# Patient Record
Sex: MALE | Race: WHITE | NOT HISPANIC OR LATINO | Employment: UNEMPLOYED | URBAN - METROPOLITAN AREA
[De-identification: names, ages, dates, MRNs, and addresses within clinical notes are randomized per-mention and may not be internally consistent; named-entity substitution may affect disease eponyms.]

---

## 2017-09-06 ENCOUNTER — NURSE TRIAGE (OUTPATIENT)
Dept: NURSING | Facility: CLINIC | Age: 32
End: 2017-09-06

## 2017-09-06 ENCOUNTER — HOSPITAL ENCOUNTER (EMERGENCY)
Facility: CLINIC | Age: 32
Discharge: HOME | End: 2017-09-06
Attending: EMERGENCY MEDICINE | Admitting: EMERGENCY MEDICINE
Payer: MEDICAID

## 2017-09-06 VITALS
WEIGHT: 174 LBS | SYSTOLIC BLOOD PRESSURE: 128 MMHG | HEIGHT: 68 IN | RESPIRATION RATE: 16 BRPM | DIASTOLIC BLOOD PRESSURE: 92 MMHG | TEMPERATURE: 98.7 F | OXYGEN SATURATION: 100 % | HEART RATE: 93 BPM | BODY MASS INDEX: 26.37 KG/M2
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
## 2017-09-06 DIAGNOSIS — S03.2XXA: ICD-10-CM

## 2017-09-06 DIAGNOSIS — K08.89 SUBLUXATION OF TOOTH: ICD-10-CM

## 2017-09-06 DIAGNOSIS — S02.5XXA CLOSED FRACTURE OF TOOTH, INITIAL ENCOUNTER: ICD-10-CM

## 2017-09-06 PROCEDURE — 64400 NJX AA&/STRD TRIGEMINAL NRV: CPT | Performed by: EMERGENCY MEDICINE

## 2017-09-06 PROCEDURE — 64400 NJX AA&/STRD TRIGEMINAL NRV: CPT | Mod: Z6 | Performed by: EMERGENCY MEDICINE

## 2017-09-06 PROCEDURE — 99283 EMERGENCY DEPT VISIT LOW MDM: CPT | Mod: 25 | Performed by: EMERGENCY MEDICINE

## 2017-09-06 PROCEDURE — 99284 EMERGENCY DEPT VISIT MOD MDM: CPT | Mod: 25 | Performed by: EMERGENCY MEDICINE

## 2017-09-06 RX ORDER — NAPROXEN 500 MG/1
500 TABLET ORAL 2 TIMES DAILY WITH MEALS
Qty: 16 TABLET | Refills: 0 | Status: SHIPPED | OUTPATIENT
Start: 2017-09-06 | End: 2017-09-14

## 2017-09-06 RX ORDER — BUPIVACAINE HYDROCHLORIDE 5 MG/ML
INJECTION, SOLUTION EPIDURAL; INTRACAUDAL
Status: DISCONTINUED
Start: 2017-09-06 | End: 2017-09-07 | Stop reason: HOSPADM

## 2017-09-06 RX ORDER — OXYCODONE HYDROCHLORIDE 5 MG/1
5 TABLET ORAL EVERY 6 HOURS PRN
Qty: 8 TABLET | Refills: 0 | Status: SHIPPED | OUTPATIENT
Start: 2017-09-06 | End: 2021-02-16

## 2017-09-06 ASSESSMENT — ENCOUNTER SYMPTOMS
BRUISES/BLEEDS EASILY: 0
SHORTNESS OF BREATH: 0
COLOR CHANGE: 0
NECK PAIN: 0
VOMITING: 0
FACIAL SWELLING: 0
FEVER: 0
HEADACHES: 0
NAUSEA: 0
TROUBLE SWALLOWING: 0
NECK STIFFNESS: 0

## 2017-09-06 NOTE — ED AVS SNAPSHOT
" [facility], Emergency Department    [address] [facility] [address] [address]    Phone:  [phone]                                       Markus Mosley   MRN: [id number]    Department:  [facility], Emergency Department   Date of Visit:  9/6/2017           Patient Information     Date Of Birth          1985        Your diagnoses for this visit were:     Closed fracture of tooth, initial encounter     Subluxation of tooth        You were seen by Derick Moses MD.        Discharge Instructions       Please make an appointment to follow up with [facility] (phone: [phone]) as soon as possible for further evaluation and recommendations.          *DENTAL PAIN    A crack or cavity in the tooth, which exposes the sensitive inner area of the tooth can cause tooth pain. An infection in the gum or the root of the tooth can cause pain and swelling. The pain is often made worse by drinking hot or cold fluids, or biting on hard foods. Pain may spread from the tooth to the ear or jaw on the same side.  HOME CARE:  1. Avoid hot and cold foods and liquids since your tooth may be sensitive to temperature changes.  2. If your tooth is chipped or cracked, or if there is a large open cavity, apply OIL OF CLOVES (available over-the-counter in drug stores) directly to the tooth to reduce pain. Some pharmacies carry an over-the-counter \"toothache kit.\" This contains a paste, which can be applied over the exposed tooth to decrease sensitivity. This is only a temporary solution. See a dentist as soon as possible to fix the tooth.  3. A cold pack on your jaw over the sore area may help reduce pain.  4. You may use acetaminophen (Tylenol) 650-1000 mg every 6 hours or ibuprofen (Motrin, Advil) 600 mg every 6-8 hours with food to control pain, if you are able to take these medicines. [ NOTE: If you have chronic liver or kidney disease or ever had a stomach ulcer or GI bleeding, talk with your doctor before " using these medicines.]  5. If you have signs of an infection, an antibiotic will be given. Take it as directed.  FOLLOW-UP as directed with a dentist. Your pain may go away with the treatment given. However, only a dentist can fully evaluate and treat the cause and prevent the pain from coming back again.  TOOTHACHE IS A SIGN OF DISEASE IN YOUR TOOTH AND SHOULD BE EXAMINED AND TREATED BY A DENTIST.  GET PROMPT MEDICAL ATTENTION if any of the following occur:    Your face becomes swollen or red    Pain worsens or spreads to the neck    Fever over 101  F (38.3  C)    Unusual drowsiness; headache or stiff neck; weakness or fainting    Pus drains from the tooth    Difficulty swallowing or breathing    [phone] [employer], [address] [place] [address], [place] [id number]. All rights reserved. This information is not intended as a substitute for professional medical care. Always follow your healthcare professional's instructions.        Many of these clinics offer a sliding fee option for patients that qualify, and see patients on a walk-in or same day basis. Please call each clinic directly. As services, hours, fees and policies vary greatly.    [place]:  [facility]     [phone]  [facility] ([facility]) [phone]  [facility]  [phone]  [facility]      [phone]   Community Clinic    [phone]  [facility]  [phone]  [facility] [phone]  [facility]     [phone]  [facility]   [phone]  [facility] (cash only)   [phone]  [facility]    [phone] (adults)          [phone] (children)    [place]:  [facility]     [phone]; [phone]  [facility]     [phone]  [facility]     [phone]  [facility] (free, limited)    [phone]    Multiple Locations:  [facility]       [phone]                    24 Hour Appointment Hotline       To make an appointment at any [facility], call [phone] ([phone]). If you don't have a family doctor or clinic, we will help you find one. [facility] are conveniently located to serve the needs of you and your family.             Review of your medicines      START taking        Dose / Directions Last dose taken    naproxen 500 MG tablet   Commonly known as:  NAPROSYN   Dose:  500 mg   Quantity:  16 tablet        Take 1 tablet (500 mg) by mouth 2 times daily (with meals) for 8 days   Refills:  0        oxyCODONE 5 MG IR tablet   Commonly known as:  ROXICODONE   Dose:  5 mg   Quantity:  8 tablet        Take 1 tablet (5 mg) by mouth every 6 hours as needed for pain   Refills:  0          Our records show that you are taking the medicines listed below. If these are incorrect, please call your family doctor or clinic.        Dose / Directions Last dose taken    multivitamin, therapeutic with minerals Tabs tablet   Dose:  1 tablet        Take 1 tablet by mouth daily   Refills:  0                Prescriptions were sent or printed at these locations (2 Prescriptions)                   Other Prescriptions                Printed at Department/Unit printer (2 of 2)         naproxen (NAPROSYN) 500 MG tablet               oxyCODONE (ROXICODONE) 5 MG IR tablet                Orders Needing Specimen Collection     None      Pending Results     No orders found from 9/4/2017 to 9/7/2017.            Pending Culture Results     No orders found from 9/4/2017 to 9/7/2017.            Pending Results Instructions     If you had any lab results that were not finalized at the time of your Discharge, you can call the ED Lab Result RN at [phone]. You will be contacted by this team for any positive Lab results or changes in  "treatment. The nurses are available 7 days a week from 10A to 6:30P.  You can leave a message 24 hours per day and they will return your call.        Thank you for choosing [facility]       Thank you for choosing [facility] for your care. Our goal is always to provide you with excellent care. Hearing back from our patients is one way we can continue to improve our services. Please take a few minutes to complete the written survey that you may receive in the mail after you visit with us. Thank you!        [employer] Information     [employer] lets you send messages to your doctor, view your test results, renew your prescriptions, schedule appointments and more. To sign up, go to [email or web address] . Click on \"Log in\" on the left side of the screen, which will take you to the Welcome page. Then click on \"Sign up Now\" on the right side of the page.     You will be asked to enter the access code listed below, as well as some personal information. Please follow the directions to create your username and password.     Your access code is: [email or web address]  Expires: 2017 11:15 PM     Your access code will  in 90 days. If you need help or a new code, please call your [facility] clinic or [phone].        Care EveryWhere ID     This is your Care EveryWhere ID. This could be used by other organizations to access your [facility] medical records  [id number]        Equal Access to Services     ABI CRUZ : Hadii dav Siddiqi, waaxda genevieve, qaybta kaalmemo alcantar, destiney muniz . So [facility] [phone].    ATENCIÓN: Si habla español, tiene a dodd disposición servicios gratuitos de asistencia lingüística. Michael al [phone].    We comply with applicable federal civil rights laws and [place] laws. We do not discriminate on the basis of race, color, national origin, age, disability sex, sexual orientation or gender identity.            After Visit Summary       This is your record. " Keep this with you and show to your community pharmacist(s) and doctor(s) at your next visit.

## 2017-09-06 NOTE — ED AVS SNAPSHOT
[facility] [place], Emergency Department    [address] [place] [address]    Phone:  [phone]                                       Markus Mosley   MRN: [id number]    Department:  [facility], Emergency Department   Date of Visit:  9/6/2017           After Visit Summary Signature Page     I have received my discharge instructions, and my questions have been answered. I have discussed any challenges I see with this plan with the nurse or doctor.    ..........................................................................................................................................  Patient/Patient Representative Signature      ..........................................................................................................................................  Patient Representative Print Name and Relationship to Patient    ..................................................               ................................................  Date                                            Time    ..........................................................................................................................................  Reviewed by Signature/Title    ...................................................              ..............................................  Date                                                            Time

## 2017-09-07 NOTE — DISCHARGE INSTRUCTIONS
"Please make an appointment to follow up with [facility] (phone: [phone]) as soon as possible for further evaluation and recommendations.          *DENTAL PAIN    A crack or cavity in the tooth, which exposes the sensitive inner area of the tooth can cause tooth pain. An infection in the gum or the root of the tooth can cause pain and swelling. The pain is often made worse by drinking hot or cold fluids, or biting on hard foods. Pain may spread from the tooth to the ear or jaw on the same side.  HOME CARE:  1. Avoid hot and cold foods and liquids since your tooth may be sensitive to temperature changes.  2. If your tooth is chipped or cracked, or if there is a large open cavity, apply OIL OF CLOVES (available over-the-counter in drug stores) directly to the tooth to reduce pain. Some pharmacies carry an over-the-counter \"toothache kit.\" This contains a paste, which can be applied over the exposed tooth to decrease sensitivity. This is only a temporary solution. See a dentist as soon as possible to fix the tooth.  3. A cold pack on your jaw over the sore area may help reduce pain.  4. You may use acetaminophen (Tylenol) 650-1000 mg every 6 hours or ibuprofen (Motrin, Advil) 600 mg every 6-8 hours with food to control pain, if you are able to take these medicines. [ NOTE: If you have chronic liver or kidney disease or ever had a stomach ulcer or GI bleeding, talk with your doctor before using these medicines.]  5. If you have signs of an infection, an antibiotic will be given. Take it as directed.  FOLLOW-UP as directed with a dentist. Your pain may go away with the treatment given. However, only a dentist can fully evaluate and treat the cause and prevent the pain from coming back again.  TOOTHACHE IS A SIGN OF DISEASE IN YOUR TOOTH AND SHOULD BE EXAMINED AND TREATED BY A DENTIST.  GET PROMPT MEDICAL ATTENTION if any of the following occur:    Your face becomes swollen or red    Pain worsens or " spreads to the neck    Fever over 101  F (38.3  C)    Unusual drowsiness; headache or stiff neck; weakness or fainting    Pus drains from the tooth    Difficulty swallowing or breathing    [phone] [employer], [address] [place] [address], [place] [address]. All rights reserved. This information is not intended as a substitute for professional medical care. Always follow your healthcare professional's instructions.        Many of these clinics offer a sliding fee option for patients that qualify, and see patients on a walk-in or same day basis. Please call each clinic directly. As services, hours, fees and policies vary greatly.    [place]:  [facility]     [phone]  [facility] [phone]  [facility]  [phone]  [facility]      [phone]   Community Clinic    [phone]  [facility]  [phone]  [facility] [phone]  [facility]     [phone]  [facility]   [phone]  [facility] (cash only)   [phone]  [facility]    [phone] (adults)          [phone] (children)    [place]:  [facility]     [phone]; [phone]  [facility]     [phone]  [facility]     [phone]  [facility] (free, limited)    [phone]    Multiple Locations:  [facility]       [phone]

## 2017-09-07 NOTE — TELEPHONE ENCOUNTER
Reason for Disposition    [1] Tooth pushed out of its normal position AND [2] interferes with normal bite or chewing     Markus reports cracking tooth about 5 days ago and he is having uncontrolled pain.  Partner reports that when he looks at it, it looks infected or that there may be pus.    Additional Information    Negative: Knocked out (unconscious) > 1 minute    Negative: Difficult to awaken or acting confused  (e.g., disoriented, slurred speech)    Negative: Severe neck pain    Negative: Sounds like a life-threatening emergency to the triager    Negative: Wound looks infected    Negative: Tooth knocked out    Negative: Tooth is almost falling out    Negative: [1] Bleeding AND [2] won't stop after 10 minutes of direct pressure (using correct technique)    Negative: [1] Tooth pushed out if its normal position AND [2] looks severe    Protocols used: TOOTH INJURY-ADULT-      Octavia Gong RN  [facility]

## 2017-09-07 NOTE — ED PROVIDER NOTES
[facility] EMERGENCY DEPARTMENT ([facility])  9/06/17   History     Chief Complaint   Patient presents with     Dental Problem     HPI  Markus Mosley is a 31 year old male who presents to the Emergency Department for evaluation of a chipped tooth on the upper left side. Patient reports he was eating at a restaurant on 9/1/2017 when this occurred. He has not seen a dentist about the tooth. Patient has been experiencing pain in this tooth since. Pain is constant, throbbing, moderate, non-radiating. Patient has been taking ibuprofen, Tylenol and Orajel with no improvement of the pain. No fevers.    I have reviewed the Medications, Allergies, Past Medical and Surgical History, and Social History in the [employer] system.    Past Medical History:   Diagnosis Date     History of chickenpox        History reviewed. No pertinent surgical history.    Family History   Problem Relation Age of Onset     DIABETES No family hx of      Coronary Artery Disease Early Onset Father 60     CANCER No family hx of        Social History   Substance Use Topics     Smoking status: Former Smoker     Quit date: 9/8/2014     Smokeless tobacco: Current User      Comment: uses E-Cig     Alcohol use No      Comment: currently in treatment        No current facility-administered medications for this encounter.      Current Outpatient Prescriptions   Medication     naproxen (NAPROSYN) 500 MG tablet     oxyCODONE (ROXICODONE) 5 MG IR tablet     multivitamin, therapeutic with minerals (THERA-VIT-M) TABS        Allergies   Allergen Reactions     Penicillins Swelling         Review of Systems   Constitutional: Negative for fever.   HENT: Positive for dental problem. Negative for facial swelling and trouble swallowing.    Respiratory: Negative for shortness of breath.    Cardiovascular: Negative for chest pain.   Gastrointestinal: Negative for nausea and vomiting.   Musculoskeletal: Negative for neck pain and neck stiffness.   Skin: Negative for  "color change.   Allergic/Immunologic: Negative for immunocompromised state.   Neurological: Negative for headaches.   Hematological: Does not bruise/bleed easily.   Psychiatric/Behavioral: Negative for behavioral problems.   All other systems reviewed and are negative.      Physical Exam   BP: 137/87  Pulse: 110  Temp: 98.7  F (37.1  C)  Resp: 16  Height: 172.7 cm (5' 8\")  Weight: 78.9 kg (174 lb)  SpO2: 99 %  Physical Exam   Constitutional: He is oriented to person, place, and time. He appears well-developed and well-nourished. He appears distressed ( mild distress from pain).   HENT:   Head: Normocephalic.   Right Ear: External ear normal.   Left Ear: External ear normal.   Nose: Nose normal.   Mouth/Throat: No oropharyngeal exudate.   Tooth #12 is fractured and loose. No pulp exposure. No bleeding. No dental abscess. No airway obstruction.   Eyes: Conjunctivae and EOM are normal. Pupils are equal, round, and reactive to light.   Neck: Normal range of motion. Neck supple.   Cardiovascular:   tachycardia   Pulmonary/Chest: Effort normal. No respiratory distress.   Musculoskeletal: Normal range of motion.   Lymphadenopathy:     He has no cervical adenopathy.   Neurological: He is alert and oriented to person, place, and time. No cranial nerve deficit. He exhibits normal muscle tone. Coordination normal.   Skin: Skin is warm. He is not diaphoretic. No erythema.   Psychiatric: He has a normal mood and affect. His behavior is normal. Judgment and thought content normal.   Nursing note and vitals reviewed.      ED Course   10:27 PM  The patient was seen and examined by Mika Moses MD in Room ED04.     ED Course     Procedures     DENTAL BLOCK: 0.5% bupivacaine injection performed intraorally, total of 8cc's. Patient achieved full anesthesia subsequently.              Critical Care time:  none             Labs Ordered and Resulted from Time of ED Arrival Up to the Time of Departure from the ED - No data to display     "     Assessments & Plan (with Medical Decision Making)   31-year-old man presenting with toothache. Differential diagnosis: tooth fracture, avulsion, unlikely dental abscess.    After thorough history and physical exam, patient appears to be in no acute distress. We ll perform a dental block for the patient and have him referred to the dental clinic for follow-up tomorrow morning. He and his partner agree with the plan. He will return if his symptoms worsen.     This part of the medical record was transcribed by Armand Byers, Medical Scribe, from a dictation done by Mika Moses MD.       I have reviewed the nursing notes.    I have reviewed the findings, diagnosis, plan and need for follow up with the patient.    Discharge Medication List as of 9/6/2017 11:15 PM      START taking these medications    Details   naproxen (NAPROSYN) 500 MG tablet Take 1 tablet (500 mg) by mouth 2 times daily (with meals) for 8 days, Disp-16 tablet, R-0, Local Print      oxyCODONE (ROXICODONE) 5 MG IR tablet Take 1 tablet (5 mg) by mouth every 6 hours as needed for pain, Disp-8 tablet, R-0, Local Print             Final diagnoses:   Closed fracture of tooth, initial encounter   Subluxation of tooth     I, rAmand Byers, am serving as a trained medical scribe to document services personally performed by Mika Moses MD, based on the provider's statements to me.   IMika MD, was physically present and have reviewed and verified the accuracy of this note documented by Armand Byers.    9/6/2017   [facility] [place], EMERGENCY DEPARTMENT     Derick Moses MD  09/07/17 0126

## 2018-09-26 ENCOUNTER — OFFICE VISIT (OUTPATIENT)
Dept: PSYCHIATRY | Facility: CLINIC | Age: 33
End: 2018-09-26
Payer: COMMERCIAL

## 2018-09-26 DIAGNOSIS — F29 PSYCHOSIS (H): Primary | ICD-10-CM

## 2018-09-26 NOTE — MR AVS SNAPSHOT
After Visit Summary   9/26/2018    Markus Mosley    MRN: [id number]           Patient Information     Date Of Birth          1985        Visit Information        Provider Department      9/26/2018 9:00 AM Debbie Wellington LGSW [facility]        Today's Diagnoses     Psychosis (H)    -  1       Follow-ups after your visit        Who to contact     Please call your clinic at [phone] to:    Ask questions about your health    Make or cancel appointments    Discuss your medicines    Learn about your test results    Speak to your doctor            Additional Information About Your Visit        Care EveryWhere ID     This is your Care EveryWhere ID. This could be used by other organizations to access your [facility] medical records  [id number]         Blood Pressure from Last 3 Encounters:   09/06/17 (!) 128/92   02/12/16 119/81   01/08/16 135/87    Weight from Last 3 Encounters:   09/06/17 78.9 kg (174 lb)   02/12/16 83.9 kg (185 lb)   01/08/16 85.2 kg (187 lb 12.8 oz)              Today, you had the following     No orders found for display       Primary Care Provider Fax #    Physician No Ref-Primary [phone]       No address on file        Equal Access to Services     [facility] : Hadii dav Siddiqi, wadejahda genevieve, qaybta calalmada katharine, destiney muniz . So [facility] [phone].    ATENCIÓN: Si habla español, tiene a dodd disposición servicios gratuitos de asistencia lingüística. Llame al [phone].    We comply with applicable federal civil rights laws and [place] laws. We do not discriminate on the basis of race, color, national origin, age, disability, sex, sexual orientation, or gender identity.            Thank you!     Thank you for choosing [facility]  for your care. Our goal is always to provide you with excellent care. Hearing back from our patients is one way we can continue to improve our services. Please take a few minutes to  complete the written survey that you may receive in the mail after your visit with us. Thank you!             Your Updated Medication List - Protect others around you: Learn how to safely use, store and throw away your medicines at www.disposemymeds.org.          This list is accurate as of 9/26/18 11:59 PM.  Always use your most recent med list.                   Brand Name Dispense Instructions for use Diagnosis    multivitamin, therapeutic with minerals Tabs tablet      Take 1 tablet by mouth daily        oxyCODONE IR 5 MG tablet    ROXICODONE    8 tablet    Take 1 tablet (5 mg) by mouth every 6 hours as needed for pain

## 2018-10-16 NOTE — PROGRESS NOTES
"[facility] NAVIGATE Clinical Assessment of Need  A Part of the [facility] First Episode of Psychosis Program    Patient: Markus Mosley (1985, 32 year old)     MRN: [id number]  Date:  9/26/18  Clinician: JOAQUIN Caballero     Length of Actual Contact: Start Time: 9:00; End Time: 10:05  Location of Contact:  [facility], [facility]  People present:  Writer, Client, Others: Client's mother    Reviewed limits to confidentiality, Yes     Chief Complaint    \"I have a [facility]  and was referred for psychosis intervention\"    History of Presenting Illness    Markus stated he hears demonic voices and feels he needs to protect others. He experiences paranoia that electronics are/will hurt him and has broken various electronics in the past. Markus noted feeling as though there's a burglar in the house and has established various safeguards to protect him and his mother. Markus's mother said around 5 years ago, Markus was living in the woods alone because he felt he was being followed. Markus stated he has had ongoing symptoms while being sober.     Hoped for outcomes  Markus would like to have a \"stable lifestyle,\" be able to obtain a job, and diminish psychosis symptoms.     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 No  -History of a diagnosis of borderline personality disorder? No    Psychiatric hospitalization(s), location, admit date, and length of " stay  Markus reported an extensive history of psychiatric hospitalizations, about 12 in total  [facility] was the most recent, in 2018. He was hospitalized for 3 days.  He noted he was in [facility] 7-8 years ago and was on commitment following an inpatient stay of 2 weeks.     Medications, length of use, benefits, and unpleasant effects  Markus mentioned a history of the following medications, with an unspecific timeframe: Zoloft, Citalopram, Seroquel, Rexulti, Haldol, and Abilify. He estimated taking Seroquel for at least 6 months cumulatively. He took Rexulti for 1.5 months after hospitalization. He reported taking Haldol and Abilify for about one week. He has noticed an increased appetite, dry mouth, and fatigue when taking these.     -History of antipsychotic use (cumulative months)? Yes     Outpatient Programs & Services  Markus reports having a mental health  and [employer] worker from [facility].     Developmental & Medical History (Brief)    Past/Present developmental and/or medical issues, date diagnosed, and impact  He reported none.     -History of significant head injury or loss of consciousness? If yes, history of brain imaging? No  -History of a developmental delay or use of an IEP or 504? No    Psychosocial Supports:    [place] Assessment of Need Short Appraisal Schedule was completed (see below)    Primary supports  Parents, sisters, Christopher (significant other)    Strengths and coping strategies   Markus jong with the hallucinations by ignoring them, listening to music, or reminding himself they are not real.     Screening/Assessment Measures:    Modified [place] Symptom Index will be faxed to the clinic following the appointment.     [place] Assessment of Need Short Appraisal Schedule was completed today, 18, with Markus.    Scorin-No problem    1-Met need    2-Unmet need    9-Not known    1. Accommodation (what kind of place do you live in?) 0  2. Food (do  you get enough to eat?) 0  3. Looking after the home (are you able to look after your home?) 0  4. Self-care (do you have problems keeping clean and tidy?) 2  5. Daytime activities (how do you spend your day?) 2  6. Physical health (how well do you feel physically?) Rosa Aparicio feels he needs to improve nutrition and lose weight  7. Psychotic symptoms (do you ever hear voices or have problems with your thoughts?) Rosa Aparicio rated his current distress level at a 8/10  8. Information on condition and treatment (have you been given clear information about your medication?) 0  9. Psychological distress (have you recently felt sad or low?) 2  10. Safety to self (do you ever have thoughts of harming yourself?) Sandy Aparicio noted he has had suicidal thoughts in the past, but denies currently  11. Safety to others (do you think you could be a danger to other people's safety?) Sandy Aparicio noted he has had homicidal thoughts in the past, but denies currently  12. Alcohol (does drinking cause you problems?) 0  13. Drugs (do you take drugs that aren't prescribed?) Rosa Aparicio is currently using a vaporizer and finds THC to be helpful for anxiety  14. Company (are you happy with your social life?) 1  15. Intimate relationships (do you have a partner?) Rosa Aparicio feels their relationship is codependent.   16. Sexual expression (how is your sex life?) Rosa Aparicio is concerned with medication side effects in this regard  17.  (do you have any children under 18?) 0  18. Basic education (any difficulty in reading, writing, or understanding English?) 2   19. Telephone (do you know how to use a telephone?) Rosa Aparicio experiences confusion when using his cellphone  20. Transport (how do you find using the bus, tube, or train?) 0  21. Money (how do you find budgeting your money?) 0  22. Benefits (are you getting all the money you are entitled to?) 0    Mental Status Exams:  Alertness: alert  and oriented  Appearance: casually  groomed  Behavior/Demeanor: cooperative and pleasant, with good  eye contact   Speech: normal and regular rate and rhythm  Language: intact. Preferred language identified as English.  Psychomotor: normal or unremarkable  Mood: depressed  Affect: indifferent; was congruent to mood; was congruent to content  Thought Process/Associations: perseverative  Thought Content:  Reports delusions and paranoid ideation;  Denies suicidal and violent ideation  Perception:  Reports auditory hallucinations;  Denies visual hallucinations  Insight: adequate  Judgment: adequate for safety  Cognition: does  appear grossly intact; formal cognitive testing was not done    Techniques Utilized:   CBT Teaching Strategies:  Reinforcement and shaping (positive feedback for steps towards goals and gains in knowledge & skills)    Motivational Teaching Strategies:  Connect info and skills with personal goals  Promote hope and positive expectations    Educational Teaching Strategies:  Relate information to client's experience  Ask questions to check comprehension  Break down information into small chunks  Adopt client's language     Psychiatric Diagnosis(es):    According to Markus, he was diagnosed with Schizoaffective Disorder during hospitalization in July 2018.     Assessment/Progress Note:     Markus Mosley is a 32 year old single (never )  male who presented for psychosis assessment of need visit to determine potential eligibility for participation in [facility] First Episode of Psychosis services. Markus was referred by his . Markus presented today as a Fair historian with [employer] insight. He has a lifetime history of 12 hospitalizations and carries psychiatric diagnoses of Schizoaffective Disorder and Unspecified Anxiety Disorder. Further diagnostic clarification is needed. A psychiatric diagnostic assessment will be scheduled with RENAE Martin.      Markus identified present symptoms to include  "auditory hallucinations, delusional thoughts, disorganized behavior, low motivation/energy. Psychosocial stressors were identified as mental health symptoms, occupational / vocational stress and relationship stress. Explored Markus's goal(s) to include reducing symptoms, obtaining a job.     Markus is currently participating in case management and [facility] services. He may benefit from services such as therapy, medication management, family therapy, and supported employment. Markus's willingness to pursue said services seems moderate.     Identified risk factors and/or vulnerabilities include male and hopelessness, worthlessness. Protective factors and/or strengths identified as goal-focused, open to suggestions / feedback, responsible parent, support of family, friends and providers and wants to learn. Suicidal ideation was not present at the time of today's visit.    Markus agrees to treatment with the capacity to do so. Agrees to call clinic for any problems. The patient understands to call 911 or come to the nearest ED if life threatening or urgent symptoms present.    Billing for \"Interactive Complexity\"?    No    Plan/Referrals:     This writer will have scheduling call Markus to secure a Diagnostic Assessment.     Debbie Wellington CHRISTI     Attestation:    I did not see this patient directly. This patient is discussed with me in individual clinical social work supervision, and I agree with the plan as documented.     RENAE Goldberg, [facility], October 16, 2018      "

## 2018-10-25 ENCOUNTER — TELEPHONE (OUTPATIENT)
Dept: PSYCHIATRY | Facility: CLINIC | Age: 33
End: 2018-10-25

## 2018-10-25 NOTE — TELEPHONE ENCOUNTER
NAVIGATE Outreach  A Part of the [facility] First Episode of Psychosis Program     Patient Name: Markus Mosley  /Age:  1985 (32 year old)  Call duration: 5 minutes    This writer received a message from Markus's mother stating he would likely respond better with direct phone calls from our team. This writer then called Markus to set up a Diagnostic Assessment appointment. He noted he would be interested, but has limited transportation options on Wednesday afternoons. He then said he will likely be going to the hospital shortly (taken by his mother) and will need to reschedule after that. This writer provided the [facility] Scheduling phone number and asked him to reach out for for further appointments; he agreed.      RENAE Caballero, LGSW  NAVIGATE Individual Resiliency Trainer

## 2020-04-25 ENCOUNTER — NURSE TRIAGE (OUTPATIENT)
Dept: NURSING | Facility: CLINIC | Age: 35
End: 2020-04-25

## 2020-04-25 ENCOUNTER — HOSPITAL ENCOUNTER (EMERGENCY)
Facility: CLINIC | Age: 35
Discharge: HOME | End: 2020-04-26

## 2020-04-26 NOTE — TELEPHONE ENCOUNTER
Markus calls and says that he has schizoaffective disorder and has been having intrusive thoughts and hearing voices. Pt. Says that the voices tell him to harm himself. Pt. Says that his fiance is currently with him. Pt. Refuses to call 911 but will have his fiance take him to the [facility] ER now. RN then called that ER and spoke to a charge nurse. RN told that nurse about pt's symptoms and about his impending arrival. Charge nurse voice understanding. COVID 19 Nurse Triage Plan/Patient Instructions    Please be aware that novel coronavirus (COVID-19) may be circulating in the community. If you develop symptoms such as fever, cough, or SOB or if you have concerns about the presence of another infection including coronavirus (COVID-19), please contact your health care provider or visit [email or web address].     Disposition/Instructions    Patient to go to ED and follow protocol based instructions. Follow System Ambulatory Workflow for COVID 19.     Bring Your Own Device:  Please also bring your smart device(s) (smart phones, tablets, laptops) and their charging cables for your personal use and to communicate with your care team during your visit.    Thank you for limiting contact with others, wearing a simple mask to cover your cough, practice good hand hygiene habits and accessing our virtual services where possible to limit the spread of this virus.    For more information about COVID19 and options for caring for yourself at home, please visit the CDC website at https://www.cdc.gov/coronavirus/2019-ncov/about/steps-when-sick.html  For more options for care at [facility], please visit our website at [email or web address]    For more information, please use the [place] Department of Health COVID-19 Website: https://www.health.state.[place]/diseases/coronavirus/index.html  [facility] ([facility]) COVID-19 Hotlines (Interpreters available):      Health questions:  Phone Number: [phone] or 1-881.913.4658 and Hours: 7 a.m. to 7 p.m.    Schools and  questions: Phone Number: [phone] or [phone] and Hours 7 a.m. to 7 p.m.                  Reason for Disposition    Patient attempted suicide    Protocols used: SUICIDE [id number]

## 2021-02-16 ENCOUNTER — TELEPHONE (OUTPATIENT)
Dept: BEHAVIORAL HEALTH | Facility: CLINIC | Age: 36
End: 2021-02-16

## 2021-02-16 ENCOUNTER — HOSPITAL ENCOUNTER (INPATIENT)
Facility: CLINIC | Age: 36
LOS: 6 days | Discharge: PSYCHIATRIC OR SUBSTANCE USE FACILITY | End: 2021-02-22
Attending: EMERGENCY MEDICINE | Admitting: PSYCHIATRY & NEUROLOGY
Payer: COMMERCIAL

## 2021-02-16 DIAGNOSIS — Z20.822 COVID-19 RULED OUT BY LABORATORY TESTING: ICD-10-CM

## 2021-02-16 DIAGNOSIS — F13.20 BENZODIAZEPINE DEPENDENCE (H): ICD-10-CM

## 2021-02-16 LAB
ALBUMIN SERPL-MCNC: 4 G/DL (ref 3.4–5)
ALCOHOL BREATH TEST: 0 (ref 0–0.01)
ALP SERPL-CCNC: 53 U/L (ref 40–150)
ALT SERPL W P-5'-P-CCNC: 35 U/L (ref 0–70)
AMPHETAMINES UR QL SCN: NEGATIVE
ANION GAP SERPL CALCULATED.3IONS-SCNC: 6 MMOL/L (ref 3–14)
AST SERPL W P-5'-P-CCNC: 18 U/L (ref 0–45)
BARBITURATES UR QL: NEGATIVE
BASOPHILS # BLD AUTO: 0.1 10E9/L (ref 0–0.2)
BASOPHILS NFR BLD AUTO: 1.1 %
BENZODIAZ UR QL: POSITIVE
BILIRUB SERPL-MCNC: 0.3 MG/DL (ref 0.2–1.3)
BUN SERPL-MCNC: 12 MG/DL (ref 7–30)
CALCIUM SERPL-MCNC: 9.3 MG/DL (ref 8.5–10.1)
CANNABINOIDS UR QL SCN: POSITIVE
CHLORIDE SERPL-SCNC: 107 MMOL/L (ref 94–109)
CO2 SERPL-SCNC: 28 MMOL/L (ref 20–32)
COCAINE UR QL: NEGATIVE
CREAT SERPL-MCNC: 0.85 MG/DL (ref 0.66–1.25)
DIFFERENTIAL METHOD BLD: NORMAL
EOSINOPHIL # BLD AUTO: 0.3 10E9/L (ref 0–0.7)
EOSINOPHIL NFR BLD AUTO: 3.1 %
ERYTHROCYTE [DISTWIDTH] IN BLOOD BY AUTOMATED COUNT: 12.6 % (ref 10–15)
ETHANOL UR QL SCN: NEGATIVE
GFR SERPL CREATININE-BSD FRML MDRD: >90 ML/MIN/{1.73_M2}
GLUCOSE SERPL-MCNC: 97 MG/DL (ref 70–99)
HCT VFR BLD AUTO: 42.6 % (ref 40–53)
HGB BLD-MCNC: 14.2 G/DL (ref 13.3–17.7)
IMM GRANULOCYTES # BLD: 0.1 10E9/L (ref 0–0.4)
IMM GRANULOCYTES NFR BLD: 0.6 %
LABORATORY COMMENT REPORT: NORMAL
LYMPHOCYTES # BLD AUTO: 2 10E9/L (ref 0.8–5.3)
LYMPHOCYTES NFR BLD AUTO: 25.2 %
MCH RBC QN AUTO: 29.8 PG (ref 26.5–33)
MCHC RBC AUTO-ENTMCNC: 33.3 G/DL (ref 31.5–36.5)
MCV RBC AUTO: 90 FL (ref 78–100)
MONOCYTES # BLD AUTO: 0.5 10E9/L (ref 0–1.3)
MONOCYTES NFR BLD AUTO: 6.3 %
NEUTROPHILS # BLD AUTO: 5.2 10E9/L (ref 1.6–8.3)
NEUTROPHILS NFR BLD AUTO: 63.7 %
NRBC # BLD AUTO: 0 10*3/UL
NRBC BLD AUTO-RTO: 0 /100
OPIATES UR QL SCN: NEGATIVE
PLATELET # BLD AUTO: 268 10E9/L (ref 150–450)
POTASSIUM SERPL-SCNC: 4 MMOL/L (ref 3.4–5.3)
PROT SERPL-MCNC: 7.4 G/DL (ref 6.8–8.8)
RBC # BLD AUTO: 4.76 10E12/L (ref 4.4–5.9)
SARS-COV-2 RNA RESP QL NAA+PROBE: NEGATIVE
SODIUM SERPL-SCNC: 141 MMOL/L (ref 133–144)
SPECIMEN SOURCE: NORMAL
WBC # BLD AUTO: 8.1 10E9/L (ref 4–11)

## 2021-02-16 PROCEDURE — C9803 HOPD COVID-19 SPEC COLLECT: HCPCS | Performed by: EMERGENCY MEDICINE

## 2021-02-16 PROCEDURE — 99284 EMERGENCY DEPT VISIT MOD MDM: CPT | Performed by: EMERGENCY MEDICINE

## 2021-02-16 PROCEDURE — 82075 ASSAY OF BREATH ETHANOL: CPT | Performed by: EMERGENCY MEDICINE

## 2021-02-16 PROCEDURE — 85025 COMPLETE CBC W/AUTO DIFF WBC: CPT | Performed by: EMERGENCY MEDICINE

## 2021-02-16 PROCEDURE — 250N000013 HC RX MED GY IP 250 OP 250 PS 637: Performed by: EMERGENCY MEDICINE

## 2021-02-16 PROCEDURE — 128N000004 HC R&B CD ADULT

## 2021-02-16 PROCEDURE — U0003 INFECTIOUS AGENT DETECTION BY NUCLEIC ACID (DNA OR RNA); SEVERE ACUTE RESPIRATORY SYNDROME CORONAVIRUS 2 (SARS-COV-2) (CORONAVIRUS DISEASE [COVID-19]), AMPLIFIED PROBE TECHNIQUE, MAKING USE OF HIGH THROUGHPUT TECHNOLOGIES AS DESCRIBED BY CMS-2020-01-R: HCPCS | Performed by: EMERGENCY MEDICINE

## 2021-02-16 PROCEDURE — 99285 EMERGENCY DEPT VISIT HI MDM: CPT | Mod: 25 | Performed by: EMERGENCY MEDICINE

## 2021-02-16 PROCEDURE — 80053 COMPREHEN METABOLIC PANEL: CPT | Performed by: EMERGENCY MEDICINE

## 2021-02-16 PROCEDURE — 80320 DRUG SCREEN QUANTALCOHOLS: CPT | Performed by: EMERGENCY MEDICINE

## 2021-02-16 PROCEDURE — 80307 DRUG TEST PRSMV CHEM ANLYZR: CPT | Performed by: EMERGENCY MEDICINE

## 2021-02-16 PROCEDURE — 250N000013 HC RX MED GY IP 250 OP 250 PS 637: Performed by: PSYCHIATRY & NEUROLOGY

## 2021-02-16 PROCEDURE — U0005 INFEC AGEN DETEC AMPLI PROBE: HCPCS | Performed by: EMERGENCY MEDICINE

## 2021-02-16 RX ORDER — ONDANSETRON 4 MG/1
4 TABLET, ORALLY DISINTEGRATING ORAL EVERY 6 HOURS PRN
Status: DISCONTINUED | OUTPATIENT
Start: 2021-02-16 | End: 2021-02-22 | Stop reason: HOSPADM

## 2021-02-16 RX ORDER — ACETAMINOPHEN 325 MG/1
650 TABLET ORAL EVERY 4 HOURS PRN
Status: DISCONTINUED | OUTPATIENT
Start: 2021-02-16 | End: 2021-02-22 | Stop reason: HOSPADM

## 2021-02-16 RX ORDER — OLANZAPINE 10 MG/2ML
10 INJECTION, POWDER, FOR SOLUTION INTRAMUSCULAR 3 TIMES DAILY PRN
Status: DISCONTINUED | OUTPATIENT
Start: 2021-02-16 | End: 2021-02-22 | Stop reason: HOSPADM

## 2021-02-16 RX ORDER — PROPRANOLOL HYDROCHLORIDE 10 MG/1
10 TABLET ORAL 2 TIMES DAILY PRN
Status: DISCONTINUED | OUTPATIENT
Start: 2021-02-16 | End: 2021-02-22 | Stop reason: HOSPADM

## 2021-02-16 RX ORDER — QUETIAPINE FUMARATE 25 MG/1
25 TABLET, FILM COATED ORAL DAILY PRN
Status: DISCONTINUED | OUTPATIENT
Start: 2021-02-16 | End: 2021-02-20

## 2021-02-16 RX ORDER — DOXYCYCLINE HYCLATE 50 MG/1
40 CAPSULE ORAL 2 TIMES DAILY
COMMUNITY
End: 2021-02-16

## 2021-02-16 RX ORDER — VILAZODONE HYDROCHLORIDE 40 MG/1
40 TABLET ORAL DAILY
Status: DISCONTINUED | OUTPATIENT
Start: 2021-02-16 | End: 2021-02-22 | Stop reason: HOSPADM

## 2021-02-16 RX ORDER — MAGNESIUM HYDROXIDE/ALUMINUM HYDROXICE/SIMETHICONE 120; 1200; 1200 MG/30ML; MG/30ML; MG/30ML
30 SUSPENSION ORAL EVERY 4 HOURS PRN
Status: DISCONTINUED | OUTPATIENT
Start: 2021-02-16 | End: 2021-02-22 | Stop reason: HOSPADM

## 2021-02-16 RX ORDER — ARIPIPRAZOLE 20 MG/1
20 TABLET ORAL AT BEDTIME
Status: ON HOLD | COMMUNITY
End: 2021-02-19

## 2021-02-16 RX ORDER — OLANZAPINE 10 MG/1
10 TABLET ORAL 3 TIMES DAILY PRN
Status: DISCONTINUED | OUTPATIENT
Start: 2021-02-16 | End: 2021-02-22 | Stop reason: HOSPADM

## 2021-02-16 RX ORDER — PHENOBARBITAL 64.8 MG/1
64.8 TABLET ORAL 4 TIMES DAILY
Status: COMPLETED | OUTPATIENT
Start: 2021-02-16 | End: 2021-02-18

## 2021-02-16 RX ORDER — TRAZODONE HYDROCHLORIDE 50 MG/1
50 TABLET, FILM COATED ORAL AT BEDTIME
Status: ON HOLD | COMMUNITY
End: 2021-02-19

## 2021-02-16 RX ORDER — ARIPIPRAZOLE 10 MG/1
20 TABLET ORAL AT BEDTIME
Status: DISCONTINUED | OUTPATIENT
Start: 2021-02-16 | End: 2021-02-22 | Stop reason: HOSPADM

## 2021-02-16 RX ORDER — VILAZODONE HYDROCHLORIDE 40 MG/1
40 TABLET ORAL DAILY
Status: ON HOLD | COMMUNITY
End: 2021-02-19

## 2021-02-16 RX ORDER — TRAZODONE HYDROCHLORIDE 50 MG/1
50 TABLET, FILM COATED ORAL
Status: DISCONTINUED | OUTPATIENT
Start: 2021-02-16 | End: 2021-02-22 | Stop reason: HOSPADM

## 2021-02-16 RX ORDER — AMOXICILLIN 250 MG
1 CAPSULE ORAL 2 TIMES DAILY PRN
Status: DISCONTINUED | OUTPATIENT
Start: 2021-02-16 | End: 2021-02-22 | Stop reason: HOSPADM

## 2021-02-16 RX ORDER — PROPRANOLOL HYDROCHLORIDE 10 MG/1
10 TABLET ORAL 2 TIMES DAILY PRN
Status: ON HOLD | COMMUNITY
End: 2021-02-19

## 2021-02-16 RX ORDER — QUETIAPINE FUMARATE 25 MG/1
25 TABLET, FILM COATED ORAL DAILY PRN
Status: ON HOLD | COMMUNITY
End: 2021-02-19

## 2021-02-16 RX ORDER — CLONAZEPAM 1 MG/1
1 TABLET ORAL 2 TIMES DAILY
Status: ON HOLD | COMMUNITY
End: 2021-02-19

## 2021-02-16 RX ORDER — HYDROXYZINE HYDROCHLORIDE 25 MG/1
25 TABLET, FILM COATED ORAL EVERY 4 HOURS PRN
Status: DISCONTINUED | OUTPATIENT
Start: 2021-02-16 | End: 2021-02-22 | Stop reason: HOSPADM

## 2021-02-16 RX ADMIN — ARIPIPRAZOLE 20 MG: 10 TABLET ORAL at 20:40

## 2021-02-16 RX ADMIN — PHENOBARBITAL 64.8 MG: 64.8 TABLET ORAL at 20:40

## 2021-02-16 RX ADMIN — NICOTINE POLACRILEX 4 MG: 4 GUM, CHEWING BUCCAL at 14:07

## 2021-02-16 RX ADMIN — NICOTINE POLACRILEX 4 MG: 4 GUM, CHEWING BUCCAL at 18:40

## 2021-02-16 RX ADMIN — PHENOBARBITAL 64.8 MG: 64.8 TABLET ORAL at 17:02

## 2021-02-16 RX ADMIN — NICOTINE POLACRILEX 4 MG: 4 GUM, CHEWING BUCCAL at 20:40

## 2021-02-16 RX ADMIN — NICOTINE POLACRILEX 4 MG: 4 GUM, CHEWING BUCCAL at 17:02

## 2021-02-16 ASSESSMENT — ENCOUNTER SYMPTOMS
SHORTNESS OF BREATH: 0
ABDOMINAL PAIN: 0
FEVER: 0

## 2021-02-16 ASSESSMENT — ACTIVITIES OF DAILY LIVING (ADL)
LAUNDRY: WITH SUPERVISION
DRESS: INDEPENDENT
HYGIENE/GROOMING: INDEPENDENT
ORAL_HYGIENE: INDEPENDENT

## 2021-02-16 ASSESSMENT — MIFFLIN-ST. JEOR: SCORE: 2102

## 2021-02-16 NOTE — TELEPHONE ENCOUNTER
S: 12:40PM- ED MD called to request inpt detox for 35 yrs old male in the [place] ED.    B: Pt presents to the ED seeking detox from Benzodiazepines.   Pt reports that he went to [facility] this morning and they sent him to the ER.  Pt is using around 4mg Xanax daily in the last 6 months. PHONG was this morning at 8AM. He does not use Klonopin on a regular basis but he had some leftover and used it this morning at 8AM.  Pt uses marijuana daily.  Pt is not in withdrawal right now; usually takes about 8 hours. Pt has not had any alcohol in weeks. Pt states he could return to [place] after detox.    Pt has no COVID symptoms.  COVID will be ordered.      Pt denies any chronic medical illness.  He ambulates independently.  Labs are pending.   CBC: WNL  CMP: WNL  UTOX: Positive for Benzo and Cannabinoids.    COVID: pending  Breathalyzer: 0  Vitals: stable.    A: Vol.    R: 2:17PM- Dr. Hung accepts for herself on 3A.      Unit charge will call Intake back.      2:57PM- Unit will contact ED when for report and when they can accept Pt.      ED RN notified via phone.    ED was notified via text page.      Patient cleared and ready for behavioral bed placement: Yes

## 2021-02-16 NOTE — PROGRESS NOTES
10 tablet of 1 mg of klonopin wasted and observed by Roger Alvarez RN. Pt brought medication on to unit and threw in waste basket in bathroom.

## 2021-02-16 NOTE — ED PROVIDER NOTES
[facility] EMERGENCY DEPARTMENT ([facility])   2021   ED 16A 12:35 PM   History     Chief Complaint   Patient presents with     Withdrawal     Sent here by his treatment facility to detox from benzo use. Uses klonopin, last used today. Also uses xanax 2mg that he trades his klonopin for     The history is provided by the patient and medical records.     Markus Mosley is a 35 year old male who presents seeking detox for benzodiazepines.  Patient had a rule 25 completed, presented to [facility] treatment facility thinking that he would be able to detox there.  He was sent here for detox.  Patient states that he is able to return to [place] when his detox is completed here as is close and belongings are there.  He has been using 4 mg Xanax daily x 6 months.  His last use of benzodiazepines was klonopin this morning at 8:30 AM today. He isn't using klonopin routinely with the Xanax, just took an old script of it. No withdrawal symptoms yet. He does get withdrawal symptoms approximately 8-10 hours after use. No recent alcohol use, last drank 3 weeks ago. He also uses marijuana. No major medical problems.     PAST MEDICAL HISTORY:   Past Medical History:   Diagnosis Date     History of chickenpox        PAST SURGICAL HISTORY: History reviewed. No pertinent surgical history.    Past medical history, past surgical history, medications, and allergies were reviewed with the patient. Additional pertinent items: None    FAMILY HISTORY:   Family History   Problem Relation Age of Onset     Coronary Artery Disease Early Onset Father 60     Diabetes No family hx of      Cancer No family hx of        SOCIAL HISTORY:   Social History     Tobacco Use     Smoking status: Current Every Day Smoker     Types: Vaping Device     Last attempt to quit: 2014     Years since quittin.4     Smokeless tobacco: Former User     Tobacco comment: uses E-Cig   Substance Use Topics     Alcohol use: Yes     Alcohol/week:  0.0 standard drinks     Comment: socially     Social history was reviewed with the patient. Additional pertinent items: None      Patient's Medications   New Prescriptions    No medications on file   Previous Medications    ARIPIPRAZOLE (ABILIFY) 20 MG TABLET    Take 20 mg by mouth daily    CLONAZEPAM (KLONOPIN) 1 MG TABLET    Take 1 mg by mouth 2 times daily as needed for anxiety    DOXYCYCLINE HYCLATE (VIBRAMYCIN) 50 MG CAPSULE    Take 40 mg by mouth 2 times daily    MULTIVITAMIN, THERAPEUTIC WITH MINERALS (THERA-VIT-M) TABS    Take 1 tablet by mouth daily    PROPRANOLOL (INDERAL) 20 MG TABLET    Take 20 mg by mouth 2 times daily as needed    QUETIAPINE (SEROQUEL) 25 MG TABLET    Take 25 mg by mouth 2 times daily   Modified Medications    No medications on file   Discontinued Medications    OXYCODONE (ROXICODONE) 5 MG IR TABLET    Take 1 tablet (5 mg) by mouth every 6 hours as needed for pain          Allergies   Allergen Reactions     Penicillins Swelling        Review of Systems   Constitutional: Negative for fever.   Respiratory: Negative for shortness of breath.    Cardiovascular: Negative for chest pain.   Gastrointestinal: Negative for abdominal pain.   Psychiatric/Behavioral: Negative for suicidal ideas.   All other systems reviewed and are negative.    A complete review of systems was performed with pertinent positives and negatives noted in the HPI, and all other systems negative.    Physical Exam   BP: 135/84  Pulse: 77  Temp: 98.7  F (37.1  C)  Resp: 14  SpO2: 97 %      Physical Exam  Vitals signs and nursing note reviewed.   Constitutional:       General: He is not in acute distress.     Appearance: He is well-developed.   HENT:      Head: Normocephalic.   Eyes:      Extraocular Movements: Extraocular movements intact.   Neck:      Musculoskeletal: Neck supple.   Pulmonary:      Effort: No respiratory distress.   Musculoskeletal:         General: No deformity.   Skin:     General: Skin is dry.    Neurological:      Mental Status: He is alert.      Comments: Oriented   Psychiatric:         Behavior: Behavior normal.         ED Course        Procedures                Results for orders placed or performed during the hospital encounter of 02/16/21   CBC with platelets differential     Status: None   Result Value Ref Range    WBC 8.1 4.0 - 11.0 10e9/L    RBC Count 4.76 4.4 - 5.9 10e12/L    Hemoglobin 14.2 13.3 - 17.7 g/dL    Hematocrit 42.6 40.0 - 53.0 %    MCV 90 78 - 100 fl    MCH 29.8 26.5 - 33.0 pg    MCHC 33.3 31.5 - 36.5 g/dL    RDW 12.6 10.0 - 15.0 %    Platelet Count 268 150 - 450 10e9/L    Diff Method Automated Method     % Neutrophils 63.7 %    % Lymphocytes 25.2 %    % Monocytes 6.3 %    % Eosinophils 3.1 %    % Basophils 1.1 %    % Immature Granulocytes 0.6 %    Nucleated RBCs 0 0 /100    Absolute Neutrophil 5.2 1.6 - 8.3 10e9/L    Absolute Lymphocytes 2.0 0.8 - 5.3 10e9/L    Absolute Monocytes 0.5 0.0 - 1.3 10e9/L    Absolute Eosinophils 0.3 0.0 - 0.7 10e9/L    Absolute Basophils 0.1 0.0 - 0.2 10e9/L    Abs Immature Granulocytes 0.1 0 - 0.4 10e9/L    Absolute Nucleated RBC 0.0    Comprehensive metabolic panel     Status: None (In process)   Result Value Ref Range    Sodium 141 133 - 144 mmol/L    Potassium 4.0 3.4 - 5.3 mmol/L    Chloride 107 94 - 109 mmol/L    Carbon Dioxide 28 20 - 32 mmol/L    Anion Gap 6 3 - 14 mmol/L    Glucose 97 70 - 99 mg/dL    Urea Nitrogen 12 7 - 30 mg/dL    Creatinine 0.85 0.66 - 1.25 mg/dL    GFR Estimate >90 >60 mL/min/[1.73_m2]    GFR Estimate If Black >90 >60 mL/min/[1.73_m2]    Calcium 9.3 8.5 - 10.1 mg/dL    Bilirubin Total PENDING 0.2 - 1.3 mg/dL    Albumin PENDING 3.4 - 5.0 g/dL    Protein Total PENDING 6.8 - 8.8 g/dL    Alkaline Phosphatase PENDING 40 - 150 U/L    ALT PENDING 0 - 70 U/L    AST PENDING 0 - 45 U/L               Results for orders placed or performed during the hospital encounter of 02/16/21 (from the past 24 hour(s))   CBC with platelets  differential   Result Value Ref Range    WBC 8.1 4.0 - 11.0 10e9/L    RBC Count 4.76 4.4 - 5.9 10e12/L    Hemoglobin 14.2 13.3 - 17.7 g/dL    Hematocrit 42.6 40.0 - 53.0 %    MCV 90 78 - 100 fl    MCH 29.8 26.5 - 33.0 pg    MCHC 33.3 31.5 - 36.5 g/dL    RDW 12.6 10.0 - 15.0 %    Platelet Count 268 150 - 450 10e9/L    Diff Method Automated Method     % Neutrophils 63.7 %    % Lymphocytes 25.2 %    % Monocytes 6.3 %    % Eosinophils 3.1 %    % Basophils 1.1 %    % Immature Granulocytes 0.6 %    Nucleated RBCs 0 0 /100    Absolute Neutrophil 5.2 1.6 - 8.3 10e9/L    Absolute Lymphocytes 2.0 0.8 - 5.3 10e9/L    Absolute Monocytes 0.5 0.0 - 1.3 10e9/L    Absolute Eosinophils 0.3 0.0 - 0.7 10e9/L    Absolute Basophils 0.1 0.0 - 0.2 10e9/L    Abs Immature Granulocytes 0.1 0 - 0.4 10e9/L    Absolute Nucleated RBC 0.0    Comprehensive metabolic panel   Result Value Ref Range    Sodium 141 133 - 144 mmol/L    Potassium 4.0 3.4 - 5.3 mmol/L    Chloride 107 94 - 109 mmol/L    Carbon Dioxide 28 20 - 32 mmol/L    Anion Gap 6 3 - 14 mmol/L    Glucose 97 70 - 99 mg/dL    Urea Nitrogen 12 7 - 30 mg/dL    Creatinine 0.85 0.66 - 1.25 mg/dL    GFR Estimate >90 >60 mL/min/[1.73_m2]    GFR Estimate If Black >90 >60 mL/min/[1.73_m2]    Calcium 9.3 8.5 - 10.1 mg/dL    Bilirubin Total PENDING 0.2 - 1.3 mg/dL    Albumin PENDING 3.4 - 5.0 g/dL    Protein Total PENDING 6.8 - 8.8 g/dL    Alkaline Phosphatase PENDING 40 - 150 U/L    ALT PENDING 0 - 70 U/L    AST PENDING 0 - 45 U/L     Medications - No data to display          Assessments & Plan (with Medical Decision Making)   Patient presents to us with a chief complaint of benzodiazepine dependence requesting detox.  Patient is not actively suicidal or homicidal and has no significant withdrawal symptoms at this time.  Patient is medically cleared and clinical report was given to mental health intake.  We do have a bed available on the detox unit.    I have reviewed the nursing notes.    I have  reviewed the findings, diagnosis, plan and need for follow up with the patient.    New Prescriptions    No medications on file       Final diagnoses:   Benzodiazepine dependence (H)     I, Debi Guardado, am serving as a trained medical scribe to document services personally performed by Christopher Mcwilliams DO based on the provider's statements to me on February 16, 2021.  This document has been checked and approved by the attending provider.    I, Christopher Mcwilliams DO, was physically present and have reviewed and verified the accuracy of this note documented by Debi Guardado, medical scribe.     2/16/2021   [facility] EMERGENCY DEPARTMENT     Christopher Mcwilliams DO  02/16/21 [id number]

## 2021-02-16 NOTE — PHARMACY-ADMISSION MEDICATION HISTORY
Admission Medication History Completed by Pharmacy    See [facility] Admission Navigator for allergy information, preferred outpatient pharmacy, prior to admission medications and immunization status.     Medication history sources:  Discharge Summary 2/10/21 (Care Everywhere - [facility]) only    Changes made to PTA medication list (all per [facility] Discharge Summary)  Added:   - Viibryd, trazodone  Deleted: N/A  Changed:   - clonazepam-->scheduled  - aripiprazole-->20 mg HS  - propranolol-->10 mg BID PRN  - quetiapine-->25 mg daily PRN    Additional medication history information:   - Patient not interviewed as part of this medication history. Please discuss any OTC/non-prescription medication use and last doses with the patient that may not be reflected in the list below.    Prior to Admission medications    Medication Sig Last Dose Taking? Auth Provider   ARIPiprazole (ABILIFY) 20 MG tablet Take 20 mg by mouth At Bedtime  2/16/2021 Yes Reported, Patient   clonazePAM (KLONOPIN) 1 MG tablet Take 1 mg by mouth 2 times daily  2/16/2021 Yes Reported, Patient   propranolol (INDERAL) 10 MG tablet Take 10 mg by mouth 2 times daily as needed  Past Week Yes Reported, Patient   QUEtiapine (SEROQUEL) 25 MG tablet Take 25 mg by mouth daily as needed  Past Week Yes Reported, Patient   traZODone (DESYREL) 50 MG tablet Take 50 mg by mouth At Bedtime  Yes Unknown, Entered By History   vilazodone (VIIBRYD) 40 MG TABS tablet Take 40 mg by mouth daily  Yes Unknown, Entered By History     Date completed: 02/16/21    Medication history completed by:   Markus Walker, PharmD, BCPS  General acute hospital  Emergency Department: Ascom *[id number]

## 2021-02-16 NOTE — ED NOTES
ED to Behavioral Floor Handoff    SITUATION  Markus Mosley is a 35 year old male who speaks English and lives with others The patient arrived in the ED by private car from [facility] with a complaint of Withdrawal (Sent here by his treatment facility to detox from benzo use. Uses klonopin, last used today. Also uses xanax 2mg that he trades his klonopin for)  .The patient's current symptoms started/worsened 1 hour(s) ago and during this time the symptoms have increased.   In the ED, pt was diagnosed with   Final diagnoses:   Benzodiazepine dependence (H)        Initial vitals were: BP: 135/84  Pulse: 77  Temp: 98.7  F (37.1  C)  Resp: 14  SpO2: 97 %   --------  Is the patient diabetic? No   If yes, last blood glucose? --     If yes, was this treated in the ED? --  --------  Is the patient inebriated (ETOH) No or Impaired on other substances? Yes  MSSA done? No  Last MSSA score: --    Were withdrawal symptoms treated? No  Does the patient have a seizure history? No. If yes, date of most recent seizure--  --------  Is the patient patient experiencing suicidal ideation? denies current or recent suicidal ideation     Homicidal ideation? denies current or recent homicidal ideation or behaviors.    Self-injurious behavior/urges? denies current or recent self injurious behavior or ideation.  ------  Was pt aggressive in the ED No  Was a code called No  Is the pt now cooperative? Yes  -------  Meds given in ED:   Medications   nicotine polacrilex (NICORETTE) gum 4 mg (4 mg Buccal Given 2/16/21 3778)      Family present during ED course? No  Family currently present? No    BACKGROUND  Does the patient have a cognitive impairment or developmental disability? No  Allergies:   Allergies   Allergen Reactions     Penicillins Swelling   .   Social demographics are   Social History     Socioeconomic History     Marital status: Single     Spouse name: None     Number of children: None     Years of education: None     Highest  education level: None   Occupational History     Occupation: medical devices   Social Needs     Financial resource strain: None     Food insecurity     Worry: None     Inability: None     Transportation needs     Medical: None     Non-medical: None   Tobacco Use     Smoking status: Current Every Day Smoker     Types: Vaping Device     Last attempt to quit: 2014     Years since quittin.4     Smokeless tobacco: Former User     Tobacco comment: uses E-Cig   Substance and Sexual Activity     Alcohol use: Yes     Alcohol/week: 0.0 standard drinks     Comment: socially     Drug use: Yes     Types: Benzodiazepines, Marijuana     Sexual activity: Not Currently     Partners: Female   Lifestyle     Physical activity     Days per week: None     Minutes per session: None     Stress: None   Relationships     Social connections     Talks on phone: None     Gets together: None     Attends Confucianism service: None     Active member of club or organization: None     Attends meetings of clubs or organizations: None     Relationship status: None     Intimate partner violence     Fear of current or ex partner: None     Emotionally abused: None     Physically abused: None     Forced sexual activity: None   Other Topics Concern      Service No     Blood Transfusions No     Caffeine Concern Yes     Comment: 6 cups caffeinated beverage daily     Occupational Exposure Not Asked     Hobby Hazards Not Asked     Sleep Concern Not Asked     Stress Concern Not Asked     Weight Concern Not Asked     Special Diet Not Asked     Back Care Not Asked     Exercise Not Asked     Bike Helmet Not Asked     Seat Belt No     Comment: wears     Self-Exams Not Asked   Social History Narrative    Completed HS.  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         ASSESSMENT  Labs results   Labs Ordered and Resulted from Time of ED Arrival Up to the Time of Departure from the ED   DRUG ABUSE SCREEN 6 CHEM  DEP URINE ([facility]) - Abnormal; Notable for the following components:       Result Value    Benzodiazepine Qual Urine Positive (*)     Cannabinoids Qual Urine Positive (*)     All other components within normal limits   ALCOHOL BREATH TEST POCT - Normal   CBC WITH PLATELETS DIFFERENTIAL   COMPREHENSIVE METABOLIC PANEL   SARS-COV-2 (COVID-19) VIRUS RT-PCR      Imaging Studies: No results found for this or any previous visit (from the past 24 hour(s)).   Most recent vital signs /84   Pulse 77   Temp 98.7  F (37.1  C) (Oral)   Resp 14   SpO2 97%    Abnormal labs/tests/findings requiring intervention:---   Pain control: pt had none  Nausea control: pt had none    RECOMMENDATION  Are any infection precautions needed (MRSA, VRE, etc.)? No If yes, what infection? --  ---  Does the patient have mobility issues? independently. If yes, what device does the pt use? ---  ---  Is patient on 72 hour hold or commitment? No If on 72 hour hold, have hold and rights been given to patient? N/A  Are admitting orders written if after 10 p.m. ?N/A  Tasks needing to be completed:---     Amirah Smith, RN   ascom--    [phone]   [phone] [facility] ED

## 2021-02-16 NOTE — PROGRESS NOTES
02/16/21 1624   Patient Belongings   Did you bring any home meds/supplements to the hospital?  Yes   Disposition of meds  Sent to security/pharmacy per site process   Patient Belongings other (see comments);locker   Patient Belongings Put in Hospital Secure Location (Security or Locker, etc.) other (see comments)   Belongings Search Yes   Clothing Search Yes   Second Staff [employer]   Comment see my note   Storage Bin   Shoes w/laces, pants w/string, hat, 2x mask, water bottle  Medical Room Bin   Cell phone, adaptors, wallet, sunglasses, lighter  Security Envelope   Nothing   A             Admission:  I am responsible for any personal items that are not sent to the safe or pharmacy.  [facility] is not responsible for loss, theft or damage of any property in my possession.  Signature:  _________________________________ Date: _______  Time: _____                                              Staff Signature:  ____________________________ Date: ________  Time: _____      2nd Staff person, if patient is unable/unwilling to sign:    Signature: ________________________________ Date: ________  Time: _____   Discharge:  [facility] has returned all of my personal belongings:  Signature: _________________________________ Date: ________  Time: _____                                          Staff Signature:  ____________________________ Date: ________  Time: _____

## 2021-02-17 LAB
CHOLEST SERPL-MCNC: 195 MG/DL
HDLC SERPL-MCNC: 44 MG/DL
LDLC SERPL CALC-MCNC: 108 MG/DL
NONHDLC SERPL-MCNC: 151 MG/DL
TRIGL SERPL-MCNC: 216 MG/DL
TSH SERPL DL<=0.005 MIU/L-ACNC: 2.7 MU/L (ref 0.4–4)

## 2021-02-17 PROCEDURE — 99231 SBSQ HOSP IP/OBS SF/LOW 25: CPT | Performed by: PHYSICIAN ASSISTANT

## 2021-02-17 PROCEDURE — 99207 PR CONSULT E&M CHANGED TO SUBSEQUENT LEVEL: CPT | Performed by: PHYSICIAN ASSISTANT

## 2021-02-17 PROCEDURE — 84443 ASSAY THYROID STIM HORMONE: CPT | Performed by: PSYCHIATRY & NEUROLOGY

## 2021-02-17 PROCEDURE — 128N000004 HC R&B CD ADULT

## 2021-02-17 PROCEDURE — 250N000013 HC RX MED GY IP 250 OP 250 PS 637: Performed by: PSYCHIATRY & NEUROLOGY

## 2021-02-17 PROCEDURE — 36415 COLL VENOUS BLD VENIPUNCTURE: CPT | Performed by: PSYCHIATRY & NEUROLOGY

## 2021-02-17 PROCEDURE — HZ2ZZZZ DETOXIFICATION SERVICES FOR SUBSTANCE ABUSE TREATMENT: ICD-10-PCS | Performed by: PSYCHIATRY & NEUROLOGY

## 2021-02-17 PROCEDURE — 80061 LIPID PANEL: CPT | Performed by: PSYCHIATRY & NEUROLOGY

## 2021-02-17 PROCEDURE — 99223 1ST HOSP IP/OBS HIGH 75: CPT | Mod: AI | Performed by: PSYCHIATRY & NEUROLOGY

## 2021-02-17 RX ORDER — IBUPROFEN 600 MG/1
600 TABLET, FILM COATED ORAL EVERY 6 HOURS PRN
Status: DISCONTINUED | OUTPATIENT
Start: 2021-02-17 | End: 2021-02-22 | Stop reason: HOSPADM

## 2021-02-17 RX ADMIN — ARIPIPRAZOLE 20 MG: 10 TABLET ORAL at 21:26

## 2021-02-17 RX ADMIN — IBUPROFEN 600 MG: 600 TABLET ORAL at 11:02

## 2021-02-17 RX ADMIN — NICOTINE POLACRILEX 4 MG: 4 GUM, CHEWING BUCCAL at 08:59

## 2021-02-17 RX ADMIN — NICOTINE POLACRILEX 4 MG: 4 GUM, CHEWING BUCCAL at 21:26

## 2021-02-17 RX ADMIN — PHENOBARBITAL 64.8 MG: 64.8 TABLET ORAL at 08:22

## 2021-02-17 RX ADMIN — PHENOBARBITAL 64.8 MG: 64.8 TABLET ORAL at 16:22

## 2021-02-17 RX ADMIN — ACETAMINOPHEN 650 MG: 325 TABLET, FILM COATED ORAL at 08:23

## 2021-02-17 RX ADMIN — NICOTINE POLACRILEX 4 MG: 4 GUM, CHEWING BUCCAL at 12:04

## 2021-02-17 RX ADMIN — ACETAMINOPHEN 650 MG: 325 TABLET, FILM COATED ORAL at 16:22

## 2021-02-17 RX ADMIN — NICOTINE POLACRILEX 4 MG: 4 GUM, CHEWING BUCCAL at 16:22

## 2021-02-17 RX ADMIN — VILAZODONE HYDROCHLORIDE 40 MG: 40 TABLET ORAL at 08:22

## 2021-02-17 RX ADMIN — NICOTINE POLACRILEX 4 MG: 4 GUM, CHEWING BUCCAL at 10:15

## 2021-02-17 RX ADMIN — NICOTINE POLACRILEX 4 MG: 4 GUM, CHEWING BUCCAL at 11:02

## 2021-02-17 RX ADMIN — IBUPROFEN 600 MG: 600 TABLET ORAL at 21:26

## 2021-02-17 RX ADMIN — PHENOBARBITAL 64.8 MG: 64.8 TABLET ORAL at 21:25

## 2021-02-17 RX ADMIN — NICOTINE POLACRILEX 4 MG: 4 GUM, CHEWING BUCCAL at 15:12

## 2021-02-17 RX ADMIN — PHENOBARBITAL 64.8 MG: 64.8 TABLET ORAL at 12:04

## 2021-02-17 ASSESSMENT — ACTIVITIES OF DAILY LIVING (ADL)
DRESS: INDEPENDENT
HYGIENE/GROOMING: INDEPENDENT
ORAL_HYGIENE: INDEPENDENT

## 2021-02-17 NOTE — PLAN OF CARE
"  Problem: Substance Withdrawal  Goal: Substance Withdrawal  Description: Signs and symptoms of listed problems will be absent or manageable.  Outcome: No Change    MSSA scores of 2 and 4; Alert and oriented x 4, denies any significant anxiety, sweating, tremor or hallucinations; using nicotine gum and tylenol/ibuprofen prn-\"helps\" for back pain \"pulled muscle from stretching\", patient denies SI/SIB/HI and also inquires if he might be able to discharge Friday, \"I do not seem to be having withdrawal symptoms\" patient responds. Patient also requesting to order food from the outside. Visible in milieu, using phone, asks for a water pitcher, taking medications-no side effects, no fall/seizure and appears to be coping well on unit; accepting of education and direction.     02/17/21 1612   Vital Signs   Temp 97.9  F (36.6  C)   Temp src Temporal   Resp 16   Pulse 64   Pulse Rate Source Monitor   BP (!) 142/90        02/17/21 2004   Vital Signs   Temp 97.6  F (36.4  C)   Temp src Temporal   Resp 16   Pulse 80   Pulse Rate Source Monitor   /89   BP Location Right arm     "

## 2021-02-17 NOTE — PROGRESS NOTES
Met with Pt to initiate discharge planning.  Pt reports he has a bed held at [facility].  Pt signed AILYN and call placed to [facility] to coordinate door to door admission.

## 2021-02-17 NOTE — PROGRESS NOTES
Pt admitted to unit to detox from benzodiazepines, pt states that he is taking 4-6 mg of xanax daily. Pt has a prescription for klonopin that he states he trades for xanax, pt also states that he took some klonopin this morning. Pt had a prescription for 60 tablets of klonopin filled 1/28/21 and had 11 tablets left when admitted to the unit. Pt also smokes marijuana daily, denies any other substance use. Pt has never been to detox before, he has been to residential chemical dependency treatment 2 times and outpatient treatment 3 times. Pt is set up to admit to [facility] when done with detox here, he has a bed on hold. Pt states that he has schizoaffective disorder and has paranoia and auditory hallucinations at baseline. Pt denies command hallucinations but states the voices are very mean and tell him he is worthless and other negative comments. Pt denies SI.

## 2021-02-17 NOTE — PLAN OF CARE
Behavioral Team Discussion: (2/17/2021)    Continued Stay Criteria/Rationale: Patient admitted for benzodiazepine withdrawal, complicated.  Plan: The following services will be provided to the patient; psychiatric assessment, medication management, therapeutic milieu, individual and group support, and skills groups.   Participants: 3A Provider: Dr. Addison Hung MD; 3A RN's: Man Montemayor, RN; 3A CM's: Aimee King [facility], Mary Newman MA [facility] and Della Oliveira [facility]   Summary/Recommendation: Providers will assess today for treatment recommendations, discharge planning, and aftercare plans. CM will meet with pt for discharge planning.   Medical/Physical: Internal medicine consult to be completed 2/17/2021.  Precautions:   Behavioral Orders   Procedures     Code 1 - Restrict to Unit     Routine Programming     As clinically indicated     Seizure precautions     Status 15     Every 15 minutes.     Withdrawal precautions     Rationale for change in precautions or plan: N/A  Progress: No Change.

## 2021-02-17 NOTE — PROGRESS NOTES
PDMP as of 2/17/2021:     Markus Mosley   Risk Indicators   NARX SCORES  Narcotic  330  Sedative  361  Stimulant  000  Explanation and Guidance  OVERDOSE RISK SCORE     490    (Range 000-999)  Explanation and Guidance  ADDITIONAL RISK INDICATORS ( 0 )     Explanation and Guidance   This NarxCare report is based on search criteria supplied and the data entered by the dispensing pharmacy. For more information about any prescription, please contact the dispensing pharmacy or the prescriber. NarxCare scores and reports are intended to aid, not replace, medical decision making. None of the information presented should be used as sole justification for providing or refusing to provide medications. The information on this report is not warranted as accurate or complete.   Graphs   RX GRAPH  Narcotic Buprenorphine Sedative Stimulant Other     All Prescribers    Prescribers    2 - Anitha Doran    1 - Magno Coleman    Timeline  02/17  2m  6m  1y  2y    Buprenorphine mg    16    4    0  28    Timeline  02/17  2m  6m  1y  2y  Morphine MgEq (MME)    200    80    0  320    Timeline  02/17  2m  6m  1y  2y    Lorazepam MgEq (LME)    10    2    0  18    Timeline 02/17  2m  6m  1y  2y    *Per CDC guidance, the MME conversion factors prescribed or provided as part of the medication-assisted treatment for opioid use disorder should not be used to benchmark against dosage thresholds meant for opioids prescribed for pain. Buprenorphine products have no agreed upon morphine equivalency, and as partial opioid agonists, are not expected to be associated with overdose risk in the same dose-dependent manner as doses for full agonist opioids. MME = morphine milligram equivalents. LME = Lorazepam milligram equivalents. mg = dose in milligrams.     Summary     Summary  Total Prescriptions:     6   Total Prescribers:     2   Total Pharmacies:     4   Narcotics* (excluding Buprenorphine)  Current Qty:     0   Current MME/day:     0.00  30  Day Avg MME/day:     0.00   Sedatives*  Current Qty:     44   Current LME/day:    4.00  30 Day Avg LME/day:    0.93  Buprenorphine*  Current Qty:     0   Current mg/day:    0.00  30 Day Avg mg/day:    0.00  Rx Data   PRESCRIPTIONS  Total Prescriptions:  6  Total Private Pay:  0  Fill Date  ID  Written  Sold  Drug  Qty  Days  Prescriber  Rx #  Pharmacy  Refill  Daily Dose *  Pymt Type    02/10/2021   1   02/10/2021     Clonazepam 1 Mg Tablet   60.00  30  [facility]   [id number]   All ([id number])   0/0  4.00 LME  [place]  10/29/2020   2   10/29/2020   10/29/2020   Oxycodone Hcl 5 Mg Tablet   10.00  2  [facility]   [id number]   [facility] ([id number])   0/0  37.50 MME  [place]  10/19/2020   2   10/19/2020   10/19/2020   Oxycodone Hcl 5 Mg Tablet   30.00  7  [facility]   [id number]   [facility] (1802)   0/0  32.14 MME  [place]  10/10/2020   2   10/10/2020   10/10/2020   Oxycodone Hcl 5 Mg Tablet   30.00  4  [facility]   [id number]   [facility] ([id number])   0/0  56.25 MME  [place]  10/05/2020   4   10/05/2020   10/05/2020   Oxycodone Hcl 5 Mg Tablet   30.00  7  [facility]   [id number]   [facility] (1802)   0/0  32.14 MME  [place]  09/28/2020   3   09/28/2020     Oxycodone Hcl 5 Mg Tablet   30.00  4  [facility]   [id number]   [facility] ([facility])   0/0  56.25 MME  [place]  *Per CDC guidance, the MME conversion factors prescribed or provided as part of the medication-assisted treatment for opioid use disorder should not be used to benchmark against dosage thresholds meant for opioids prescribed for pain. Buprenorphine products have no agreed upon morphine equivalency, and as partial opioid agonists, are not expected to be associated with overdose risk in the same dose-dependent manner as doses for full agonist opioids. MME = morphine milligram equivalents. LME = Lorazepam milligram equivalents. mg = dose in milligrams.     Providers  Total Providers: 2   Name   Address   City   State   Zipcode   Phone   Anitha Espinoza  [address] [facility] [address]    [facility]  [address]  [phone]  Magno Coleman  [address] [address]   [facility]  [address]  [phone]  Pharmacies  Total Pharmacies: 4   Name   Address   City   State   Zipcode   Phone   [facility] ([id number])  [address]   [facility]  [address]  [phone]  [facility] (5269) [address]   [place]  [place]  [address] [phone]  [facility] ([facility])  [address]   [place]  [place]  [address] [phone]  [facility] (4441) [address]   [facility]  [address] [phone]    The report provided is based upon the search criteria entered and the corresponding data as it has been reported by dispenser(s). If erroneous information is identified or additional information is needed, please contact the dispenser or the prescriber provided on the report. Date Sold signifies the date the prescription was sold (left the pharmacy). The absence of Date Sold does not necessarily indicate the prescription was not dispensed. Fill Date represents the date the medication was filled or prepared by the pharmacy. Note, federal regulation (CFR Title 42: Part 2) requires patient consent prior to releasing certain patient data from federally funded opioid treatment programs (OTPs). As such, controlled substances dispensed from OTPs for medication-assisted treatment may not appear in the [place]  report. Morphine milligram equivalent (MME) conversion factors published by the CDC are used in the MME calculation. Per the CDC, the MME conversion factor is intended only for analytic purposes where prescription data are used to retrospectively calculate daily MME to inform analyses of risks associated with opioid prescribing. This value does not constitute clinical guidance or recommendations for converting patients from one form of opioid analgesic to another. Per the CDC, the conversion factors for drugs prescribed or provided, as part of medication-assisted  treatment for opioid use disorder should not be used to benchmark against MME dosage thresholds meant for opioids prescribed for pain. Buprenorphine products listed in the [facility] s MME file do not have an associated conversion factor. Lastly, the [facility] notes, in clinical practice, calculating MME for methadone often involves a sliding-scale approach, whereby the conversion factor increases with increasing dose. The conversion factor of 3 for methadone presented in this file could underestimate MME for a given patient. This report contains confidential information, including patient identifiers, and is not a public record. The information on this report must be treated as protected health information and is only to be disclosed to others as authorized by applicable state and Federal regulations.           Powered By       [place] Prescription Monitoring Program  [place] Board of Pharmacy  [address] [place] [address] [address]  [place] [address]  1 [phone]     [employer] 2021. All Rights Reserved. Privacy Policy

## 2021-02-17 NOTE — PLAN OF CARE
"Pt continues to be monitored for benzodiazepine withdrawal. MSSA = 6 and 2 this shift. His symptoms are improving due to phenobarbital therapy. He is pleasant and cooperative. His behavior is appropriate. He denies SI/SIB/HI. His discharge plan has yet to be determined. Probable discharge on Monday.  BP (!) 144/95   Pulse 76   Temp 97.8  F (36.6  C) (Temporal)   Resp 16   Ht 1.727 m (5' 8\")   Wt 119.7 kg (264 lb)   SpO2 100%   BMI 40.14 kg/m        "

## 2021-02-17 NOTE — CONSULTS
Consult Date:  2021      HISTORY OF PRESENT ILLNESS:  The patient is a pleasant 35-year-old gentleman with history of substance abuse, admitted to [facility] for benzodiazepine abuse and withdrawal.  An Internal Medicine consultation was ordered by Dr. Hung to assess medical problems including mild dyslipidemia on admission labs.  At this time, Markus states he feels good and denies acute physical concerns including fever, chills, chest pain, shortness of breath, abdominal pain, nausea, bowel or bladder concerns.      PAST MEDICAL HISTORY:   1.  Benzodiazepine dependence and other psychiatric history per Dr. Hung.   2.  Denies history of major medical problems including cardiopulmonary disease, hypertension and diabetes.      History reviewed. No pertinent surgical history.      ADMISSION MEDICATIONS:  Reviewed and listed in the medication reconciliation list.      ALLERGIES:  PENICILLIN.      Social History     Socioeconomic History     Marital status: Single     Spouse name: Not on file     Number of children: Not on file     Years of education: Not on file     Highest education level: Not on file   Occupational History     Occupation: medical devices   Social Needs     Financial resource strain: Not on file     Food insecurity     Worry: Not on file     Inability: Not on file     Transportation needs     Medical: Not on file     Non-medical: Not on file   Tobacco Use     Smoking status: Current Every Day Smoker     Types: Vaping Device     Last attempt to quit: 2014     Years since quittin.4     Smokeless tobacco: Former User     Tobacco comment: uses E-Cig   Substance and Sexual Activity     Alcohol use: Yes     Alcohol/week: 0.0 standard drinks     Comment: socially     Drug use: Yes     Types: Benzodiazepines, Marijuana     Sexual activity: Not Currently     Partners: Female   Lifestyle     Physical activity     Days per week: Not on file     Minutes per session: Not on file     Stress:  Not on file   Relationships     Social connections     Talks on phone: Not on file     Gets together: Not on file     Attends [employer] service: Not on file     Active member of club or organization: Not on file     Attends meetings of clubs or organizations: Not on file     Relationship status: Not on file     Intimate partner violence     Fear of current or ex partner: Not on file     Emotionally abused: Not on file     Physically abused: Not on file     Forced sexual activity: Not on file   Other Topics Concern      Service No     Blood Transfusions No     Caffeine Concern Yes     Comment: 6 cups caffeinated beverage daily     Occupational Exposure Not Asked     Hobby Hazards Not Asked     Sleep Concern Not Asked     Stress Concern Not Asked     Weight Concern Not Asked     Special Diet Not Asked     Back Care Not Asked     Exercise Not Asked     Bike Helmet Not Asked     Seat Belt No     Comment: wears     Self-Exams Not Asked   Social History Narrative    Completed HS.  Attended [employer] with degree in [place], [place].           Family History   Problem Relation Age of Onset     Coronary Artery Disease Early Onset Father 60     Diabetes No family hx of      Cancer No family hx of          REVIEW OF SYSTEMS:  Ten-point review of systems negative except as stated above in history of present illness.      PHYSICAL EXAMINATION:   GENERAL:  Nontoxic appearing man in no acute distress.   VITAL SIGNS:  Stable.  Temperature afebrile, pulse in the 70s, blood pressure 144/95.   HEENT:  Negative.   NECK:  Supple.  No cervical lymphadenopathy or thyromegaly.   LUNGS:  Clear.   CARDIOVASCULAR:  Regular rate and rhythm.   ABDOMEN:  Obese, soft, nontender.   EXTREMITIES:  No edema.   SKIN:  No rash on exposed areas.   NEUROLOGIC:  He is awake, alert and oriented x3.  Cranial nerves grossly intact.  Motor strength is symmetric.  He is not tremulous.      LABORATORY DATA:  Urine drug  screen positive for cannabinoids and benzodiazepines.  Total triglycerides 216, non-, .  Otherwise, CBC, comprehensive metabolic panel, rest of lipid panel and TSH normal.  COVID testing negative.  Breathalyzer negative.      IMPRESSION:   1.  Benzodiazepine abuse and withdrawal per Dr. Hung.   2.  Mild dyslipidemia on admission labs, likely secondary to diet.   3.  Otherwise, overall apparent normally good physical health.      PLAN:  No medical intervention indicated at this time.  He was encouraged to eat a low-fat diet and he should follow-up with his family physician after discharge in 3-6 months for repeat fasting lipid panel.  No further medical recommendations at this time.  The patient is medically stable.  Medicine is signing off.  Please feel free to call with questions.      Thank you for this consultation.         GABY CASTANEDA PA-C             D: 2021   T: 2021   MT: CECILLE      Name:     ALEYDA FERNANDEZ   MRN:      [id number]        Account:       [id number]   :      1985           Consult Date:  2021      Document: [id number]

## 2021-02-17 NOTE — H&P
Markus Mosley is a 35 year old male who was referred by priscila     History was provided by ALANNA who was a fair historian.   CHIEF COMPLAINT:      HISTORY OF PRESENT ILLNESS:    Patient is a 35-year-old  male.  He came here from treatment facility to detox from Xanax.  He was originally going there to detox from alcohol        It is important to note that 2 weeks ago had sucidie attempt this suicide attempt was after command auditory hallucinations asking him to do it he was, hospitalized for it, one week .  He stabilized on Abilify and he reports that he no longer experiences these voices  Patient has been using the following substances: Alcohol  Started at age 14, became a problem at 20  His last use was over a month ago    Patient has tolerance, withdrawal, progressive use, loss of control, spending more time and more amount than intended. Patient has made attempts to quit, is experiencing cravings, and reports negative consequences.         He has been taking Xanax not prescribed for him since age 16 x 25 became more of a problem  He has been taking Xanax 4 mg is also been taking clonazepam 2 mg.  He reports upon his recent discharge he was continued on 8  Patient has tolerance, withdrawal, progressive use, loss of control, spending more time and more amount than intended. Patient has made attempts to quit, is experiencing cravings, and reports negative consequences.  He uses marijuana                   Denies thoughts of suicide or harming others.      Denies auditory or visual hallucinations.     Patient smokes 0 cigarettes    Patient denied any gambling      He denies using any other drugs  PSYCHIATRIC REVIEW OF SYSTEMS:         Psychiatric Review of Systems:   Depression:   Denied any  depressed mood, no suicidal ideation, decreased interest, changes in sleep, changes in appetite, guilt, hopelessness, helplessness, impaired concentration, decreased energy, irritability.   Has a h/o depression  in the past though  Danette:   Denied  sleeplessness, impulsiveness, racing thoughts, increased goal-directed activities, pressured speech, increase in energy  Danette Feeling euphoric,Distractible,Impulsive,Grandiose,Talking excessively,Have energy without sleeping,Mood swings,Irritability  Denies: sleeplessness, increased goal-directed activities, abrupt increase in energy, pressured speech  Psychosis: Patient denied any auditory visual hallucinations paranoia during the interview    started at 4-5   H/o  visual hallucinations, auditory hallucinations, paranoia,  ideas of reference, thought insertions, thought broadcasting.    Anxiety: denied excessive worries that are difficult to control for the past 6 months,   Chronic anxiety , not able to stop worrying impacting sleep, poor conc, irritable , muscle tension    H/o panic attacks sob, heart racing sweaty shaky , nausea    Denies: worries that are difficult to control for the past 6 months, panic attacks  PTSD:     Denies: re-experiencing past trauma, nightmares, increased arousal, avoidance of traumatic stimuli, impaired function.  OCD:     Denies: obsessions, checking, symmetry, cleaning, skin picking.  ED:     Denies: restriction, binging, purging.                  PSYCHIATRIC HISTORY     Previous diagnoses: schizoaffective       Symptoms in relation to substance use (symptoms present without use?): yes     Past court commitments: none  SIB /SUICIDE ATTEMPTS NONE  Psych Hosp :3  Outpatient Programs  none  Inpatient cd [facility]   Out pt cd trt        SOCIAL HISTORY                                                                         Live with partner, unemployed ,no kids           Family History:   FAMILY HISTORY:   Family History   Problem Relation Age of Onset     Coronary Artery Disease Early Onset Father 60     Diabetes No family hx of      Cancer No family hx of      Family Mental Health History-  Mother /sister anxiety depression    Substance Use Problems - sister s abuse alcohol             PTA Medications:     Medications Prior to Admission   Medication Sig Dispense Refill Last Dose     ARIPiprazole (ABILIFY) 20 MG tablet Take 20 mg by mouth At Bedtime    2/16/2021     clonazePAM (KLONOPIN) 1 MG tablet Take 1 mg by mouth 2 times daily    2/16/2021     propranolol (INDERAL) 10 MG tablet Take 10 mg by mouth 2 times daily as needed    Past Week     QUEtiapine (SEROQUEL) 25 MG tablet Take 25 mg by mouth daily as needed    Past Week     traZODone (DESYREL) 50 MG tablet Take 50 mg by mouth At Bedtime        vilazodone (VIIBRYD) 40 MG TABS tablet Take 40 mg by mouth daily             Allergies:     Allergies   Allergen Reactions     Penicillins Swelling          Labs:     Recent Results (from the past 48 hour(s))   CBC with platelets differential    Collection Time: 02/16/21  1:06 PM   Result Value Ref Range    WBC 8.1 4.0 - 11.0 10e9/L    RBC Count 4.76 4.4 - 5.9 10e12/L    Hemoglobin 14.2 13.3 - 17.7 g/dL    Hematocrit 42.6 40.0 - 53.0 %    MCV 90 78 - 100 fl    MCH 29.8 26.5 - 33.0 pg    MCHC 33.3 31.5 - 36.5 g/dL    RDW 12.6 10.0 - 15.0 %    Platelet Count 268 150 - 450 10e9/L    Diff Method Automated Method     % Neutrophils 63.7 %    % Lymphocytes 25.2 %    % Monocytes 6.3 %    % Eosinophils 3.1 %    % Basophils 1.1 %    % Immature Granulocytes 0.6 %    Nucleated RBCs 0 0 /100    Absolute Neutrophil 5.2 1.6 - 8.3 10e9/L    Absolute Lymphocytes 2.0 0.8 - 5.3 10e9/L    Absolute Monocytes 0.5 0.0 - 1.3 10e9/L    Absolute Eosinophils 0.3 0.0 - 0.7 10e9/L    Absolute Basophils 0.1 0.0 - 0.2 10e9/L    Abs Immature Granulocytes 0.1 0 - 0.4 10e9/L    Absolute Nucleated RBC 0.0    Comprehensive metabolic panel    Collection Time: 02/16/21  1:06 PM   Result Value Ref Range    Sodium 141 133 - 144 mmol/L    Potassium 4.0 3.4 - 5.3 mmol/L    Chloride 107 94 - 109 mmol/L    Carbon Dioxide 28 20 - 32 mmol/L    Anion Gap 6 3 - 14 mmol/L    Glucose 97 70 -  99 mg/dL    Urea Nitrogen 12 7 - 30 mg/dL    Creatinine 0.85 0.66 - 1.25 mg/dL    GFR Estimate >90 >60 mL/min/[1.73_m2]    GFR Estimate If Black >90 >60 mL/min/[1.73_m2]    Calcium 9.3 8.5 - 10.1 mg/dL    Bilirubin Total 0.3 0.2 - 1.3 mg/dL    Albumin 4.0 3.4 - 5.0 g/dL    Protein Total 7.4 6.8 - 8.8 g/dL    Alkaline Phosphatase 53 40 - 150 U/L    ALT 35 0 - 70 U/L    AST 18 0 - 45 U/L   Asymptomatic SARS-CoV-2 COVID-19 Virus (Coronavirus) by PCR    Collection Time: 02/16/21  1:07 PM    Specimen: Nasopharyngeal   Result Value Ref Range    SARS-CoV-2 Virus Specimen Source Nasopharyngeal     SARS-CoV-2 PCR Result NEGATIVE     SARS-CoV-2 PCR Comment       Testing was performed using the [employer] Xpress SARS-CoV-2 Assay on the Cepheid Gene-Xpert   Instrument Systems. Additional information about this Emergency Use Authorization (EUA)   assay can be found via the Lab Guide.     Drug abuse screen 6 urine (tox)    Collection Time: 02/16/21  1:27 PM   Result Value Ref Range    Amphetamine Qual Urine Negative NEG^Negative    Barbiturates Qual Urine Negative NEG^Negative    Benzodiazepine Qual Urine Positive (A) NEG^Negative    Cannabinoids Qual Urine Positive (A) NEG^Negative    Cocaine Qual Urine Negative NEG^Negative    Ethanol Qual Urine Negative NEG^Negative    Opiates Qualitative Urine Negative NEG^Negative   Alcohol breath test POCT    Collection Time: 02/16/21  2:01 PM   Result Value Ref Range    Alcohol Breath Test 0.00 0.00 - 0.01   TSH with free T4 reflex and/or T3 as indicated    Collection Time: 02/17/21  7:57 AM   Result Value Ref Range    TSH 2.70 0.40 - 4.00 mU/L   Lipid panel    Collection Time: 02/17/21  7:57 AM   Result Value Ref Range    Cholesterol 195 <200 mg/dL    Triglycerides 216 (H) <150 mg/dL    HDL Cholesterol 44 >39 mg/dL    LDL Cholesterol Calculated 108 (H) <100 mg/dL    Non HDL Cholesterol 151 (H) <130 mg/dL         BP (!) 137/95   Pulse 72   Temp 97.5  F (36.4  C) (Temporal)   Resp 16   Ht  "1.727 m (5' 8\")   Wt 119.7 kg (264 lb)   SpO2 100%   BMI 40.14 kg/m    Weight is 264 lbs 0 oz  Body mass index is 40.14 kg/m .    Physical Exam:     ROS: 10 point ROS neg other than the symptoms noted above in the HPI.            Past Medical History:   PAST MEDICAL HISTORY:   Past Medical History:   Diagnosis Date     History of chickenpox        PAST SURGICAL HISTORY: History reviewed. No pertinent surgical history.    -    -           MENTAL STATUS EXAM:      Constitutional: General appearance of patient:      Appearance:  awake, alert, appeared as age stated, adequate groomed and slightly unkempt  Attitude:  cooperative  Eye Contact:  good  Mood:  content   Affect:  congruent   Speech:  clear, coherent normal rate   Psychomotor Behavior:  no evidence of tardive dyskinesia, dystonia, or tics  Thought Process:  logical, linear and goal oriented  Associations:  no loose associations  Thought Content:  no evidence of psychotic thought and active suicidal ideation present  Denied any active suicidal /homicidation ideation plan intent   Insight:  fair  Judgment:  fair  Oriented to:  time, person, and place  Attention Span and Concentration:  intact  Recent and Remote Memory:  intact  Language:  english with appropriate syntax and vocabulary  Fund of Knowledge: appropriate  Muscle Strength and Tone: normal  Gait and Station: Normal     There are no abnormal or psychotic thoughts, no preoccupations, no overvalued ideas, no rumination, no obsessions, no compulsions, no somatic concerns, no hypochrondriasis, no ideas of reference, and no delusions.  Patient denies homicidal thoughts.   Patient denies suicidal thoughts.  Patient appears to have good judgment and good insight.     Musculoskeletal: Patient shows no abnormalities of motor activity: there is no tremor, no tic, and no dystonia.  There is no apparent muscle atrophy, strength and tone appear normal, and there are no abnormal movements.  Patient has normal gait " and stance.    DISCUSSION:         Assessment:   Inpatient psychiatric hospitalization is warranted at this time for safety, stabilization, and possible adjustment in medications.    Patient has a biological predisposition with family history positive for both mental health and chemical dependency  Psychologically patient is experiencing withdrawal from Xanax he has had recent suicide attempts he has a history of schizoaffective disorder with command auditory hallucination history is also using marijuana  Patient has these particular stressors history of being carbon monoxide poisoning lack sober support relapse prone  Patient has chronic illness exacerbation leading to hospitalization progression as described.     Patient has been unable to stop using drugs in the community due to both physical and psychological symptoms.  Continued use will put the patient at risk for medical and/or psychiatric complications.       Diagnoses:       Benzodiazepine use disorder severe  Benzodiazepine withdrawal  Alcohol use disorder severe in early full remission  Schizoaffective disorder bipolar type         Plan:   Problem list  1#       Patient will detox of benzodiazepines using phenobarbital  We will continue with 60 mg 4 times a day we will monitor based on his sedation will cut down the dose he is having more withdrawal symptoms we will increase the dose    2#  /schizoaffective disorder  We will continue Abilify 20 mg Seroquel 25 mg patient signed consent we will also continue will vilazodone 40 mg  3#  4#  5#             Patient has been unable to stop using drugs in the community due to both physical and psychological symptoms.  Continued use will put the patient at risk for medical and/or psychiatric complications.    I HAVE REVIEWED LABS WITH PT AND TALKED ABOUT RESULTS WITH PT  I HAVE REVIEWED AND SUMMARIZED OLD RECORDS including his medication reconcilation of his home medications  and PDMP   I HAVE SPOKEN WITH RN ABOUT  MEDICATIONS AND DETOX SCORES  I HAVE SPOKEN WITH CM ABOUT PTS TREATMETN OPTIONS   Laboratory/Imaging:    Liver Function Studies -   Recent Labs   Lab Test 02/16/21  1306   PROTTOTAL 7.4   ALBUMIN 4.0   BILITOTAL 0.3   ALKPHOS 53   AST 18   ALT 35      Last Comprehensive Metabolic Panel:  Sodium   Date Value Ref Range Status   02/16/2021 141 133 - 144 mmol/L Final     Potassium   Date Value Ref Range Status   02/16/2021 4.0 3.4 - 5.3 mmol/L Final     Chloride   Date Value Ref Range Status   02/16/2021 107 94 - 109 mmol/L Final     Carbon Dioxide   Date Value Ref Range Status   02/16/2021 28 20 - 32 mmol/L Final     Anion Gap   Date Value Ref Range Status   02/16/2021 6 3 - 14 mmol/L Final     Glucose   Date Value Ref Range Status   02/16/2021 97 70 - 99 mg/dL Final     Urea Nitrogen   Date Value Ref Range Status   02/16/2021 12 7 - 30 mg/dL Final     Creatinine   Date Value Ref Range Status   02/16/2021 0.85 0.66 - 1.25 mg/dL Final     GFR Estimate   Date Value Ref Range Status   02/16/2021 >90 >60 mL/min/[1.73_m2] Final     Comment:     Non  GFR Calc  Starting 12/18/2018, serum creatinine based estimated GFR (eGFR) will be   calculated using the Chronic Kidney Disease Epidemiology Collaboration   (CKD-EPI) equation.       Calcium   Date Value Ref Range Status   02/16/2021 9.3 8.5 - 10.1 mg/dL Final     Bilirubin Total   Date Value Ref Range Status   02/16/2021 0.3 0.2 - 1.3 mg/dL Final     Alkaline Phosphatase   Date Value Ref Range Status   02/16/2021 53 40 - 150 U/L Final     ALT   Date Value Ref Range Status   02/16/2021 35 0 - 70 U/L Final     AST   Date Value Ref Range Status   02/16/2021 18 0 - 45 U/L Final                   Medical treatment/interventions:  Medical concerns: As above    - Consults: IM consult placed. Appreciate assistance.     Legal Status: Voluntary     Safety Assessment:   Checks: Status 15  Pt has not required locked seclusion or restraints in the past 24 hours to maintain  "safety, please refer to RN documentation for further details.    The risks, benefits, alternatives and side effects have been discussed and are understood by the patient.       Patient will be treated in therapeutic milieu with appropriate individual and group therapies as described.  Disposition: Pending clinical stabilization. Pt does  appear interested in CD treatment        \"Much or all of the text in this note was generated through the use of Dragon Dictate voice to text software. Errors in spelling or words which appear to be out of contact are unintentional, may be present due having escaped editing\"     "

## 2021-02-18 PROCEDURE — 99232 SBSQ HOSP IP/OBS MODERATE 35: CPT | Performed by: PSYCHIATRY & NEUROLOGY

## 2021-02-18 PROCEDURE — 128N000004 HC R&B CD ADULT

## 2021-02-18 PROCEDURE — 99207 PR CDG-MDM COMPONENT: MEETS MODERATE - UP CODED: CPT | Performed by: PSYCHIATRY & NEUROLOGY

## 2021-02-18 PROCEDURE — 250N000013 HC RX MED GY IP 250 OP 250 PS 637: Performed by: PSYCHIATRY & NEUROLOGY

## 2021-02-18 RX ADMIN — PHENOBARBITAL 64.8 MG: 64.8 TABLET ORAL at 09:07

## 2021-02-18 RX ADMIN — VILAZODONE HYDROCHLORIDE 40 MG: 40 TABLET ORAL at 09:07

## 2021-02-18 RX ADMIN — NICOTINE POLACRILEX 4 MG: 4 GUM, CHEWING BUCCAL at 16:10

## 2021-02-18 RX ADMIN — NICOTINE POLACRILEX 4 MG: 4 GUM, CHEWING BUCCAL at 09:06

## 2021-02-18 RX ADMIN — HYDROXYZINE HYDROCHLORIDE 25 MG: 25 TABLET, FILM COATED ORAL at 01:43

## 2021-02-18 RX ADMIN — NICOTINE POLACRILEX 4 MG: 4 GUM, CHEWING BUCCAL at 21:08

## 2021-02-18 RX ADMIN — TRAZODONE HYDROCHLORIDE 50 MG: 50 TABLET ORAL at 21:08

## 2021-02-18 RX ADMIN — PHENOBARBITAL 64.8 MG: 64.8 TABLET ORAL at 12:14

## 2021-02-18 RX ADMIN — IBUPROFEN 600 MG: 600 TABLET ORAL at 21:08

## 2021-02-18 RX ADMIN — IBUPROFEN 600 MG: 600 TABLET ORAL at 09:06

## 2021-02-18 RX ADMIN — NICOTINE POLACRILEX 4 MG: 4 GUM, CHEWING BUCCAL at 01:43

## 2021-02-18 RX ADMIN — NICOTINE POLACRILEX 4 MG: 4 GUM, CHEWING BUCCAL at 17:11

## 2021-02-18 RX ADMIN — TRAZODONE HYDROCHLORIDE 50 MG: 50 TABLET ORAL at 01:43

## 2021-02-18 RX ADMIN — NICOTINE POLACRILEX 4 MG: 4 GUM, CHEWING BUCCAL at 20:04

## 2021-02-18 RX ADMIN — PHENOBARBITAL 64.8 MG: 64.8 TABLET ORAL at 16:28

## 2021-02-18 RX ADMIN — NICOTINE POLACRILEX 4 MG: 4 GUM, CHEWING BUCCAL at 12:14

## 2021-02-18 RX ADMIN — NICOTINE POLACRILEX 4 MG: 4 GUM, CHEWING BUCCAL at 11:07

## 2021-02-18 RX ADMIN — ARIPIPRAZOLE 20 MG: 10 TABLET ORAL at 21:08

## 2021-02-18 NOTE — PROGRESS NOTES
Received a call from [facility].  Pt called them and informed them that he was discharging on Saturday.  They are requesting door to door transfer with [facility] provided transportation on Monday Feb. 22, time TBD.  RN, MD, and Pt informed.  Update:  Pt will be picked up at 1300 on Monday, Feb 22nd.  Medications should be handed off to .

## 2021-02-18 NOTE — PROGRESS NOTES
MSSA score of 3,pt required no valium for alcohol withdrawal this shift. Pt is observed to sleep throughout the noc.

## 2021-02-18 NOTE — PLAN OF CARE
Pt states he was a little upset about not being discharged until Monday but thought it through and calmed down.  Pt has a full range affect and is pleasant and cooperative with writer. Pt is med compliant.  Pt denies any SI/SIB, denies depression and anxiety.  Pt states his antidepressant works well for him and feels his depression is managed.  MSSA score was 2.  Pt on phenobarb taper.

## 2021-02-18 NOTE — PROGRESS NOTES
"[facility], [place]   Psychiatric Progress Note        Interim history   This is a 35 year old male with  Benzodiazepine use disorder severe  Benzodiazepine withdrawal  Alcohol use disorder severe in early full remission  Schizoaffective disorder bipolar typePt seen in rounds.   The patient's care was discussed with the treatment team during the daily team meeting and/or staff's chart notes were reviewed.  Staff report patient has been visible in the milieu,  no acute eventsovernight.   Patient reports his mood is good his energy is good      Sleep:No concerns, sleeps well through night  Appetite:fair motivation interest good  Denied suicidal/homicidal ideation/plan intent.denied psychosis  No prior suicde attempts  No access to gun  Pt is in alcohol withdrawal still being monitered every 4 hrs for it,   Pt mssa score are monitered  Tolerating meds and has no side effects.              Medications:     Current Facility-Administered Medications   Medication     acetaminophen (TYLENOL) tablet 650 mg     alum & mag hydroxide-simethicone (MAALOX) suspension 30 mL     ARIPiprazole (ABILIFY) tablet 20 mg     hydrOXYzine (ATARAX) tablet 25 mg     ibuprofen (ADVIL/MOTRIN) tablet 600 mg     nicotine polacrilex (NICORETTE) gum 4 mg     OLANZapine (zyPREXA) tablet 10 mg    Or     OLANZapine (zyPREXA) injection 10 mg     ondansetron (ZOFRAN-ODT) ODT tab 4 mg     PHENobarbital (LUMINAL) tablet 64.8 mg     propranolol (INDERAL) tablet 10 mg     QUEtiapine (SEROquel) tablet 25 mg     senna-docusate (SENOKOT-S/PERICOLACE) 8.6-50 MG per tablet 1 tablet     traZODone (DESYREL) tablet 50 mg     vilazodone (VIIBRYD) tablet 40 mg             Allergies:     Allergies   Allergen Reactions     Penicillins Swelling            Psychiatric Examination:   Blood pressure (!) 135/91, pulse 79, temperature 97.8  F (36.6  C), temperature source Temporal, resp. rate 16, height 1.727 m (5' 8\"), weight 119.7 kg (264 lb), " SpO2 96 %.  Weight is 264 lbs 0 oz  Body mass index is 40.14 kg/m .    Appearance:  awake, alert and adequately groomed  Attitude:  cooperative  Eye Contact:  good  Mood:  better  Affect:  appropriate and in normal range and mood congruent  Speech:  clear, coherent rate /rhythm are normal in rate rhythm volume  Psychomotor Behavior:  no evidence of tardive dyskinesia, dystonia, or tics and intact station, gait and muscle tone  Throught Process:  logical  Associations:  no loose associations  Thought Content:  no evidence of suicidal ideation or homicidal ideation, no evidence of psychotic thought, no auditory hallucinations present and no visual hallucinations present  Insight:  fair  Judgement:  intact  Oriented to:  time, person, and place  Attention Span and Concentration:  intact  Recent and Remote Memory:  intact  Language fund of knowledge are adequate         Labs:     No results found for: NTBNPI, NTBNP  Lab Results   Component Value Date    WBC 8.1 02/16/2021    HGB 14.2 02/16/2021    HCT 42.6 02/16/2021    MCV 90 02/16/2021     02/16/2021     Lab Results   Component Value Date    TSH 2.70 02/17/2021         DX    Benzodiazepine use disorder severe  Benzodiazepine withdrawal  Alcohol use disorder severe in early full remission  Schizoaffective disorder bipolar type   PLAN  Continue phenobarbital 60 mg 4 times a day patient tolerating with no side effects today's date to start the taper on day 3    MSSA    Eating Disturbances: ate and enjoyed all of it or not applicable  Tremor: 0 - no tremor  Sleep Disturbance: slept through the night or not applicable  Clouding of Sensorium: no evidence  Hallucinations: 0 - none  Quality of Contact: 0 - awareness of examiner and people around him/her  Agitation: 0 - normal activity  Paroxysmal Sweats: 0 - no observed sweating  Temperature: 99.5 or below  Pulse: 1 - 70 to 79  Total MSSA Score: 2    Laboratory/Imaging: reviewed with patient   Consults: internal  medicine consult reviewed  Patient will be treated in therapeutic milieu with appropriate individual and group therapies as described.  PDMP CHECKED     Supportive psychotheraoy provided, shakila talked about recovery enviroment, relapse prevention, triggers to use.  Discussed with patient many issues of addiction,triggers, relapse, and establishing a solid recovery program.  Asked pt to be med complinat   Medical diagnoses to be addressed this admission:    Plan:  LABORATORY DATA:  Urine drug screen positive for cannabinoids and benzodiazepines.  Total triglycerides 216, non-, .  Otherwise, CBC, comprehensive metabolic panel, rest of lipid panel and TSH normal.  COVID testing negative.  Breathalyzer negative.      IMPRESSION:   1.  Benzodiazepine abuse and withdrawal per Dr. Hung.   2.  Mild dyslipidemia on admission labs, likely secondary to diet.   3.  Otherwise, overall apparent normally good physical health.      PLAN:  No medical intervention indicated at this time.  He was encouraged to eat a low-fat diet and he should follow-up with his family physician after discharge in 3-6 months for repeat fasting lipid panel.  No further medical recommendations at this time.  The patient is medically stable.  Medicine is signing off.  Please feel free to call with questions.      Thank you for this consultation.     Legal Status: voluntary    Safety Assessment:   Checks:  15 min  Precautions: withdrawal precautions  Pt has not required locked seclusion or restraints in the past 24 hours to maintain safety, please refer to RN documentation for further details.  Discussed with patient many issues of addiction,triggers, relapse, and establishing a solid recovery program.  Able to give informed consent:  YES   Discussed Risks/Benefits/Side Effects/Alternatives: YES    After discussion of the indications, risks, benefits, alternatives and consequences of no treatment, the patient elects to complete detox and go  to treatment at [facility]

## 2021-02-19 PROCEDURE — 128N000004 HC R&B CD ADULT

## 2021-02-19 PROCEDURE — 250N000013 HC RX MED GY IP 250 OP 250 PS 637: Performed by: PSYCHIATRY & NEUROLOGY

## 2021-02-19 PROCEDURE — 99233 SBSQ HOSP IP/OBS HIGH 50: CPT | Performed by: PSYCHIATRY & NEUROLOGY

## 2021-02-19 RX ORDER — ARIPIPRAZOLE 20 MG/1
20 TABLET ORAL AT BEDTIME
Qty: 30 TABLET | Refills: 0 | Status: SHIPPED | OUTPATIENT
Start: 2021-02-19 | End: 2022-01-07

## 2021-02-19 RX ORDER — PHENOBARBITAL 32.4 MG/1
32.4 TABLET ORAL EVERY MORNING
Status: COMPLETED | OUTPATIENT
Start: 2021-02-21 | End: 2021-02-21

## 2021-02-19 RX ORDER — PHENOBARBITAL 64.8 MG/1
64.8 TABLET ORAL EVERY MORNING
Status: COMPLETED | OUTPATIENT
Start: 2021-02-20 | End: 2021-02-20

## 2021-02-19 RX ORDER — PHENOBARBITAL 32.4 MG/1
32.4 TABLET ORAL AT BEDTIME
Qty: 6 TABLET | Refills: 0 | Status: SHIPPED | OUTPATIENT
Start: 2021-02-22 | End: 2021-02-19

## 2021-02-19 RX ORDER — PROPRANOLOL HYDROCHLORIDE 10 MG/1
10 TABLET ORAL 2 TIMES DAILY PRN
Qty: 30 TABLET | Refills: 0 | Status: SHIPPED | OUTPATIENT
Start: 2021-02-19 | End: 2022-01-07

## 2021-02-19 RX ORDER — PHENOBARBITAL 64.8 MG/1
64.8 TABLET ORAL 2 TIMES DAILY
Status: COMPLETED | OUTPATIENT
Start: 2021-02-19 | End: 2021-02-19

## 2021-02-19 RX ORDER — VILAZODONE HYDROCHLORIDE 40 MG/1
40 TABLET ORAL DAILY
Qty: 30 TABLET | Refills: 0 | Status: SHIPPED | OUTPATIENT
Start: 2021-02-19 | End: 2022-04-17

## 2021-02-19 RX ORDER — PHENOBARBITAL 64.8 MG/1
64.8 TABLET ORAL ONCE
Status: COMPLETED | OUTPATIENT
Start: 2021-02-19 | End: 2021-02-19

## 2021-02-19 RX ORDER — POLYETHYLENE GLYCOL 3350 17 G
2 POWDER IN PACKET (EA) ORAL
Status: DISCONTINUED | OUTPATIENT
Start: 2021-02-19 | End: 2021-02-19

## 2021-02-19 RX ORDER — QUETIAPINE FUMARATE 25 MG/1
25 TABLET, FILM COATED ORAL DAILY PRN
Qty: 30 TABLET | Refills: 0 | Status: SHIPPED | OUTPATIENT
Start: 2021-02-19 | End: 2021-02-22

## 2021-02-19 RX ORDER — PHENOBARBITAL 32.4 MG/1
32.4 TABLET ORAL AT BEDTIME
Qty: 6 TABLET | Refills: 0 | Status: SHIPPED | OUTPATIENT
Start: 2021-02-22 | End: 2022-04-18

## 2021-02-19 RX ORDER — HYDROXYZINE HYDROCHLORIDE 25 MG/1
25 TABLET, FILM COATED ORAL EVERY 4 HOURS PRN
Qty: 30 TABLET | Refills: 0 | Status: SHIPPED | OUTPATIENT
Start: 2021-02-19 | End: 2022-04-17

## 2021-02-19 RX ORDER — TRAZODONE HYDROCHLORIDE 50 MG/1
50 TABLET, FILM COATED ORAL AT BEDTIME
Qty: 30 TABLET | Refills: 0 | Status: SHIPPED | OUTPATIENT
Start: 2021-02-19 | End: 2022-01-07

## 2021-02-19 RX ORDER — POLYETHYLENE GLYCOL 3350 17 G
4 POWDER IN PACKET (EA) ORAL
Status: DISCONTINUED | OUTPATIENT
Start: 2021-02-19 | End: 2021-02-22 | Stop reason: HOSPADM

## 2021-02-19 RX ORDER — ARIPIPRAZOLE 2 MG/1
2 TABLET ORAL 2 TIMES DAILY PRN
Qty: 30 TABLET | Refills: 1 | Status: SHIPPED | OUTPATIENT
Start: 2021-02-19 | End: 2022-01-07

## 2021-02-19 RX ADMIN — NICOTINE POLACRILEX 4 MG: 2 LOZENGE ORAL at 20:50

## 2021-02-19 RX ADMIN — IBUPROFEN 600 MG: 600 TABLET ORAL at 08:02

## 2021-02-19 RX ADMIN — NICOTINE POLACRILEX 4 MG: 2 LOZENGE ORAL at 15:01

## 2021-02-19 RX ADMIN — PHENOBARBITAL 64.8 MG: 64.8 TABLET ORAL at 15:00

## 2021-02-19 RX ADMIN — NICOTINE POLACRILEX 4 MG: 2 LOZENGE ORAL at 16:10

## 2021-02-19 RX ADMIN — ARIPIPRAZOLE 20 MG: 10 TABLET ORAL at 20:48

## 2021-02-19 RX ADMIN — PHENOBARBITAL 64.8 MG: 64.8 TABLET ORAL at 08:43

## 2021-02-19 RX ADMIN — NICOTINE POLACRILEX 4 MG: 4 GUM, CHEWING BUCCAL at 08:02

## 2021-02-19 RX ADMIN — NICOTINE POLACRILEX 4 MG: 2 LOZENGE ORAL at 10:52

## 2021-02-19 RX ADMIN — VILAZODONE HYDROCHLORIDE 40 MG: 40 TABLET ORAL at 08:02

## 2021-02-19 RX ADMIN — PHENOBARBITAL 64.8 MG: 64.8 TABLET ORAL at 20:48

## 2021-02-19 RX ADMIN — NICOTINE POLACRILEX 4 MG: 2 LOZENGE ORAL at 18:38

## 2021-02-19 RX ADMIN — TRAZODONE HYDROCHLORIDE 50 MG: 50 TABLET ORAL at 20:48

## 2021-02-19 RX ADMIN — ACETAMINOPHEN 650 MG: 325 TABLET, FILM COATED ORAL at 10:52

## 2021-02-19 ASSESSMENT — ACTIVITIES OF DAILY LIVING (ADL)
ORAL_HYGIENE: INDEPENDENT
DRESS: INDEPENDENT
LAUNDRY: WITH SUPERVISION
DRESS: STREET CLOTHES
HYGIENE/GROOMING: INDEPENDENT
HYGIENE/GROOMING: INDEPENDENT
ORAL_HYGIENE: INDEPENDENT

## 2021-02-19 NOTE — PROGRESS NOTES
[facility], [place]   Psychiatric Progress Note        Interim history   This is a 35 year old male with  Benzodiazepine use disorder severe  Benzodiazepine withdrawal  Alcohol use disorder severe in early full remission  Schizoaffective disorder bipolar typePt seen in rounds.   The patient's care was discussed with the treatment team during the daily team meeting and/or staff's chart notes were reviewed.  Staff report patient has been visible in the milieu,  no acute eventsovernight.   Patient reports his mood is good his energy is good      Sleep:No concerns, sleeps well through night  Appetite:fair motivation interest good  Denied suicidal/homicidal ideation/plan intent.denied psychosis  No prior suicde attempts  No access to gun  Pt is in alcohol withdrawal still being monitered every 4 hrs for it,   Pt mssa score are monitered  Tolerating meds and has no side effects.              Medications:     Current Facility-Administered Medications   Medication     acetaminophen (TYLENOL) tablet 650 mg     alum & mag hydroxide-simethicone (MAALOX) suspension 30 mL     ARIPiprazole (ABILIFY) tablet 20 mg     hydrOXYzine (ATARAX) tablet 25 mg     ibuprofen (ADVIL/MOTRIN) tablet 600 mg     nicotine (COMMIT) lozenge 2 mg     nicotine polacrilex (NICORETTE) gum 4 mg     OLANZapine (zyPREXA) tablet 10 mg    Or     OLANZapine (zyPREXA) injection 10 mg     ondansetron (ZOFRAN-ODT) ODT tab 4 mg     [START ON 2/21/2021] PHENobarbital (LUMINAL) tablet 32.4 mg     PHENobarbital (LUMINAL) tablet 64.8 mg     [START ON 2/20/2021] PHENobarbital (LUMINAL) tablet 64.8 mg     [START ON 2/20/2021] PHENobarbital (LUMINAL) tablet 97.2 mg     propranolol (INDERAL) tablet 10 mg     QUEtiapine (SEROquel) tablet 25 mg     senna-docusate (SENOKOT-S/PERICOLACE) 8.6-50 MG per tablet 1 tablet     traZODone (DESYREL) tablet 50 mg     vilazodone (VIIBRYD) tablet 40 mg             Allergies:     Allergies   Allergen  "Reactions     Penicillins Swelling            Psychiatric Examination:   Blood pressure (!) 136/90, pulse 85, temperature 97.6  F (36.4  C), temperature source Temporal, resp. rate 16, height 1.727 m (5' 8\"), weight 119.7 kg (264 lb), SpO2 97 %.  Weight is 264 lbs 0 oz  Body mass index is 40.14 kg/m .    Appearance:  awake, alert and adequately groomed  Attitude:  cooperative  Eye Contact:  good  Mood:  better  Affect:  appropriate and in normal range and mood congruent  Speech:  clear, coherent rate /rhythm are normal in rate rhythm volume  Psychomotor Behavior:  no evidence of tardive dyskinesia, dystonia, or tics and intact station, gait and muscle tone  Throught Process:  logical  Associations:  no loose associations  Thought Content:  no evidence of suicidal ideation or homicidal ideation, no evidence of psychotic thought, no auditory hallucinations present and no visual hallucinations present  Insight:  fair  Judgement:  intact  Oriented to:  time, person, and place  Attention Span and Concentration:  intact  Recent and Remote Memory:  intact  Language fund of knowledge are adequate         Labs:     No results found for: NTBNPI, NTBNP  Lab Results   Component Value Date    WBC 8.1 02/16/2021    HGB 14.2 02/16/2021    HCT 42.6 02/16/2021    MCV 90 02/16/2021     02/16/2021     Lab Results   Component Value Date    TSH 2.70 02/17/2021         DX    Benzodiazepine use disorder severe  Benzodiazepine withdrawal  Alcohol use disorder severe in early full remission  Schizoaffective disorder bipolar type   PLAN  Will start taper today    Will write taper for weekend     We will order Abilify 2 mg twice a day as needed for psychotic anxiety  Patient had recent increased anxiety and command auditory hallucinations and overdosed on medications  MSSA    Eating Disturbances: 2  Tremor: 0 - no tremor  Sleep Disturbance: slept through the night or not applicable  Clouding of Sensorium: no evidence  Hallucinations: 0 " - none  Quality of Contact: 0 - awareness of examiner and people around him/her  Agitation: 0 - normal activity  Paroxysmal Sweats: 0 - no observed sweating  Temperature: 99.5 or below  Pulse: 2 - 80 to 89  Total MSSA Score: 5  Schizoaffective disorder bipolar type  Will continue abilfy     Laboratory/Imaging: reviewed with patient   Consults: internal medicine consult reviewed  Patient will be treated in therapeutic milieu with appropriate individual and group therapies as described.  PDMP CHECKED     Supportive psychotheraoy provided, shakila talked about recovery enviroment, relapse prevention, triggers to use.  Discussed with patient many issues of addiction,triggers, relapse, and establishing a solid recovery program.  Asked pt to be med complinat   Medical diagnoses to be addressed this admission:    Plan:  LABORATORY DATA:  Urine drug screen positive for cannabinoids and benzodiazepines.  Total triglycerides 216, non-, .  Otherwise, CBC, comprehensive metabolic panel, rest of lipid panel and TSH normal.  COVID testing negative.  Breathalyzer negative.      IMPRESSION:   1.  Benzodiazepine abuse and withdrawal per Dr. Hung.   2.  Mild dyslipidemia on admission labs, likely secondary to diet.   3.  Otherwise, overall apparent normally good physical health.      PLAN:  No medical intervention indicated at this time.  He was encouraged to eat a low-fat diet and he should follow-up with his family physician after discharge in 3-6 months for repeat fasting lipid panel.  No further medical recommendations at this time.  The patient is medically stable.  Medicine is signing off.  Please feel free to call with questions.      Thank you for this consultation.     Legal Status: voluntary    Safety Assessment:   Checks:  15 min  Precautions: withdrawal precautions  Pt has not required locked seclusion or restraints in the past 24 hours to maintain safety, please refer to RN documentation for further  details.  Discussed with patient many issues of addiction,triggers, relapse, and establishing a solid recovery program.  Able to give informed consent:  YES   Discussed Risks/Benefits/Side Effects/Alternatives: YES    After discussion of the indications, risks, benefits, alternatives and consequences of no treatment, the patient elects to complete detox and go to treatment at [facility]

## 2021-02-19 NOTE — PLAN OF CARE
Pt MSSA scores today are 5 and 5, 64.8mg phenobarbital administered x 2. Pt denies suicidal ideation plans or intent. Denies anxiety and depression. Endorses feeling happiness and hopefulness. Will continue to monitor and intervene as warranted.

## 2021-02-19 NOTE — PLAN OF CARE
"Pt assessed once on MSSA for benzo use, score of 3. Pt received scheduled phenobarb.    Pt denied SI. Stated he has been on the phone talking to friends and family for most of the shift. Stated he was feeling \"not bad\"    No other concerns noted or stated this shift  "

## 2021-02-19 NOTE — PROVIDER NOTIFICATION
Update Dr. Hung that pt no longer has any active phenobarbital orders. Obtained 1 time dose order for phenobarbital.

## 2021-02-20 PROCEDURE — 128N000004 HC R&B CD ADULT

## 2021-02-20 PROCEDURE — 250N000013 HC RX MED GY IP 250 OP 250 PS 637: Performed by: PSYCHIATRY & NEUROLOGY

## 2021-02-20 PROCEDURE — H2035 A/D TX PROGRAM, PER HOUR: HCPCS | Mod: HQ

## 2021-02-20 RX ORDER — QUETIAPINE FUMARATE 25 MG/1
25 TABLET, FILM COATED ORAL DAILY PRN
Status: DISCONTINUED | OUTPATIENT
Start: 2021-02-20 | End: 2021-02-22 | Stop reason: HOSPADM

## 2021-02-20 RX ADMIN — NICOTINE POLACRILEX 4 MG: 2 LOZENGE ORAL at 09:55

## 2021-02-20 RX ADMIN — NICOTINE POLACRILEX 4 MG: 2 LOZENGE ORAL at 16:21

## 2021-02-20 RX ADMIN — Medication 2 MG: at 14:12

## 2021-02-20 RX ADMIN — TRAZODONE HYDROCHLORIDE 50 MG: 50 TABLET ORAL at 22:03

## 2021-02-20 RX ADMIN — NICOTINE POLACRILEX 4 MG: 2 LOZENGE ORAL at 07:54

## 2021-02-20 RX ADMIN — ARIPIPRAZOLE 20 MG: 10 TABLET ORAL at 20:05

## 2021-02-20 RX ADMIN — NICOTINE POLACRILEX 4 MG: 2 LOZENGE ORAL at 14:09

## 2021-02-20 RX ADMIN — PHENOBARBITAL 64.8 MG: 64.8 TABLET ORAL at 07:53

## 2021-02-20 RX ADMIN — PROPRANOLOL HYDROCHLORIDE 10 MG: 10 TABLET ORAL at 16:22

## 2021-02-20 RX ADMIN — ACETAMINOPHEN 650 MG: 325 TABLET, FILM COATED ORAL at 06:13

## 2021-02-20 RX ADMIN — PHENOBARBITAL 97.2 MG: 64.8 TABLET ORAL at 20:06

## 2021-02-20 RX ADMIN — NICOTINE POLACRILEX 4 MG: 2 LOZENGE ORAL at 20:06

## 2021-02-20 RX ADMIN — VILAZODONE HYDROCHLORIDE 40 MG: 40 TABLET ORAL at 07:54

## 2021-02-20 RX ADMIN — PROPRANOLOL HYDROCHLORIDE 10 MG: 10 TABLET ORAL at 09:55

## 2021-02-20 ASSESSMENT — ACTIVITIES OF DAILY LIVING (ADL)
LAUNDRY: WITH SUPERVISION
HYGIENE/GROOMING: INDEPENDENT
DRESS: STREET CLOTHES
ORAL_HYGIENE: INDEPENDENT
ORAL_HYGIENE: INDEPENDENT
DRESS: STREET CLOTHES
HYGIENE/GROOMING: INDEPENDENT

## 2021-02-20 NOTE — PLAN OF CARE
Pt was out in the lounge interacting with peer and staff. He denied any mental health symptoms. Pt MSSA score of 6 and 6 given scheduled Phenobarbital. Pt showered this evening. He told this nurse that he is serious about staying sober and doesn't want to continue to hurt himself. Coping skills were discussed and he appeared calmer. Pt denied any AH/VH during shift.  Will continue to monitor.

## 2021-02-20 NOTE — PROGRESS NOTES
Pt has discharge medications that are too soon to fill however he has brought all of them to the hospital with exception to trazodone. He reports no way to get the trazodone therefore an insurance override was attempted through discharge pharmacy. The discharge pharmacy attempted to complete this but it was denied. Discharge Pharmacy states they can call his plan  Monday morning to hopefully obtain an override to fill the trazodone. Recommend day RN call the discharge pharmacy Monday at 0800 to get this underway as patient is slate for pickup from [facility] for treatment Monday at 1300.

## 2021-02-20 NOTE — PROGRESS NOTES
Pt requests and recieves 650 of Tylenol for a headache@0615, otherwise Pt is observed to sleep throughout the noc.

## 2021-02-20 NOTE — PLAN OF CARE
"Pt denies depression. Denies suicidal ideation plans or intent. Endorses \"some\" anxiety but relates it to \"I'm anxious to get out\". Pt understands he'll be going to [facility] on Monday. He asked question about the phenobarbital taper and I answered all his questions \"so I'll be going to [facility] on a taper\". Informed pt he will be still tapering for a few days at [facility] and then it will end. Informed him the taper is set in a way so that he should not feel symptoms of withdrawal coming off phenobarbital. Pt states understanding of this information. MSSA score today is a 5, phenobarbital administered as ordered. Will continue to monitor and intervene as warranted.  "

## 2021-02-21 PROCEDURE — H2032 ACTIVITY THERAPY, PER 15 MIN: HCPCS

## 2021-02-21 PROCEDURE — 250N000013 HC RX MED GY IP 250 OP 250 PS 637: Performed by: PHYSICIAN ASSISTANT

## 2021-02-21 PROCEDURE — 128N000004 HC R&B CD ADULT

## 2021-02-21 PROCEDURE — 250N000013 HC RX MED GY IP 250 OP 250 PS 637: Performed by: PSYCHIATRY & NEUROLOGY

## 2021-02-21 RX ORDER — CLONIDINE HYDROCHLORIDE 0.1 MG/1
0.1 TABLET ORAL 2 TIMES DAILY PRN
Status: DISCONTINUED | OUTPATIENT
Start: 2021-02-21 | End: 2021-02-22 | Stop reason: HOSPADM

## 2021-02-21 RX ORDER — QUETIAPINE FUMARATE 25 MG/1
25 TABLET, FILM COATED ORAL DAILY PRN
Qty: 30 TABLET | Refills: 0 | Status: SHIPPED | OUTPATIENT
Start: 2021-02-21 | End: 2022-01-07

## 2021-02-21 RX ADMIN — PHENOBARBITAL 32.4 MG: 32.4 TABLET ORAL at 08:07

## 2021-02-21 RX ADMIN — VILAZODONE HYDROCHLORIDE 40 MG: 40 TABLET ORAL at 08:07

## 2021-02-21 RX ADMIN — PHENOBARBITAL 97.2 MG: 64.8 TABLET ORAL at 20:09

## 2021-02-21 RX ADMIN — PROPRANOLOL HYDROCHLORIDE 10 MG: 10 TABLET ORAL at 08:07

## 2021-02-21 RX ADMIN — CLONIDINE HYDROCHLORIDE 0.1 MG: 0.1 TABLET ORAL at 12:32

## 2021-02-21 RX ADMIN — NICOTINE POLACRILEX 4 MG: 2 LOZENGE ORAL at 20:12

## 2021-02-21 RX ADMIN — TRAZODONE HYDROCHLORIDE 50 MG: 50 TABLET ORAL at 21:45

## 2021-02-21 RX ADMIN — IBUPROFEN 600 MG: 600 TABLET ORAL at 08:07

## 2021-02-21 RX ADMIN — CLONIDINE HYDROCHLORIDE 0.1 MG: 0.1 TABLET ORAL at 22:31

## 2021-02-21 RX ADMIN — ARIPIPRAZOLE 20 MG: 10 TABLET ORAL at 20:11

## 2021-02-21 RX ADMIN — NICOTINE POLACRILEX 4 MG: 2 LOZENGE ORAL at 16:48

## 2021-02-21 RX ADMIN — NICOTINE POLACRILEX 4 MG: 2 LOZENGE ORAL at 12:32

## 2021-02-21 RX ADMIN — PROPRANOLOL HYDROCHLORIDE 10 MG: 10 TABLET ORAL at 16:48

## 2021-02-21 RX ADMIN — NICOTINE POLACRILEX 2 MG: 2 LOZENGE ORAL at 21:45

## 2021-02-21 RX ADMIN — NICOTINE POLACRILEX 4 MG: 2 LOZENGE ORAL at 08:07

## 2021-02-21 RX ADMIN — IBUPROFEN 600 MG: 600 TABLET ORAL at 20:13

## 2021-02-21 ASSESSMENT — ACTIVITIES OF DAILY LIVING (ADL)
DRESS: STREET CLOTHES
ORAL_HYGIENE: INDEPENDENT
HYGIENE/GROOMING: INDEPENDENT
HYGIENE/GROOMING: INDEPENDENT
LAUNDRY: WITH SUPERVISION
LAUNDRY: WITH SUPERVISION
ORAL_HYGIENE: INDEPENDENT
DRESS: INDEPENDENT

## 2021-02-21 NOTE — PROGRESS NOTES
02/20/21 2200   Groups   Details    (Psychotherapy)   Number of patients attending the group:  5  Group Length:  1 Hours     Group Therapy Type: Psychotherapy-Animal wisdom- strengths and self esteem     Summary of Group / Topics Discussed:      The  Psychotherapy group goal is to promote insight to positive choice and change. Group processing is within a supportive and safe environment. Patients will process emotions using verbal group and expressive psychotherapy interventions including visual art/writing interventions.     Group interventions support patients by: identifying strengths     Modalities to reach these goals include:Strength based positive psychology and creative expression      Subjective -alright day a little long, wants to work on co-dependence he reports     Objective/ Intervention- Goal of group and Therapeutic modality utilized- animal connections/ strength based activity     Group Response- very engaged     Patient Response- Pt was pleasant, cooperative, social and engaged. He chose a eagle as his animal connection. He saw an eagle outside of his room window. He lorna the river, bridge and [employer] buildings with the eagle in flight. He seemed to be proud of his painting. He believes the eagle to be independent and powerful.    Viktor Ortiz, ALONSOFT, ATR-BC

## 2021-02-21 NOTE — PROGRESS NOTES
Brief Medicine Note    Contacted by nursing regarding elevated blood pressure of 151/113. No hx of HTN. Please see initial consult note from 2/17/2021. Patient asymptomatic. Had received propanolol earlier today. Blood pressures ranged 130-140s this admission. Currently getting tapered off of phenobarbital, which could be contributing. Will add clonidine 0.1 mg BID PRN for SBP >170 or DBP >110 as likely elevated in the setting of tapering phenobarb. Recommend patient follow up with PCP to continue monitoring blood pressure after discharge.     Medicine will sign off. No further recommendations at this time. Please page the on-call [facility]ILLIAN for any intercurrent medical issues which arise.     Rosy Negron PA-C  Hospitalist Service  Contact information available via [facility] Paging/Directory

## 2021-02-21 NOTE — PLAN OF CARE
"Pt MSSA score today is a 2, administered phenobarbital as scheduled. Pt reports wanting something for his blood pressure this morning, prn propanolol administered. /103. Pt denies any associated symptoms with the HTN. He is calm and pleasant with all interactions. He endorses feeling happiness and hopefulness. Reports he was working on his Step 4 last night (of the 12 steps of AA). Talked with him for a while on the importance of doing a personal inventory but asked him if he thought it might be better to wait and see if [facility] can offer him some help. He states he prefers to work on it now. Will continue to monitor and intervene as warranted.      BP (!) 144/103   Pulse 79   Temp 97.9  F (36.6  C) (Temporal)   Resp 16   Ht 1.727 m (5' 8\")   Wt 119.7 kg (264 lb)   SpO2 97%   BMI 40.14 kg/m      "

## 2021-02-21 NOTE — PLAN OF CARE
Pt was visible in the milieu. He had one incident of emesis after dinner. Pt states that he had too much tea. Declined any PRNs. Pt MSSA scores of 3 and 2 given scheduled Phenobarbital. Pt denies SI/HI/SIB.

## 2021-02-21 NOTE — PROVIDER NOTIFICATION
Updated Rosy with internal medicine about pt blood pressure, she is ordering prn clonidine. Attempted to control bp this morning with prn inderal however pt BP actually increased from breakfast to lunch check 144/103 increase to 151/113. Will administer prn clonidine and monitor response.    ADDENDUM: Blood pressure after clonidine administration 130/89.      Vitals:    02/20/21 2001 02/21/21 0741 02/21/21 1124 02/21/21 1437   BP: 130/89 (!) 144/103 (!) 151/113 130/89   BP Location:       Pulse: 76 79 75 70   Resp: 16 16 16 16   Temp: 98.4  F (36.9  C) 97.9  F (36.6  C) 97.2  F (36.2  C)    TempSrc: Temporal Temporal Temporal    SpO2:       Weight:       Height:

## 2021-02-22 VITALS
HEART RATE: 69 BPM | BODY MASS INDEX: 40.01 KG/M2 | WEIGHT: 264 LBS | SYSTOLIC BLOOD PRESSURE: 142 MMHG | TEMPERATURE: 97.5 F | DIASTOLIC BLOOD PRESSURE: 97 MMHG | HEIGHT: 68 IN | RESPIRATION RATE: 16 BRPM | OXYGEN SATURATION: 99 %

## 2021-02-22 PROCEDURE — 99239 HOSP IP/OBS DSCHRG MGMT >30: CPT | Performed by: PSYCHIATRY & NEUROLOGY

## 2021-02-22 PROCEDURE — 250N000013 HC RX MED GY IP 250 OP 250 PS 637: Performed by: PSYCHIATRY & NEUROLOGY

## 2021-02-22 RX ADMIN — VILAZODONE HYDROCHLORIDE 40 MG: 40 TABLET ORAL at 08:32

## 2021-02-22 RX ADMIN — IBUPROFEN 600 MG: 600 TABLET ORAL at 08:31

## 2021-02-22 RX ADMIN — VILAZODONE HYDROCHLORIDE 40 MG: 40 TABLET ORAL at 08:28

## 2021-02-22 RX ADMIN — NICOTINE POLACRILEX 4 MG: 2 LOZENGE ORAL at 08:34

## 2021-02-22 RX ADMIN — PROPRANOLOL HYDROCHLORIDE 10 MG: 10 TABLET ORAL at 08:31

## 2021-02-22 ASSESSMENT — ACTIVITIES OF DAILY LIVING (ADL)
ORAL_HYGIENE: INDEPENDENT
HYGIENE/GROOMING: INDEPENDENT
LAUNDRY: WITH SUPERVISION
DRESS: INDEPENDENT

## 2021-02-22 NOTE — PLAN OF CARE
"Problem: Substance Withdrawal  Goal: Substance Withdrawal  Description: Signs and symptoms of listed problems will be absent or manageable.  Outcome: Adequate for Discharge     Problem: Adult Inpatient Plan of Care  Goal: Optimal Comfort and Wellbeing  Intervention: Provide Person-Centered Care  Recent Flowsheet Documentation  Taken 2/21/2021 1901 by Andrew Galindo, RN  Trust Relationship/Rapport:    emotional support provided    thoughts/feelings acknowledged    Shift Summary:   Pt remains OOD per protocol and RN assessment. Pt social with peers and actively participated in groups. A&O x4. Full-range affect. Up independently. Pt states he feels \"good\" and is proud of his accomplishments during inpatient stay. Pt expressed insight into his relapse and hopefulness. Pt was very appreciative of the staff on the unit and his experience at [facility]. Pt endorses anxiety. PRN Propranolol 10 mg given- pt request. Pt reported feeling calm and low anxiety levels during RN reassessment earlier in the evening. Pt began having problems with his partner and spent 2 hours on the phone before bedtime. Pt's mood changed to tense with a flat affect.  Denies depressive symptoms. Pt reports therapeutic relief from all medications and denies side effects. Denies SI/SIB/HI/AH/VH.     Medical:  Pt has elevated blood pressure readings. Pt seen by Internal Medicine today- see note. Pt given Clonidine 0.1 mg PO at 2231. Will recheck. C/o of minimal lower back pain. PRN Ibuprofen 600 mg given at bedtime. Denies urine or bowel abnormalities. VSS. Good appetite and hygiene. PRN Trazodone 50 mg PO given at bedtime. Nursing will handoff and continue to monitor and assess.   "

## 2021-02-22 NOTE — PLAN OF CARE
Problem: General Rehab Plan of Care  Goal: Therapeutic Recreation/Music Therapy Goal  Description: The patient and/or their representative will achieve their patient-specific goals related to the plan of care.  The patient-specific goals include: emotional expression  Outcome: No Change   Art Therapy directive was to create a watercolor mandala using suggested mindfulness techniques and by creating a personal intention for the day and/or week. Goals of directive: mindfulness, emotional expression, emotional regulation, trauma containment, media exploration.  Pt was an active participant, focused on task for the full duration of group (1 hour). Pt finished painting and briefly shared with group. Pts personal intention for mandala was: calm.  Pts mood was calm, pleasant participant.

## 2021-02-22 NOTE — DISCHARGE SUMMARY
Markus Mosley MRN# [id number]   Age: 35 year old YOB: 1985     Date of Admission:  2/16/2021  Date of Discharge:  2/22/2021  Admitting Physician:  Addison Hung MD  Discharge Physician:  Addison Hung MD      DISCHARGE  DX     Benzodiazepine use disorder severe    Alcohol use disorder severe in early full remission  Schizoaffective disorder bipolar type         Event Leading to Hospitalization:     See Admission note by admitting provider for patient encounter. for additional details.          Hospital Course:   PATIENT was admitted to [facility] attending  under DR hung, please review the detailed admit note on 2/17/2021   The patient was placed under status 15 (15 minute checks) to ensure patient safety.   Patient was detox of benzodiazepines using phenobarbital he started the taper and was tolerating the taper  Was continued on his Abilify 20 mgvilazodone 40 mg wellAbilify 5 mg twice a day added as as needed    All outpatient medications were continued    PATIENTdid participate in groups and was visible in the milieu.     The patient's symptoms of benzodiazepine withdrawal improved improved.     Patients energy motivation , sleep appetite improved.  Pt completed detox . It was un eventful.      Discussed with patient medications for craving.  Spoke with patient about triggers coping skills relapse prevention.    CONSULTS DONE DURING PATIENTS HOSPITALIZATION.  Patient was seen by medicine on date 2/17/2021    This as per their medical consult    LABORATORY DATA:  Urine drug screen positive for cannabinoids and benzodiazepines.  Total triglycerides 216, non-, .  Otherwise, CBC, comprehensive metabolic panel, rest of lipid panel and TSH normal.  COVID testing negative.  Breathalyzer negative.      IMPRESSION:   1.  Benzodiazepine abuse and withdrawal per Dr. Hung.   2.  Mild dyslipidemia on admission labs, likely secondary to diet.   3.  Otherwise, overall  apparent normally good physical health.      PLAN:  No medical intervention indicated at this time.  He was encouraged to eat a low-fat diet and he should follow-up with his family physician after discharge in 3-6 months for repeat fasting lipid panel.  No further medical recommendations at this time.  The patient is medically stable.  Medicine is signing off.  Please feel free to call with questions.      Thank you for this consultation.              Pt was seen by cm  As per recommendations from cm    He is going back to treatment at [facility]          Labs:reviewed with patient     No results found for this or any previous visit (from the past 48 hour(s)).      Recent Results (from the past 240 hour(s))   CBC with platelets differential    Collection Time: 02/16/21  1:06 PM   Result Value Ref Range    WBC 8.1 4.0 - 11.0 10e9/L    RBC Count 4.76 4.4 - 5.9 10e12/L    Hemoglobin 14.2 13.3 - 17.7 g/dL    Hematocrit 42.6 40.0 - 53.0 %    MCV 90 78 - 100 fl    MCH 29.8 26.5 - 33.0 pg    MCHC 33.3 31.5 - 36.5 g/dL    RDW 12.6 10.0 - 15.0 %    Platelet Count 268 150 - 450 10e9/L    Diff Method Automated Method     % Neutrophils 63.7 %    % Lymphocytes 25.2 %    % Monocytes 6.3 %    % Eosinophils 3.1 %    % Basophils 1.1 %    % Immature Granulocytes 0.6 %    Nucleated RBCs 0 0 /100    Absolute Neutrophil 5.2 1.6 - 8.3 10e9/L    Absolute Lymphocytes 2.0 0.8 - 5.3 10e9/L    Absolute Monocytes 0.5 0.0 - 1.3 10e9/L    Absolute Eosinophils 0.3 0.0 - 0.7 10e9/L    Absolute Basophils 0.1 0.0 - 0.2 10e9/L    Abs Immature Granulocytes 0.1 0 - 0.4 10e9/L    Absolute Nucleated RBC 0.0    Comprehensive metabolic panel    Collection Time: 02/16/21  1:06 PM   Result Value Ref Range    Sodium 141 133 - 144 mmol/L    Potassium 4.0 3.4 - 5.3 mmol/L    Chloride 107 94 - 109 mmol/L    Carbon Dioxide 28 20 - 32 mmol/L    Anion Gap 6 3 - 14 mmol/L    Glucose 97 70 - 99 mg/dL    Urea Nitrogen 12 7 - 30 mg/dL    Creatinine 0.85 0.66 - 1.25 mg/dL     GFR Estimate >90 >60 mL/min/[1.73_m2]    GFR Estimate If Black >90 >60 mL/min/[1.73_m2]    Calcium 9.3 8.5 - 10.1 mg/dL    Bilirubin Total 0.3 0.2 - 1.3 mg/dL    Albumin 4.0 3.4 - 5.0 g/dL    Protein Total 7.4 6.8 - 8.8 g/dL    Alkaline Phosphatase 53 40 - 150 U/L    ALT 35 0 - 70 U/L    AST 18 0 - 45 U/L   Asymptomatic SARS-CoV-2 COVID-19 Virus (Coronavirus) by PCR    Collection Time: 02/16/21  1:07 PM    Specimen: Nasopharyngeal   Result Value Ref Range    SARS-CoV-2 Virus Specimen Source Nasopharyngeal     SARS-CoV-2 PCR Result NEGATIVE     SARS-CoV-2 PCR Comment       Testing was performed using the Xpert Xpress SARS-CoV-2 Assay on the Cepheid Gene-Xpert   Instrument Systems. Additional information about this Emergency Use Authorization (EUA)   assay can be found via the Lab Guide.     Drug abuse screen 6 urine (tox)    Collection Time: 02/16/21  1:27 PM   Result Value Ref Range    Amphetamine Qual Urine Negative NEG^Negative    Barbiturates Qual Urine Negative NEG^Negative    Benzodiazepine Qual Urine Positive (A) NEG^Negative    Cannabinoids Qual Urine Positive (A) NEG^Negative    Cocaine Qual Urine Negative NEG^Negative    Ethanol Qual Urine Negative NEG^Negative    Opiates Qualitative Urine Negative NEG^Negative   Alcohol breath test POCT    Collection Time: 02/16/21  2:01 PM   Result Value Ref Range    Alcohol Breath Test 0.00 0.00 - 0.01   TSH with free T4 reflex and/or T3 as indicated    Collection Time: 02/17/21  7:57 AM   Result Value Ref Range    TSH 2.70 0.40 - 4.00 mU/L   Lipid panel    Collection Time: 02/17/21  7:57 AM   Result Value Ref Range    Cholesterol 195 <200 mg/dL    Triglycerides 216 (H) <150 mg/dL    HDL Cholesterol 44 >39 mg/dL    LDL Cholesterol Calculated 108 (H) <100 mg/dL    Non HDL Cholesterol 151 (H) <130 mg/dL            Because this patient meets criteria for an Alcohol Use Disorder, I performed the following brief intervention on the date of this note:              1)  Expressed concern that the patient is drinking at unhealthy levels known to increase their risk of alcohol related problems              2) Gave feedback linking alcohol use and health, including personalized feedback explaining how alcohol use can interact with their medical and/or psychiatric problems, and with prescribed medications.              3) Advised patient to abstain.    PT counseled on nicotine cessation and nicotine replacement provided    Discussed with patient many issues of addiction,triggers, relapse, and establishing a solid recovery program.    DISCHARGE MENTAL STATUS EXAMINATION:  The patient is alert, oriented x3.  Good fund of knowledge.  Good use of language.  Recent and remote memory, language, fund of knowledge are all adequate.  Euthymic mood congruent affect  Speech normal rate/rhythm linear tp no loose asso,The patient does not have any active suicidal or homicidal ideation.  Does not have any auditory or visual hallucination.  Fair insight/judgment At this time, the patient was stable to be discharged.        Pt was not determined to not be a danger to himself or others. At the current time of discharge, the patient does not meet criteria for involuntary hospitalization. On the day of discharge, the patient reports that they do not have suicidal or homicidal ideation and would never hurt themselves or others. Steps taken to minimize risk include: assessing patient s behavior and thought process daily during hospital stay, discharging patient with adequate plan for follow up for mental and physical health and discussing safety plan of returning to the hospital should the patient ever have thoughts of harming themselves or others. Therefore, based on all available evidence including the factors cited above, the patient does not appear to be at imminent risk for self-harm, and is appropriate for outpatient level of care.     Educated about side effects/risk vs benefits /alternative  "including non treatment.Pt consented to be on medication.     .Total time spent on discharge summary more than 35 min  More than  20 min  planning, coordination of care, medication reconciliation and performance of physical exam on day of discharge.Care was coordinated with unit RN and unit therapist       Markus Mosley   Home Medication Instructions CHRISTINA:[id number]    Printed on:02/22/21 7617   Medication Information                      ARIPiprazole (ABILIFY) 2 MG tablet  Take 1 tablet (2 mg) by mouth 2 times daily as needed (psychosis)             ARIPiprazole (ABILIFY) 20 MG tablet  Take 1 tablet (20 mg) by mouth At Bedtime             hydrOXYzine (ATARAX) 25 MG tablet  Take 1 tablet (25 mg) by mouth every 4 hours as needed for anxiety             nicotine polacrilex (NICORETTE) 4 MG gum  Place 1 each (4 mg) inside cheek every hour as needed for other (nicotine withdrawal symptoms)             PHENobarbital (LUMINAL) 32.4 MG tablet  Take 1 tablet (32.4 mg) by mouth At Bedtime On 2/22 take 3 tab at bedtime x1 day, On 2/23 take 2 tab at bedtime x1 day, On 2/24 take 1 tab at bedtime x1 day,             propranolol (INDERAL) 10 MG tablet  Take 1 tablet (10 mg) by mouth 2 times daily as needed             QUEtiapine (SEROQUEL) 25 MG tablet  Take 1 tablet (25 mg) by mouth daily as needed             QUEtiapine (SEROQUEL) 25 MG tablet  Take 1 tablet (25 mg) by mouth daily as needed (insomnia or anxiety)             traZODone (DESYREL) 50 MG tablet  Take 1 tablet (50 mg) by mouth At Bedtime             vilazodone (VIIBRYD) 40 MG TABS tablet  Take 1 tablet (40 mg) by mouth daily             Disposition:  [facility]     Facts about COVID19 at www.cdc.gov/COVID19 and www.[place].gov/covid19     Keeping hands clean is one of the most important steps we can take to avoid getting sick and spreading germs to others.  Please wash your hands frequently and lather with soap for at least 20 seconds!          \"Much or all of the text " "in this note was generated through the use of Dragon Dictate voice to text software. Errors in spelling or words which appear to be out of contact are unintentional, may be present due having escaped editing\"     "

## 2021-02-22 NOTE — DISCHARGE INSTRUCTIONS
Behavioral Discharge Planning and Instructions  THANK YOU FOR CHOOSING [address]  [phone]    Summary: You were admitted to [facility] on 2/16/2021 for detoxification from sedative hypnotics (benzodiazepine) detoxification.  A medical exam was performed that included lab work. You have met with a  and opted to enter treatment at [facility]. Please take care and make your recovery a daily priority, Markus! It was a pleasure working with you and the entire treatment team here wishes you the very best in your recovery!     [facility]  [address] [place] [address]  [phone]    Main Diagnosis: Per per Dr. Addison Hung;  Benzodiazepine use disorder severe  Benzodiazepine withdrawal  Alcohol use disorder severe in early full remission  Schizoaffective disorder bipolar type    Major Treatments, Procedures and Findings:  You were treated for sedative hypnotic (benzodiazepine) detoxification using Phenobarbital. As an outpatient you will be prescribed Phenobarbital, please take this medication as prescribed until it is gone. You have met with a  to develop a plan for discharge. You had labs drawn and those results were reviewed with you. Please take a copy of your lab work with you to your next primary care provider appointment.    Symptoms to Report:  If you experience more anxiety, confusion, sleeplessness, deep sadness or thoughts of suicide, notify your treatment team or notify your primary care provider. IF ANY OF THE SYMPTOMS YOU ARE EXPERIENCING ARE A MEDICAL EMERGENCY CALL 911 IMMEDIATELY.     Lifestyle Adjustment: Adjust your lifestyle to get enough sleep, relaxation, exercise and good nutrition. Continue to develop healthy coping skills to decrease stress and promote a sober living environment. Do not use mood altering substances including alcohol, illegal drugs or addictive medications other than what is currently prescribed.      Disposition: [facility]    Facts about COVID19 at www.cdc.gov/COVID19 and www.[place].gov/covid19    Keeping hands clean is one of the most important steps we can take to avoid getting sick and spreading germs to others.  Please wash your hands frequently and lather with soap for at least 20 seconds!    Follow-up Appointment:   Primary Provider:   clayton Aparicio stated you will make a follow-up appointment with your Primary Care Doctor during your 90 day stay at [facility].     Recovery apps for your phone to locate current in person and zoom recovery meetings  [place] - meeting kit  AA  - meeting kit  Meeting guide - meeting kit  Quick NA meeting - meeting kit  Estephania- has various apps    Resources:  *due to covid-19 most AA/NA meetings are being held online*  AA meetings online search for them at: https://aa-intergroup.org (worldwide meeting listings)  AA meetings for [place] area can be found online at: https://a[place].org (click local online meetings listings)  NA meetings for [place] area can be found online at: https://www.nam[place].org  (click find a meeting)  AA and NA Sponsors are excellent resources for support and you can find one at any support group meeting.   Alcoholics Anonymous (https://aa.org/): for information 24 hours/day  AA Intergroup service office in [place] (http://www.aastpa[place].org/) [phone]  AA Intergroup service office in [facility]: [phone]. (http://www.a[place].org/)  Narcotics Anonymous (www.nam[place].org) (286) 632-4258  https://aafairviewriverside.org/meetings  SMART Recovery - self management for addiction recovery:  www.smartrecovery.org  Pathways ~ A Health Crisis Resource & Support Center:  384.847.1099.  https://prescribetoprevent.org/patient-education/videos/  http://www.harmreduction.org  [facility] [phone]  Support Group:  AA/NA and Sponsor/support.  National [place] on Mental Illness (www.[place].aiden.org): 397.667.1295 or  196.152.5060.  Alcoholics Anonymous (www.alcoholics-anonymous.org): Check your phone book for your local chapter.  Suicide Awareness Voices of Education (SAVE) (www.save.org): 871-174-ZBIC (8120)  National Suicide Prevention Line (www.mentalhealthmn.org): 836-379-KWDU (4773)  Mental Health Consumer/Survivor Network of [place] (www.mhcsn.net): 567.460.4484 or 523-678-4231  Mental Health Association of [place] (www.mentalhealth.org): 813.346.1459 or 015-311-4059   Substance Abuse and Mental Health Services (www.samhsa.gov)  [place] Opioid Prevention Coalition: www.opioidcoalition.org    [place] Recovery Connection (MRC)  [facility] connects people seeking recovery to resources that help foster and sustain long-term recovery.  Whether you are seeking resources for treatment, transportation, housing, job training, education, health care or other pathways to recovery, [facility] is a great place to start.  229.392.8191.  www.SnapSense.[place] Pod casts for nutrition and wellness  Listen on Apple Podcasts  [employer]   [employer]   Nutrition       Understand the connection between what you eat and how you feel. Hosted by licensed nutritionists and dietitians from [employer] we share practical, real-life solutions for healthier living through nutrition.     General Medication Instructions:   See your medication sheet(s) for instructions.   Take all medications as prescribed.  Make no changes unless your primary care provider suggests them.   Go to all your primary care provider visits.  Be sure to have all your required lab tests. This way, your medicines can be refilled on time.  Do not use any forms of alcohol.    Please Note:  If you have any questions at anytime after you are discharged please call [facility] detox unit 3AW at [phone].  [facility], Behavioral Intake [phone]  Medical Records call [phone]  Outpatient Behavioral Intake call  [phone]  LP+ Wait List/Bed Availability call [phone]    Please remember to take all of your behavioral discharge planning and lab paperwork to any follow up appointments, it contains your lab results, diagnosis, medication list and discharge recommendations.      THANK YOU FOR CHOOSING  [employer]

## 2021-02-22 NOTE — PROGRESS NOTES
Discharge note    Pt discharged to [facility] at 1pmAll belongings returned to pt, including locker contents; no items sent to security. Discharge medications provided to pt. Pt denies any MH sx at this time, including SI, SIB, and HI.      PRNs  Propranolol for anxiety- pt reported it was helpful for anxiety

## 2021-05-26 ENCOUNTER — RECORDS - HEALTHEAST (OUTPATIENT)
Dept: ADMINISTRATIVE | Facility: CLINIC | Age: 36
End: 2021-05-26

## 2021-06-02 ENCOUNTER — RECORDS - HEALTHEAST (OUTPATIENT)
Dept: ADMINISTRATIVE | Facility: CLINIC | Age: 36
End: 2021-06-02

## 2021-07-14 PROBLEM — F20.0 PARANOID SCHIZOPHRENIA (H): Status: RESOLVED | Noted: 2019-07-06 | Resolved: 2019-08-26

## 2021-07-14 PROBLEM — F25.8 OTHER SCHIZOAFFECTIVE DISORDERS (H): Status: RESOLVED | Noted: 2019-08-25 | Resolved: 2019-08-26

## 2021-07-14 PROBLEM — F29 PSYCHOSIS, UNSPECIFIED PSYCHOSIS TYPE (H): Status: RESOLVED | Noted: 2019-08-25 | Resolved: 2019-08-26

## 2021-12-15 ENCOUNTER — TRANSCRIBE ORDERS (OUTPATIENT)
Dept: OTHER | Age: 36
End: 2021-12-15

## 2021-12-15 DIAGNOSIS — R79.89 LOW TESTOSTERONE: Primary | ICD-10-CM

## 2022-01-07 ENCOUNTER — VIRTUAL VISIT (OUTPATIENT)
Dept: ENDOCRINOLOGY | Facility: CLINIC | Age: 37
End: 2022-01-07
Attending: PHYSICIAN ASSISTANT
Payer: COMMERCIAL

## 2022-01-07 DIAGNOSIS — R79.89 LOW TESTOSTERONE IN MALE: Primary | ICD-10-CM

## 2022-01-07 PROCEDURE — 99204 OFFICE O/P NEW MOD 45 MIN: CPT | Mod: 95 | Performed by: INTERNAL MEDICINE

## 2022-01-07 RX ORDER — ACETAMINOPHEN 500 MG
1000 TABLET ORAL EVERY 6 HOURS PRN
Status: ON HOLD | COMMUNITY
End: 2023-10-25

## 2022-01-07 RX ORDER — LOSARTAN POTASSIUM 50 MG/1
1 TABLET ORAL DAILY
COMMUNITY
Start: 2021-10-13 | End: 2023-10-18

## 2022-01-07 RX ORDER — MULTIPLE VITAMINS W/ MINERALS TAB 9MG-400MCG
1 TAB ORAL DAILY
COMMUNITY

## 2022-01-07 RX ORDER — METOPROLOL TARTRATE 50 MG
50 TABLET ORAL
COMMUNITY
Start: 2021-11-03 | End: 2023-10-18

## 2022-01-07 RX ORDER — AMLODIPINE BESYLATE 5 MG/1
1 TABLET ORAL DAILY
COMMUNITY
Start: 2021-11-18 | End: 2023-10-18

## 2022-01-07 RX ORDER — TOPIRAMATE 25 MG/1
TABLET, FILM COATED ORAL
COMMUNITY
Start: 2021-11-10 | End: 2023-10-18

## 2022-01-07 RX ORDER — BUSPIRONE HYDROCHLORIDE 10 MG/1
10 TABLET ORAL
COMMUNITY
Start: 2021-12-16 | End: 2022-04-17

## 2022-01-07 NOTE — PROGRESS NOTES
Markus is a 36 year old who is being evaluated via a billable video visit.      How would you like to obtain your AVS? Mail a copy  If the video visit is dropped, the invitation should be resent by: Text to cell phone: [phone]  Will anyone else be joining your video visit? No      Video Start Time: 8:00 AM  CC: Low Testosterone.     HPI:   Patient presents for evaluation of low testosterone.   He had his level checked as part of a workup for ED.   He has been prescribed viagra but it is too expensive to fill.   Also notes issues with low libido.   This goes back to his 20's.     Puberty was later than his peers.   He has never fathered a child.   No  surgery.     No changes in body or facial hair.     He does not snore. Used to snore but has lost 40 pounds in the last 5 months with lifestyle change.     ROS: 10 point ROS neg other than the symptoms noted above in the HPI.    PMH:   Patient Active Problem List   Diagnosis     Depression with anxiety     Primary insomnia     Gambling problem     Benzodiazepine dependence (H)      Meds:  Current Outpatient Medications   Medication     acetaminophen (TYLENOL) 500 MG tablet     amLODIPine (NORVASC) 5 MG tablet     busPIRone (BUSPAR) 10 MG tablet     cholecalciferol 25 MCG (1000 UT) TABS     hydrOXYzine (ATARAX) 25 MG tablet     losartan (COZAAR) 50 MG tablet     metoprolol tartrate (LOPRESSOR) 50 MG tablet     multivitamin w/minerals (MULTIVITAMINS W/MINERALS) tablet     nicotine polacrilex (NICORETTE) 4 MG gum     PHENobarbital (LUMINAL) 32.4 MG tablet     topiramate (TOPAMAX) 25 MG tablet     vilazodone (VIIBRYD) 40 MG TABS tablet     No current facility-administered medications for this visit.      FHX:   Grandfather had prostate cancer.     SHX:  No alcohol for 3 months.   Uses an e-cigarette.    Exam:   GENERAL: Healthy, alert and no distress  EYES: Eyes grossly normal to inspection.  No discharge or erythema, or obvious scleral/conjunctival  abnormalities.  HENT: Normal cephalic/atraumatic.  External ears, nose and mouth without ulcers or lesions.  No nasal drainage visible.  RESP: No audible wheeze, cough, or visible cyanosis.  No visible retractions or increased work of breathing.    MS: No gross musculoskeletal defects noted.  Normal range of motion.  No visible edema.  SKIN: Visible skin clear. No significant rash, abnormal pigmentation or lesions.  NEURO: Cranial nerves grossly intact.  Mentation and speech appropriate for age.  PSYCH: Mentation appears normal, affect normal/bright, judgement and insight intact, normal speech and appearance well-groomed.     A/P:   Low testosterone - Single low AM reading.       Discussed basics of testosterone replacement and what is a reasonable expectation: improvement in libido. Discussed AE's with testosterone. Discussed data on possible increased risk of CV events in patients using testosterone. Discussed how conclusive studies have not been done yet but it does raise concern and caution. He does not snore at night so low suspicion for CHAITANYA. Discussed fertility concerns with testosterone use. Discussed clomid use. He is homosexual and plans to adopt rather than father children.   -Schedule fasting labs. Possible pituitary MRI based on results.   -Hopefully he can taper off some BP medications as he continues to lose weight which will help with ED.   -Fax orders to [phone] (Fax) .     CC:   Shubham Arteaga, JOSEF    [address] [place] [address]    [phone] (Work)    [phone] (Fax)      Alex Norris M.D    Video-Visit Details    Type of service:  Video Visit    Video End Time:8:25 AM    Originating Location (pt. Location): Home    Distant Location (provider location):  [facility]     Platform used for Video Visit: Solaborate.

## 2022-01-07 NOTE — PROGRESS NOTES
Lab orders faxed as requested to [phone] (Fax) .     Copy of OV note faxed as requested to:    CC:   Shubham Arteaga, PARejiC    [address] [place] [address]    [phone] (Work)    [phone] (Fax)

## 2022-01-07 NOTE — LETTER
1/7/2022         RE: Markus Mosley  [address]  [facility] [address]        Dear Colleague,    Thank you for referring your patient, Markus Mosley, to the [facility]. Please see a copy of my visit note below.    Markus is a 36 year old who is being evaluated via a billable video visit.      How would you like to obtain your AVS? Mail a copy  If the video visit is dropped, the invitation should be resent by: Text to cell phone: [phone]  Will anyone else be joining your video visit? No      Video Start Time: 8:00 AM  CC: Low Testosterone.     HPI:   Patient presents for evaluation of low testosterone.   He had his level checked as part of a workup for ED.   He has been prescribed viagra but it is too expensive to fill.   Also notes issues with low libido.   This goes back to his 20's.     Puberty was later than his peers.   He has never fathered a child.   No  surgery.     No changes in body or facial hair.     He does not snore. Used to snore but has lost 40 pounds in the last 5 months with lifestyle change.     ROS: 10 point ROS neg other than the symptoms noted above in the HPI.    PMH:   Patient Active Problem List   Diagnosis     Depression with anxiety     Primary insomnia     Gambling problem     Benzodiazepine dependence (H)      Meds:  Current Outpatient Medications   Medication     acetaminophen (TYLENOL) 500 MG tablet     amLODIPine (NORVASC) 5 MG tablet     busPIRone (BUSPAR) 10 MG tablet     cholecalciferol 25 MCG (1000 UT) TABS     hydrOXYzine (ATARAX) 25 MG tablet     losartan (COZAAR) 50 MG tablet     metoprolol tartrate (LOPRESSOR) 50 MG tablet     multivitamin w/minerals (MULTIVITAMINS W/MINERALS) tablet     nicotine polacrilex (NICORETTE) 4 MG gum     PHENobarbital (LUMINAL) 32.4 MG tablet     topiramate (TOPAMAX) 25 MG tablet     vilazodone (VIIBRYD) 40 MG TABS tablet     No current facility-administered medications for this visit.      FHX:    Grandfather had prostate cancer.     SHX:  No alcohol for 3 months.   Uses an e-cigarette.    Exam:   GENERAL: Healthy, alert and no distress  EYES: Eyes grossly normal to inspection.  No discharge or erythema, or obvious scleral/conjunctival abnormalities.  HENT: Normal cephalic/atraumatic.  External ears, nose and mouth without ulcers or lesions.  No nasal drainage visible.  RESP: No audible wheeze, cough, or visible cyanosis.  No visible retractions or increased work of breathing.    MS: No gross musculoskeletal defects noted.  Normal range of motion.  No visible edema.  SKIN: Visible skin clear. No significant rash, abnormal pigmentation or lesions.  NEURO: Cranial nerves grossly intact.  Mentation and speech appropriate for age.  PSYCH: Mentation appears normal, affect normal/bright, judgement and insight intact, normal speech and appearance well-groomed.     A/P:   Low testosterone - Single low AM reading.       Discussed basics of testosterone replacement and what is a reasonable expectation: improvement in libido. Discussed AE's with testosterone. Discussed data on possible increased risk of CV events in patients using testosterone. Discussed how conclusive studies have not been done yet but it does raise concern and caution. He does not snore at night so low suspicion for CHAITANYA. Discussed fertility concerns with testosterone use. Discussed clomid use. He is homosexual and plans to adopt rather than father children.   -Schedule fasting labs. Possible pituitary MRI based on results.   -Hopefully he can taper off some BP medications as he continues to lose weight which will help with ED.   -Fax orders to [phone] (Fax) .     CC:   Shubham Arteaga, PARejiC    [address] [place] [address]    [phone] (Work)    [phone] (Fax)      Alex Norris M.D    Video-Visit Details    Type of service:  Video Visit    Video End Time:8:25 AM    Originating Location (pt. Location): Home    Distant  Location (provider location):  [facility]     Platform used for Video Visit: [employer].        Again, thank you for allowing me to participate in the care of your patient.        Sincerely,        Alex Norris MD

## 2022-01-07 NOTE — LETTER
1/7/2022         RE: Markus Mosley  [address]  [facility] [address]        Dear Colleague,    Thank you for referring your patient, Markus Mosley, to the [facility]. Please see a copy of my visit note below.    Markus is a 36 year old who is being evaluated via a billable video visit.      How would you like to obtain your AVS? Mail a copy  If the video visit is dropped, the invitation should be resent by: Text to cell phone: [phone]  Will anyone else be joining your video visit? No      Video Start Time: 8:00 AM  CC: Low Testosterone.     HPI:   Patient presents for evaluation of low testosterone.   He had his level checked as part of a workup for ED.   He has been prescribed viagra but it is too expensive to fill.   Also notes issues with low libido.   This goes back to his 20's.     Puberty was later than his peers.   He has never fathered a child.   No  surgery.     No changes in body or facial hair.     He does not snore. Used to snore but has lost 40 pounds in the last 5 months with lifestyle change.     ROS: 10 point ROS neg other than the symptoms noted above in the HPI.    PMH:   Patient Active Problem List   Diagnosis     Depression with anxiety     Primary insomnia     Gambling problem     Benzodiazepine dependence (H)      Meds:  Current Outpatient Medications   Medication     acetaminophen (TYLENOL) 500 MG tablet     amLODIPine (NORVASC) 5 MG tablet     busPIRone (BUSPAR) 10 MG tablet     cholecalciferol 25 MCG (1000 UT) TABS     hydrOXYzine (ATARAX) 25 MG tablet     losartan (COZAAR) 50 MG tablet     metoprolol tartrate (LOPRESSOR) 50 MG tablet     multivitamin w/minerals (MULTIVITAMINS W/MINERALS) tablet     nicotine polacrilex (NICORETTE) 4 MG gum     PHENobarbital (LUMINAL) 32.4 MG tablet     topiramate (TOPAMAX) 25 MG tablet     vilazodone (VIIBRYD) 40 MG TABS tablet     No current facility-administered medications for this visit.      FHX:    Grandfather had prostate cancer.     SHX:  No alcohol for 3 months.   Uses an e-cigarette.    Exam:   GENERAL: Healthy, alert and no distress  EYES: Eyes grossly normal to inspection.  No discharge or erythema, or obvious scleral/conjunctival abnormalities.  HENT: Normal cephalic/atraumatic.  External ears, nose and mouth without ulcers or lesions.  No nasal drainage visible.  RESP: No audible wheeze, cough, or visible cyanosis.  No visible retractions or increased work of breathing.    MS: No gross musculoskeletal defects noted.  Normal range of motion.  No visible edema.  SKIN: Visible skin clear. No significant rash, abnormal pigmentation or lesions.  NEURO: Cranial nerves grossly intact.  Mentation and speech appropriate for age.  PSYCH: Mentation appears normal, affect normal/bright, judgement and insight intact, normal speech and appearance well-groomed.     A/P:   Low testosterone - Single low AM reading.       Discussed basics of testosterone replacement and what is a reasonable expectation: improvement in libido. Discussed AE's with testosterone. Discussed data on possible increased risk of CV events in patients using testosterone. Discussed how conclusive studies have not been done yet but it does raise concern and caution. He does not snore at night so low suspicion for CHAITANYA. Discussed fertility concerns with testosterone use. Discussed clomid use. He is homosexual and plans to adopt rather than father children.   -Schedule fasting labs. Possible pituitary MRI based on results.   -Hopefully he can taper off some BP medications as he continues to lose weight which will help with ED.   -Fax orders to [phone] (Fax) .     CC:   Shubham Arteaga, PARejiC    [address] [place] [address]    [phone] (Work)    [phone] (Fax)      Alex Norris M.D    Video-Visit Details    Type of service:  Video Visit    Video End Time:8:25 AM    Originating Location (pt. Location): Home    Distant  Location (provider location):  [facility]     Platform used for Video Visit: [employer].        Again, thank you for allowing me to participate in the care of your patient.        Sincerely,        Alex Norris MD

## 2022-01-07 NOTE — Clinical Note
-Fax orders to [phone] (Fax) .     CC:   Shubham Arteaga, PARejiC    [address] [place] [address]    [phone] (Work)    [phone] (Fax)

## 2022-01-13 ENCOUNTER — TRANSFERRED RECORDS (OUTPATIENT)
Dept: HEALTH INFORMATION MANAGEMENT | Facility: CLINIC | Age: 37
End: 2022-01-13
Payer: COMMERCIAL

## 2022-01-13 LAB — TSH SERPL-ACNC: 2.03 UIU/ML (ref 0.45–4.5)

## 2022-02-16 ENCOUNTER — TELEPHONE (OUTPATIENT)
Dept: ENDOCRINOLOGY | Facility: CLINIC | Age: 37
End: 2022-02-16
Payer: COMMERCIAL

## 2022-02-16 NOTE — TELEPHONE ENCOUNTER
[facility]    Phone Message    May a detailed message be left on voicemail: yes , Patient states he had his labs done at [facility]. Patient provided [facility] with the fax number of [phone]. Patient is wanting to know if the results were received and wanting to know what they were. Patient would like a call back to discuss. Please advise.     Reason for Call: Requesting Results     Name/type of test: Lab    Date of test: n/a    Was test done at a location other than [facility] (Please fill in the location if not [facility])?: Yes: [facility]      Action Taken: Message routed to:  [facility] ([facility] Endo    Travel Screening: Not Applicable

## 2022-02-17 NOTE — TELEPHONE ENCOUNTER
Called patient and left a detailed message advising him to return call to update us as to when labs were done. Upon chart review, testosterone level was last checked at [facility] on 12/2/21. Is this the result patient is referring to?    Tip GARCIA RN....2/17/2022 10:47 AM

## 2022-02-23 NOTE — TELEPHONE ENCOUNTER
Patient called and stated he has [facility]. He was told that they would be faxed to [phone]. Upon chart review, there are no lab results. I asked patient to call to have them faxed to us. He will do this.    Tip GARCIA RN....2/23/2022 2:02 PM

## 2022-03-05 ENCOUNTER — HEALTH MAINTENANCE LETTER (OUTPATIENT)
Age: 37
End: 2022-03-05

## 2022-03-07 DIAGNOSIS — R79.89 LOW TESTOSTERONE IN MALE: Primary | ICD-10-CM

## 2022-03-15 ENCOUNTER — ANCILLARY PROCEDURE (OUTPATIENT)
Dept: MRI IMAGING | Facility: CLINIC | Age: 37
End: 2022-03-15
Attending: INTERNAL MEDICINE
Payer: COMMERCIAL

## 2022-03-15 DIAGNOSIS — R79.89 LOW TESTOSTERONE IN MALE: ICD-10-CM

## 2022-03-15 PROCEDURE — 70553 MRI BRAIN STEM W/O & W/DYE: CPT | Mod: TC | Performed by: RADIOLOGY

## 2022-03-15 PROCEDURE — A9585 GADOBUTROL INJECTION: HCPCS | Performed by: RADIOLOGY

## 2022-03-15 RX ORDER — GADOBUTROL 604.72 MG/ML
15 INJECTION INTRAVENOUS ONCE
Status: COMPLETED | OUTPATIENT
Start: 2022-03-15 | End: 2022-03-15

## 2022-03-15 RX ADMIN — GADOBUTROL 15 ML: 604.72 INJECTION INTRAVENOUS at 12:40

## 2022-04-18 ENCOUNTER — VIRTUAL VISIT (OUTPATIENT)
Dept: ENDOCRINOLOGY | Facility: CLINIC | Age: 37
End: 2022-04-18
Payer: COMMERCIAL

## 2022-04-18 DIAGNOSIS — N52.9 ERECTILE DYSFUNCTION, UNSPECIFIED ERECTILE DYSFUNCTION TYPE: ICD-10-CM

## 2022-04-18 DIAGNOSIS — R68.82 LOW LIBIDO: ICD-10-CM

## 2022-04-18 DIAGNOSIS — I10 HYPERTENSION, UNSPECIFIED TYPE: ICD-10-CM

## 2022-04-18 DIAGNOSIS — R35.89 POLYURIA: ICD-10-CM

## 2022-04-18 DIAGNOSIS — E66.09 CLASS 1 OBESITY DUE TO EXCESS CALORIES WITH SERIOUS COMORBIDITY AND BODY MASS INDEX (BMI) OF 30.0 TO 30.9 IN ADULT: ICD-10-CM

## 2022-04-18 DIAGNOSIS — E66.811 CLASS 1 OBESITY DUE TO EXCESS CALORIES WITH SERIOUS COMORBIDITY AND BODY MASS INDEX (BMI) OF 30.0 TO 30.9 IN ADULT: ICD-10-CM

## 2022-04-18 DIAGNOSIS — R79.89 LOW TESTOSTERONE IN MALE: Primary | ICD-10-CM

## 2022-04-18 PROCEDURE — 99215 OFFICE O/P EST HI 40 MIN: CPT | Mod: 95 | Performed by: INTERNAL MEDICINE

## 2022-04-18 NOTE — LETTER
4/18/2022         RE: Markus Mosley  [address]  [address]  [facility] [address]        Dear Colleague,    Thank you for referring your patient, Markus Mosley, to the [facility]. Please see a copy of my visit note below.    Video visit    Start time 3:14, stop time 4:01; additional 14 minutes spent on the date of the encounter doing chart review, documentation and further activities as noted.     Provider location: [facility], [address] [place] [id number]    Patient location: patient home    Mode of transmission: video     Subjective:    Established patient, previously saw Dr. Norris    Erectile dysfunction started in his 20s. Low libido started around age 29. These symptoms led to evaluation for low testosterone.     No loss of muscle mass. No decreased need to shave.     Puberty may have been slightly early compared to his peers. No children.     FSH/LH:  -12/2/2021 (first time ever checked), collected at 9:13 AM: total testosterone 234 ng/dL (ref range 264 - 916), free testosterone 10.2 pg/mL (ref range 8.7 - 25.1)  -1/13/2022, no time given and he recalls it was around a 1 PM collection: total testosterone 216 ng/dL (ref range 264 - 916), free testosterone 5.9 pg/mL (ref range 8.7 - 25.1), FSH 10.4 (ref range 1.5 - 12.4 mIU/mL)  -We reviewed the standard UTD list of etiologies of primary and secondary hypogonadism and they are negative unless noted above or below    ACTH:  -Weight has been decreasing, he has lost 55 # over 7 months with changes in diet and exercise   -No fatigue    TSH:  -1/2022: TSH 2.03   -no known thyroid disease    Prolactin:  -1/13/2022: prolactin normal   -no galactorrhea    GH:  -decrease in ring size with weight loss    DI:   -chronic polyuria    No prior nephrolithiasis. No prior low impact fractures.    No history of an eating disorder. No prior use of testosterone or other prescription or over the counter  "medications that raise testosterone.     No recent marijuana use. No use of glucocorticoids. No use of opioids in >12 months. No head injuries outside of a prior remote concussion in childhood.     He used to snore but no longer does after his recent significant weight loss.    MRI brain 3/15/2022: unremarkable including normal sella, I reviewed the images in detail and agree with the radiology read     Chest X-ray 9/2021: unremarkable per OSH radiology read    Testicular US 9/23/2017: normal testicles bilaterally     11/2021: GFR >90, serum calcium normal (no prior hypercalcemia), mild hypercholesterolemia, LFTs normal, HbA1c 5.5%, random glucose 84 mg/dL (no history of DM/DM medication use)     9/2021: CBC normal       No FH of hypogonadism.     Maternal grandfather with prostate cancer, no other prostate cancer in the family. Markus has no prostate pathology. No prior VTE. No FH of VTE. No FH of any endocrinopathy..    Comorbidities include: substance abuse including benzodiazepine use order, alcohol use disorder, and chart diagnoses of schizophrenia and schizoaffective disorder.    Objective:    5'8\", 200 # with BMI 30.4 kg/m2    Video visit.    Assessment/Plan:    # Low libido  # Erectile dysfunction    We reviewed the approach to diagnosis of hypogonadism.  Markus suspects his January 2022 testosterone was drawn in the afternoon, making it a not clinically useful test in the diagnosis of hypogonadism.    We will start with an 8 AM fasting blood draw to assess testosterone, FSH, LH.  We will also check his other pituitary hormones.  In anticipation of potentially starting therapy we will also check his hemoglobin and PSA.  We will also screen for hemochromatosis.  He also endorses polyuria and so we will check his serum sodium with serum and urine osmolality.    # Multi-drug hypertension    We will also check aldosterone and plasma renin activity.  Return to see me after testing.      Again, thank you for " allowing me to participate in the care of your patient.        Sincerely,        Ke Sheets MD

## 2022-04-18 NOTE — PROGRESS NOTES
Video visit    Start time 3:14, stop time 4:01; additional 14 minutes spent on the date of the encounter doing chart review, documentation and further activities as noted.     Provider location: [facility], [address] [place] [address] [address], [place] [address]    Patient location: patient home    Mode of transmission: video     Subjective:    Established patient, previously saw Dr. Norris    Erectile dysfunction started in his 20s. Low libido started around age 29. These symptoms led to evaluation for low testosterone.     No loss of muscle mass. No decreased need to shave.     Puberty may have been slightly early compared to his peers. No children.     FSH/LH:  -12/2/2021 (first time ever checked), collected at 9:13 AM: total testosterone 234 ng/dL (ref range 264 - 916), free testosterone 10.2 pg/mL (ref range 8.7 - 25.1)  -1/13/2022, no time given and he recalls it was around a 1 PM collection: total testosterone 216 ng/dL (ref range 264 - 916), free testosterone 5.9 pg/mL (ref range 8.7 - 25.1), FSH 10.4 (ref range 1.5 - 12.4 mIU/mL)  -We reviewed the standard UTD list of etiologies of primary and secondary hypogonadism and they are negative unless noted above or below    ACTH:  -Weight has been decreasing, he has lost 55 # over 7 months with changes in diet and exercise   -No fatigue    TSH:  -1/2022: TSH 2.03   -no known thyroid disease    Prolactin:  -1/13/2022: prolactin normal   -no galactorrhea    GH:  -decrease in ring size with weight loss    DI:   -chronic polyuria    No prior nephrolithiasis. No prior low impact fractures.    No history of an eating disorder. No prior use of testosterone or other prescription or over the counter medications that raise testosterone.     No recent marijuana use. No use of glucocorticoids. No use of opioids in >12 months. No head injuries outside of a prior remote concussion in childhood.     He used to snore but no longer does after his recent significant  "weight loss.    MRI brain 3/15/2022: unremarkable including normal sella, I reviewed the images in detail and agree with the radiology read     Chest X-ray 9/2021: unremarkable per OSH radiology read    Testicular US 9/23/2017: normal testicles bilaterally     11/2021: GFR >90, serum calcium normal (no prior hypercalcemia), mild hypercholesterolemia, LFTs normal, HbA1c 5.5%, random glucose 84 mg/dL (no history of DM/DM medication use)     9/2021: CBC normal       No FH of hypogonadism.     Maternal grandfather with prostate cancer, no other prostate cancer in the family. Markus has no prostate pathology. No prior VTE. No FH of VTE. No FH of any endocrinopathy..    Comorbidities include: substance abuse including benzodiazepine use order, alcohol use disorder, and chart diagnoses of schizophrenia and schizoaffective disorder.    Objective:    5'8\", 200 # with BMI 30.4 kg/m2    Video visit.    Assessment/Plan:    # Low libido  # Erectile dysfunction    We reviewed the approach to diagnosis of hypogonadism.  Markus suspects his January 2022 testosterone was drawn in the afternoon, making it a not clinically useful test in the diagnosis of hypogonadism.    We will start with an 8 AM fasting blood draw to assess testosterone, FSH, LH.  We will also check his other pituitary hormones.  In anticipation of potentially starting therapy we will also check his hemoglobin and PSA.  We will also screen for hemochromatosis.  He also endorses polyuria and so we will check his serum sodium with serum and urine osmolality.    # Multi-drug hypertension    We will also check aldosterone and plasma renin activity.  Return to see me after testing.  "

## 2022-11-20 ENCOUNTER — HEALTH MAINTENANCE LETTER (OUTPATIENT)
Age: 37
End: 2022-11-20

## 2023-04-15 ENCOUNTER — HEALTH MAINTENANCE LETTER (OUTPATIENT)
Age: 38
End: 2023-04-15

## 2023-10-18 ENCOUNTER — TELEPHONE (OUTPATIENT)
Dept: BEHAVIORAL HEALTH | Facility: CLINIC | Age: 38
End: 2023-10-18

## 2023-10-18 ENCOUNTER — HOSPITAL ENCOUNTER (INPATIENT)
Facility: CLINIC | Age: 38
LOS: 6 days | Discharge: HOME | End: 2023-10-26
Attending: EMERGENCY MEDICINE | Admitting: PSYCHIATRY & NEUROLOGY
Payer: COMMERCIAL

## 2023-10-18 DIAGNOSIS — F22 PARANOIA (H): ICD-10-CM

## 2023-10-18 DIAGNOSIS — F19.90 SUBSTANCE USE DISORDER: Primary | ICD-10-CM

## 2023-10-18 DIAGNOSIS — F41.9 ANXIETY: ICD-10-CM

## 2023-10-18 DIAGNOSIS — F51.5 NIGHTMARES: Chronic | ICD-10-CM

## 2023-10-18 DIAGNOSIS — F25.0 SCHIZOAFFECTIVE DISORDER, BIPOLAR TYPE (H): ICD-10-CM

## 2023-10-18 PROBLEM — F12.90 CANNABIS USE, UNSPECIFIED, UNCOMPLICATED: Status: ACTIVE | Noted: 2023-10-18

## 2023-10-18 PROBLEM — F41.1 GAD (GENERALIZED ANXIETY DISORDER): Status: ACTIVE | Noted: 2023-10-18

## 2023-10-18 LAB
AMPHETAMINES UR QL SCN: ABNORMAL
BARBITURATES UR QL SCN: ABNORMAL
BENZODIAZ UR QL SCN: ABNORMAL
BZE UR QL SCN: ABNORMAL
CANNABINOIDS UR QL SCN: ABNORMAL
FENTANYL UR QL: ABNORMAL
OPIATES UR QL SCN: ABNORMAL
PCP QUAL URINE (ROCHE): ABNORMAL

## 2023-10-18 PROCEDURE — 80307 DRUG TEST PRSMV CHEM ANLYZR: CPT | Performed by: EMERGENCY MEDICINE

## 2023-10-18 PROCEDURE — 250N000013 HC RX MED GY IP 250 OP 250 PS 637: Performed by: EMERGENCY MEDICINE

## 2023-10-18 PROCEDURE — 99285 EMERGENCY DEPT VISIT HI MDM: CPT | Performed by: EMERGENCY MEDICINE

## 2023-10-18 PROCEDURE — 250N000013 HC RX MED GY IP 250 OP 250 PS 637: Performed by: PSYCHIATRY & NEUROLOGY

## 2023-10-18 RX ORDER — GUANFACINE 1 MG/1
1 TABLET, EXTENDED RELEASE ORAL AT BEDTIME
Status: ON HOLD | COMMUNITY
End: 2023-10-25

## 2023-10-18 RX ORDER — GABAPENTIN 100 MG/1
1-2 CAPSULE ORAL 3 TIMES DAILY PRN
Status: ON HOLD | COMMUNITY
End: 2023-10-25

## 2023-10-18 RX ORDER — BUPRENORPHINE AND NALOXONE 12; 3 MG/1; MG/1
1 FILM, SOLUBLE BUCCAL; SUBLINGUAL DAILY
Status: ON HOLD | COMMUNITY
End: 2023-10-25

## 2023-10-18 RX ORDER — LISDEXAMFETAMINE DIMESYLATE 30 MG/1
CAPSULE ORAL
Status: ON HOLD | COMMUNITY
End: 2023-10-25

## 2023-10-18 RX ORDER — IBUPROFEN 600 MG/1
600 TABLET, FILM COATED ORAL ONCE
Status: COMPLETED | OUTPATIENT
Start: 2023-10-18 | End: 2023-10-18

## 2023-10-18 RX ORDER — QUETIAPINE FUMARATE 25 MG/1
25 TABLET, FILM COATED ORAL
Status: ON HOLD | COMMUNITY
End: 2023-10-25

## 2023-10-18 RX ORDER — HYDROXYZINE PAMOATE 50 MG/1
50 CAPSULE ORAL DAILY PRN
Status: ON HOLD | COMMUNITY
End: 2023-10-25

## 2023-10-18 RX ORDER — HYDROXYZINE HYDROCHLORIDE 25 MG/1
25 TABLET, FILM COATED ORAL EVERY 4 HOURS PRN
Status: DISCONTINUED | OUTPATIENT
Start: 2023-10-18 | End: 2023-10-20

## 2023-10-18 RX ORDER — OLANZAPINE 5 MG/1
5 TABLET, ORALLY DISINTEGRATING ORAL 3 TIMES DAILY PRN
Status: DISCONTINUED | OUTPATIENT
Start: 2023-10-18 | End: 2023-10-20

## 2023-10-18 RX ORDER — LOSARTAN POTASSIUM 25 MG/1
25 TABLET ORAL DAILY
COMMUNITY

## 2023-10-18 RX ORDER — VENLAFAXINE HYDROCHLORIDE 75 MG/1
75 CAPSULE, EXTENDED RELEASE ORAL
COMMUNITY
End: 2023-10-18

## 2023-10-18 RX ORDER — AMOXICILLIN 250 MG
1 CAPSULE ORAL DAILY
Status: ON HOLD | COMMUNITY
End: 2023-10-25

## 2023-10-18 RX ORDER — POLYETHYLENE GLYCOL 3350 17 G/17G
1 POWDER, FOR SOLUTION ORAL DAILY
Status: ON HOLD | COMMUNITY
End: 2023-10-26

## 2023-10-18 RX ORDER — IBUPROFEN 600 MG/1
600 TABLET, FILM COATED ORAL EVERY 6 HOURS PRN
Status: DISCONTINUED | OUTPATIENT
Start: 2023-10-18 | End: 2023-10-20

## 2023-10-18 RX ADMIN — OLANZAPINE 5 MG: 5 TABLET, ORALLY DISINTEGRATING ORAL at 19:24

## 2023-10-18 RX ADMIN — NICOTINE POLACRILEX 2 MG: 2 GUM, CHEWING ORAL at 13:35

## 2023-10-18 RX ADMIN — NICOTINE POLACRILEX 2 MG: 2 GUM, CHEWING ORAL at 14:43

## 2023-10-18 RX ADMIN — IBUPROFEN 600 MG: 600 TABLET, FILM COATED ORAL at 15:24

## 2023-10-18 RX ADMIN — NICOTINE POLACRILEX 2 MG: 2 GUM, CHEWING ORAL at 18:31

## 2023-10-18 RX ADMIN — HYDROXYZINE HYDROCHLORIDE 25 MG: 25 TABLET, FILM COATED ORAL at 17:51

## 2023-10-18 ASSESSMENT — ACTIVITIES OF DAILY LIVING (ADL)
ADLS_ACUITY_SCORE: 37

## 2023-10-18 NOTE — PHARMACY-ADMISSION MEDICATION HISTORY
Pharmacy Intern Admission Medication History    Admission medication history is complete. The information provided in this note is only as accurate as the sources available at the time of the update.    Information Source(s): Patient and CareEverywhere/SureScripts via in-person    Pertinent Information: Patient was somewhat knowledgeable of medications that he has been taking home and he manages medications himself. Amlodipine was discontinued on 10/11/23, so removed from the list. CareEverywhere indicated that he has been taking Losartan 25 mg PO daily, not 50 mg, and patient confirmed that he's taking 25 mg PO daily, so changed the dosing on the list. Patient confirmed not taking Metoprolol Tartate and Topiramate, so removed from the list. Seven other medications were found on dispense history report, so confirmed with the patient and added to the list, except Melatonin 10 mg. Patient was dispensed to take melatonin 10 mg PO daily, not he said he's not taking it, so not added to the list. Patient noted that he left Lisdexamfetamine (VYVANSE) at his other house, so he hasn't been taking it for a few days.    Changes made to PTA medication list:  Added:   Losartan (COZAAR) 25 mg tablet - Take 25 mg by mouth daily (Report indicates patient taking 25 mg tab PO daily, not 50 mg, patient confirmed)  Buprenorphine HCl-Naloxone HCl (SUBOXONE) 12-3 MG FILM per film - Place 1 Film under the tongue daily (Found on dispense history report, patient confirmed)  gabapentin (NEURONTIN) 100 MG capsule - Take 1-2 capsules by mouth 3 times daily as needed (Found on dispense history report, patient confirmed)  hydrOXYzine (VISTARIL) 50 MG capsule - Take 50 mg by mouth daily as needed for anxiety (Found on dispense history report, patient confirmed)  lisdexamfetamine (VYVANSE) 30 MG capsule - Take 30 mg by mouth every morning and 30 mg every day at noon (Found on dispense history report, patient confirmed)  polyethylene glycol  (MIRALAX) 17 g packet - Take 1 packet by mouth daily (Found on dispense history report, patient confirmed)  QUEtiapine (SEROQUEL) 25 MG tablet - Take 25 mg by mouth nightly as needed (for anxiety) (Found on dispense history report, patient confirmed)  senna-docusate (SENOKOT-S/PERICOLACE) 8.6-50 MG tablet - Take 1 tablet by mouth daily (Found on dispense history report, patient confirmed)  Guanfacine (INTUNIV) 1 mg TB24 24 hr tablet - Take 1 mg by mouth at bedtime (Found on dispense history report, patient confirmed taking it)  Deleted:   Amlodipine (NORVASC) 5 mg tablet - Take 1 tablet by mouth daily (Discontinued on 10/11/23, patient confirmed)  Losartan (COZAAR) 50 mg tablet - Take 1 tablet by mouth daily (Report indicates patient taking 25 mg tab PO daily, not 50 mg, patient confirmed)  Metoprolol tartate (LOPRESSOR) 50 mg tablet - Take 50 mg by mouth (Patient confirmed not taking)  Topiramate (TOPAMAX) 25 mg tablet - Take 1 to 2 tablets by mouth every day at bedtime as needed for nightmares (Patient confirmed not taking, no report on this med)  Changed:   Acetaminophen (TYLENOL) 500 mg tablet - Take 1000 mg by mouth -> Take 1000 mg by mouth every 6 hours as needed for pain (Patient confirmed taking as needed)    Allergies reviewed with patient and updates made in EHR: yes    Medication History Completed By: Hayeong Yun 10/18/2023 6:59 PM    Prior to Admission medications    Medication Sig Last Dose Taking? Auth Provider Long Term End Date   acetaminophen (TYLENOL) 500 MG tablet Take 1,000 mg by mouth every 6 hours as needed for pain Unknown Yes Reported, Patient     Buprenorphine HCl-Naloxone HCl (SUBOXONE) 12-3 MG FILM per film Place 1 Film under the tongue daily 10/18/2023 at AM Yes Unknown, Entered By History     gabapentin (NEURONTIN) 100 MG capsule Take 1-2 capsules by mouth 3 times daily as needed 10/17/2023 at PM Yes Unknown, Entered By History No    guanFACINE (INTUNIV) 1 MG TB24 24 hr tablet Take 1 mg  by mouth at bedtime 10/16/2023 at PM Yes Unknown, Entered By History     hydrOXYzine (VISTARIL) 50 MG capsule Take 50 mg by mouth daily as needed for anxiety 10/17/2023 at PM Yes Unknown, Entered By History     lisdexamfetamine (VYVANSE) 30 MG capsule Take 30 mg by mouth every morning and 30 mg every day at noon 10/13/2023 at PM Yes Unknown, Entered By History     losartan (COZAAR) 25 MG tablet Take 25 mg by mouth daily 10/18/2023 at AM Yes Unknown, Entered By History Yes    multivitamin w/minerals (THERA-VIT-M) tablet Take 1 tablet by mouth daily 10/18/2023 at AM Yes Reported, Patient     polyethylene glycol (MIRALAX) 17 g packet Take 1 packet by mouth daily 10/16/2023 at AM Yes Unknown, Entered By History     QUEtiapine (SEROQUEL) 25 MG tablet Take 25 mg by mouth nightly as needed (for anxiety) 10/17/2023 at PM Yes Unknown, Entered By History No    senna-docusate (SENOKOT-S/PERICOLACE) 8.6-50 MG tablet Take 1 tablet by mouth daily 10/16/2023 at AM Yes Unknown, Entered By History

## 2023-10-18 NOTE — ED PROVIDER NOTES
"  History     Chief Complaint   Patient presents with    Anxiety     Here with mom, \"I am having really bad anxiety to the point that it is makingit  difficult to go anywhere or do anything.\" Referred here from therapist this am.     HPI  Markus Mosley is a 37 year old male with a past medical history of anxiety and depression, substance abuse (benzodiazepines, alcohol, opiates, nicotine, amphetamines), insomnia, gambling addiction, hypertension who presents to the emergency department with a chief complaint of increased anxiety levels.  This has been making it difficult for him to go anywhere or do anything per his report.  He notes he has had anxiety in the past, but this is much more significant/different.  Per patient's report, over the past 2 weeks or so, he has had significantly increased anxiety and feels that he is being constantly triggered by \"sounds and noises.\"  The patient states that these are not noises that others cannot hear, but he feels incredibly sensitive to them.  During our conversation, his phone rang quietly and he noticed that he absolutely hates the sound and felt like it was triggering him.  Additionally, he notes that the sounds of the emergency department surrounding him are bothersome to him as well.  He notes feeling similarly at home when he hears various noises.  He states that this is not constant, but it has been very bothersome.  He also notes that he moved back in with his parents recently (initially, he states that this was 3 weeks ago, then he amends this that he thinks it was about a week and a half ago or 2 weeks ago).  Since that time, he notes that he has felt more anxious and has also been crying nearly every day.  Sometimes, he states there are very small triggers that make him cry.  He states that the other day he got some paint on his pants, which caused him to cry.  He states that this is very atypical for him as he does not remember another time that he has " "cried within the past 30 years other than these recent episodes.    The patient notes that he has been wearing noise canceling headphones to help combat the noises that have been triggering his anxiety.  He also expresses concerns that \"barometric pressure\" is damaging his eardrums and \"rewiring his brain.\"  He states he feels very paranoid.  However, he denies any auditory or visual hallucinations.    Additionally, the patient states that he is forgetting to do things such as brush his teeth or eat.  He presents to the emergency department today accompanied by his mother, who notes that he has not been sleeping.  The patient states that he feels like he can see the future and has been working \"with [place].\"  He does note that he has had some intermittent suicidal ideation as well, however, he has not had a plan for how to harm himself.  He states that he did scratch himself yesterday, but denies having cut himself or made any attempt to end his life.  Per chart review, it does appear that the patient's was seen at urgent care on 9/28 with a hand laceration that was too old to be repaired at that time.  He had called family medicine clinic a couple of days prior to that having reported cutting his hands, however, it was unclear at that time if this was a deliberate self-harm episode or not.  He does not report any intention to harm others.    The patient's mother is very concerned about him.  She does not think that he is acutely dangerous, however, she is concerned as he seems very disorganized and anxious.  She notes that he has not been taking his medications.  However, the patient states that he has been taking his medications.  Today, he reports having taken Suboxone and his blood pressure medication (losartan), and stating that he \"did not need anything else.\"  It is unclear which other medications the patient is supposed to be taking at this time.    Patient's mother notes that he does follow with a " "doctor as well as with a therapist who referred him here today for further evaluation.    Per review of family medicine annual visit notes from [facility] from January of this year, it appears that the patient was prescribed Wellbutrin  mg daily, guanfacine 1 mg daily, hydroxyzine 50 mg, losartan 25 mg daily, and multivitamin, nicotine lozenges/gum, and Suboxone.    Patient more recently saw an internal medicine physician on 10/11 for foot pain.  At that time, his outpatient medication list included acamprosate, amlodipine, diazepam, doxycycline, Vyvanse, loperamide, losartan, metoprolol, multivitamin, nicotine, ondansetron, Suboxone, vilazadone, however does not appear that these medications were addressed during that visit, so it is unclear if the patient is still taking these medications    The patient reports that he is \"not on drugs.\"  He does admit to recent THC/marijuana usage, but denies any other drug ingestions.  He states he has been taking his Suboxone.  He is concerned that the substances he has been using that he assumed were THC may have been tampered with and that somebody may be trying to poison him with other substances.    I have reviewed the Medications, Allergies, Past Medical and Surgical History, and Social History in the [facility] system.    Past Medical History:   Diagnosis Date    History of chickenpox      History reviewed. No pertinent surgical history.  No current facility-administered medications for this encounter.     Current Outpatient Medications   Medication    acetaminophen (TYLENOL) 500 MG tablet    amLODIPine (NORVASC) 5 MG tablet    losartan (COZAAR) 50 MG tablet    metoprolol tartrate (LOPRESSOR) 50 MG tablet    multivitamin w/minerals (THERA-VIT-M) tablet    topiramate (TOPAMAX) 25 MG tablet     Allergies   Allergen Reactions    Penicillins Swelling     Past medical history, past surgical history, medications, and allergies were reviewed with the patient. Additional " pertinent items: None    Social History     Socioeconomic History    Marital status: Single     Spouse name: Not on file    Number of children: Not on file    Years of education: Not on file    Highest education level: Not on file   Occupational History    Occupation: medical devices   Tobacco Use    Smoking status: Every Day     Types: Vaping Device     Last attempt to quit: 2014     Years since quittin.1    Smokeless tobacco: Former    Tobacco comments:     uses E-Cig   Substance and Sexual Activity    Alcohol use: Yes     Alcohol/week: 0.0 standard drinks of alcohol     Comment: socially    Drug use: Yes     Types: Benzodiazepines, Marijuana    Sexual activity: Not Currently     Partners: Female   Other Topics Concern     Service No    Blood Transfusions No    Caffeine Concern Yes     Comment: 6 cups caffeinated beverage daily    Occupational Exposure Not Asked    Hobby Hazards Not Asked    Sleep Concern Not Asked    Stress Concern Not Asked    Weight Concern Not Asked    Special Diet Not Asked    Back Care Not Asked    Exercise Not Asked    Bike Helmet Not Asked    Seat Belt No     Comment: wears    Self-Exams Not Asked   Social History Narrative    Completed HS.  Attended [employer] with degree in [employer], [employer].      Social Determinants of Health     Financial Resource Strain: Not on file   Food Insecurity: Not on file   Transportation Needs: Not on file   Physical Activity: Not on file   Stress: Not on file   Social Connections: Not on file   Interpersonal Safety: Not on file   Housing Stability: Not on file     Social history was reviewed with the patient. Additional pertinent items: None    Review of Systems  A medically appropriate review of systems was performed with pertinent positives and negatives noted in the HPI, and all other systems negative.    Physical Exam   BP: (!) 152/96  Pulse: 58  Temp: 98.5  F (36.9  C)  Resp: 16  SpO2: 100 %      General:  Well nourished, well developed, anxious appearing  HEENT: EOMI, anicteric. NCAT, MMM  Neck: no jugular venous distension, supple, nl ROM  Cardiac: Extremities well-perfused  Pulm: Airway patent, nonlabored breathing, normal respiratory rate  Skin: Warm and dry to the touch.  No rash  Extremities: No LE edema, no cyanosis, w/w/p  Neuro: Alert, moving all extremities, steady gait  Psych: Patient appears anxious and thought content is very disorganized, paranoid.  The patient reports recent episodes of tearfulness/depressed mood.  No homicidal ideation.  No current suicidal ideation, the patient does report recent episodes of suicidal ideation without plan.  Denies auditory or visual hallucinations.  Patient is mildly agitated, but cooperative and pleasant at time of my exam    ED Course        Procedures                           Labs Ordered and Resulted from Time of ED Arrival to Time of ED Departure   URINE DRUG SCREEN PANEL - Abnormal       Result Value    Amphetamines Urine Screen Negative      Barbituates Urine Screen Negative      Benzodiazepine Urine Screen Negative      Cannabinoids Urine Screen Positive (*)     Cocaine Urine Screen Negative      Fentanyl Qual Urine Screen Negative      Opiates Urine Screen Negative      PCP Urine Screen Negative              Results for orders placed or performed during the hospital encounter of 10/18/23 (from the past 24 hour(s))   Urine Drug Screen    Narrative    The following orders were created for panel order Urine Drug Screen.  Procedure                               Abnormality         Status                     ---------                               -----------         ------                     Urine Drug Screen Panel[[id number]]      Abnormal            Final result                 Please view results for these tests on the individual orders.   Urine Drug Screen Panel   Result Value Ref Range    Amphetamines Urine Screen Negative Screen Negative    Barbituates  Urine Screen Negative Screen Negative    Benzodiazepine Urine Screen Negative Screen Negative    Cannabinoids Urine Screen Positive (A) Screen Negative    Cocaine Urine Screen Negative Screen Negative    Fentanyl Qual Urine Screen Negative Screen Negative    Opiates Urine Screen Negative Screen Negative    PCP Urine Screen Negative Screen Negative       Labs, vital signs, and imaging studies were reviewed by me.    Medications   nicotine (NICORETTE) gum 2 mg (2 mg Buccal $Given 10/18/23 7871)       Assessments & Plan (with Medical Decision Making)   Markus Mosley is a 37 year old male who presents to the emergency department with increased anxiety, paranoia.  Patient appears anxious and thought content is very disorganized, paranoid.  Additionally, the patient reports recent episodes of tearfulness/depressed mood.  No homicidal ideation or current suicidal ideation, although the patient does report recent episodes of suicidal ideation without plan.  Additionally, there was a possible recent episode of self-harm a couple of weeks ago.  Patient is mildly agitated, but cooperative and pleasant at time of my exam.  Urine drug screen ordered to further evaluate the patient.    UDS positive for cannabinoids, otherwise negative    Patient discussed with DEC , they have met with the patient in the emergency department and recommend inpatient mental health admission at this time.  The patient is amenable to this although his mother has some concerns about whether this will be beneficial or not.  However, the patient is his own medical decision maker.    Critical care was not performed.     Medical Decision Making  The patient's presentation was of high complexity (an acute health issue posing potential threat to life or bodily function).    The patient's evaluation involved:  an assessment requiring an independent historian (patient's mother)  review of external note(s) from 3+ sources (recent urgent care  visits, previous family medicine clinic visits, internal medicine clinic visit)  review of 3+ test result(s) ordered prior to this encounter (previous drug screen on 10/25/2022 positive for benzodiazepines, otherwise negative.  Last TSH from October 9, 2022 within normal limits, also within normal limits on 6/30/2022)  ordering and/or review of 1 test(s) in this encounter (urine drug screen)  discussion of management or test interpretation with another health professional (mental health )    The patient's management necessitated high risk (a decision regarding hospitalization).    I have reviewed the nursing notes.    I have reviewed the findings, diagnosis, plan and need for follow up with the patient.    Patient to be admitted to inpatient mental health service for further management. Plan was discussed with patient who understands and agrees with plan.    New Prescriptions    No medications on file       Final diagnoses:   Anxiety   Paranoia (H)       ESTUARDO DAVID MD  10/18/2023   [facility] EMERGENCY DEPARTMENT       Estuardo David MD  10/18/23 9373

## 2023-10-18 NOTE — TELEPHONE ENCOUNTER
S: [facility] , DEC  Fco  calling at 2:05pm about a 37 year old/Male presenting with worrsening depression, anxiety, psychosis and Danette.     B: Pt arrived via Family. Presenting problem, stressors: Pt presenting with worsening depression, anxiety, psychosis and Danette -  Pt does endorse Visual hallucinations seeing energy.   Pt mom brought pt in as he has been increasingly anxious and paranoid.  Pt had been crying a lot and unable to function @ home or work.  Pt recently moved in with his parents andf mom has recognized his inability to function    Pt affect in ED: Disorganized, Labile, and Tangential  Pt Dx: Major Depressive Disorder, Generalized Anxiety Disorder, Schizophrenia, and ADHD  Previous IPMH hx? Yes: Last IP was 2022 @ [facility]  Pt denies SI   Hx of suicide attempt? No  Pt endorses SIB via scratching, most recent episode yesterday  Pt denies HI   Pt endorses visual hallucination .   Pt RARS Score: 3    Hx of aggression/violence, sexual offenses, legal concerns, Epic care plan? describe: Currently on probation for assaulting a   Current concerns for aggression this visit? No  Does pt have a history of Civil Commitment? No  Is Pt their own guardian? Yes    Pt is prescribed medication. Is patient medication compliant? No  Pt endorses OP services: Psychiatrist and Therapist  CD concerns: Actively using/consuming Medical Marijuana  Acute or chronic medical concerns: none  Does Pt present with specific needs, assistive devices, or exclusionary criteria? None      Pt is ambulatory  Pt is able to perform ADLs independently      A: Pt to be reviewed for [facility] admission. Pt is Voluntary  Preferred placement: [facility] and/or [place] as pt was @ [facility] before    COVID Symptoms: No  If yes, COVID test required   Utox: Positive for marijuana    CMP: WNL  CBC: WNL  HCG: N/A    R: Patient cleared and ready for behavioral bed placement: Yes  Pt placed on IP worklist? Yes    Does Patient  need a Transfer Center request created? No, Pt is located within [facility] ED, [facility] ED, or [place] ED

## 2023-10-18 NOTE — CONSULTS
DEC Consult Order placed. DEC assessment completed by Fco Lombardo on 10/18/23 at 2:13 PM. Consult acknowledged and completed.     Zoe Leary

## 2023-10-18 NOTE — DISCHARGE INSTRUCTIONS
Behavioral Discharge Planning and Instructions    Summary: You were admitted on 10/18/2023  due to Anxiety, Delusional Thoughts, and Psychotic Symptomology.  You were treated by Dr Caro Heller and discharged on 10/26/2023 from [facility]    Main Diagnosis: Schizoaffective Disorder    Health Care Follow-up:   Appointment Date/Time: Friday, 10/27 at 11am   Psychiatrist/Primary Care Giver: Franco Knowles /Dr. Moralez       Appointment Date/Time: Wednesday November 1st at 1pm   Therapist: Franco Knowles      Attend all scheduled appointments with your outpatient providers. Call at least 24 hours in advance if you need to reschedule an appointment to ensure continued access to your outpatient providers.     [place] [facility] (IOP) Referral:    The Patient Navigator Coordination Team  A Patient Navigation Coordinator will contact you within 48 hours to complete your admission or referral. Please contact them directly if you have any questions.  Phone: [phone]  Fax: [phone]  E-mail: [email or web address]      Major Treatments, Procedures and Findings:  You were provided with: a psychiatric assessment, assessed for medical stability, medication evaluation and/or management, group therapy, milieu management, and IOP assessment    Symptoms to Report: increased confusion, losing more sleep, mood getting worse, or thoughts of suicide    Early warning signs can include: increased depression or anxiety sleep disturbances increased thoughts or behaviors of suicide or self-harm  increased unusual thinking, such as paranoia or hearing voices    Safety and Wellness:  Take all medicines as directed.  Make no changes unless your doctor suggests them.      Follow treatment recommendations.  Refrain from alcohol and non-prescribed drugs.  If there is a concern for safety, call 911.    Resources:   Crisis Intervention: [phone] or [phone] (TTY:  "[phone]).  Call anytime for help.  [facility] Crisis Response - Adult [phone]  Text 4 Life: txt \"LIFE\" to 08219 for immediate support and crisis intervention  Crisis text line: Text \"[place]\" to 181932. Free, confidential, 24/7.  [facility] Mental Health Crisis Response Team - Child: [phone]    General Medication Instructions:   See your medication sheet(s) for instructions.   Take all medicines as directed.  Make no changes unless your doctor suggests them.   Go to all your doctor visits.  Be sure to have all your required lab tests. This way, your medicines can be refilled on time.  Do not use any drugs not prescribed by your doctor.  Avoid alcohol.    Advance Directives:   Scanned document on file with [employer]? No scanned doc  Is document scanned? Pt states no documents  Honoring Choices Your Rights Handout: Informed and given  Was more information offered? Pt declined    The Treatment team has appreciated the opportunity to work with you. If you have any questions or concerns about your recent admission, you can contact the unit which can receive your call 24 hours a day, 7 days a week. They will be able to get in touch with a Provider if needed. The unit number is [phone] .    "

## 2023-10-18 NOTE — ED NOTES
"Writer met and spoke with patient. Patient appears guarded at first but then opens up as conversation continues. Patient maintains minimal eye contact at first. Appears anxious. Patient told writer that he has not been mentally stable. . Patient said  \" I have been rapped a bunch of time\".  Patient said he does not remember who but told the writer that his parents know the person. Patient said he came in because he has been struggling with anxiety, trauma and he is very scarred. Patient reported that he felt suicidal yesterday with no plan and denies SI, HI /SIB at this time.  Patient thinks people are chasing and are trying to poison and kill him. Patient says he hears sounds  and directions from God for a peace marker.  Patient denies visual hallucinations . Denies any intent to hurt self or others and contracts for safety while in the unit. Patient endorse anxiety 5/10 , depression 8/10 and pain 6/10. Prn Ibuprofen administered to patient for ear pain prior to coming to us. Will follow up for effectiveness. No aggressive behavior noted at this time. Will continue to monitor and provide support.  "
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
## 2023-10-18 NOTE — CARE PLAN
After Visit Summary   7/8/2018    Mio Kinney    MRN: [id number]           Patient Information     Date Of Birth          2000        Visit Information        Provider Department      7/8/2018 11:15 AM Maureen Wing PA-C [facility]        Today's Diagnoses     Throat pain    -  1    Rash and nonspecific skin eruption           Follow-ups after your visit        Your next 10 appointments already scheduled     Aug 06, 2018  6:30 PM CDT   [facility]    [address]  [facility] [id number] [phone]              Who to contact     If you have questions or need follow up information about today's clinic visit or your schedule please contact [facility] directly at [phone].  Normal or non-critical lab and imaging results will be communicated to you by MyChart, letter or phone within 4 business days after the clinic has received the results. If you do not hear from us within 7 days, please contact the clinic through MyChart or phone. If you have a critical or abnormal lab result, we will notify you by phone as soon as possible.  Submit refill requests through [facility] or call your pharmacy and they will forward the refill request to us. Please allow 3 business days for your refill to be completed.          Additional Information About Your Visit        MyChart Information     [employer] gives you secure access to your electronic health record. If you see a primary care provider, you can also send messages to your care team and make appointments. If you have questions, please call your primary care clinic.  If you do not have a primary care provider, please call [phone] and they will assist you.        Care EveryWhere ID     This is your Care EveryWhere ID. This could be used by other organizations to access your [facility]  "Markus Mosley  October 18, 2023  Plan of Care Hand-off Note     Patient Care Path: inpatient mental health    Plan for Care:   Pt presenting in the ER today due to worsening of labile mood, and paranoia.  Pt remarked, \"I can't stop crying and I don't feel right!\"  Pt had pressured, tangential and disorganized speech.  Pt appeared to be manic as evidenced by increased energy, lack of sleep, and grandiose thinking.  Pt remarked, \"I feel like I'm growing, getting stronger and my feet are growing, I can predict the future successfully!\"  Pt endorsed increased depression, cry, flashbacks, isolation, worry, racing thoughts and anxiety. Pt shared he mostly worried about disappointing his parents. Pt reported having poor sleep and loss of appetite. Pt endorsed paranoia as he felt someone was watching him and people were trying to poison him. Pt denied having auditory hallucination but endorsed visual hallucination of seeing both positive and negative energies. Pt denied having suicidal and homicidal ideations. Pt denied having access to firearms. Pt endorsed SIB by cutting many years ago but reported recent scratching on his skin until it started to bleed. Pt reported history of suicide attempt 2x by overdosing pills. Pt complained he has no testosterone. Pt identified losing his sister and friend who recently passed away as stressors in his life.  Pt has not been taking his psychiatric medications consistently.  Pt has limited coping skills, impaired judgment, daily functioning and self-care.  Pt was not able to engage in his [facility] Aftercare plan due to labile mood as he was appropriate for inpatient psychiatric service.  [facility] will follow up with Pt to provide further therapeutic support while on boarding.    Identified Goals and Safety Issues: Pt currently denied having suicidal ideations, intent and plan.  Pt reported he engaged in SIB by scartching on himself yesterday.    Overview:  Mom, Tamera Mosley " [phone] (P) Saulo Flores [phone]     (P) Only allow calls and visits from designated visitors and care team members    Legal Status: Legal Status at Admission: Voluntary/Patient has signed consent for treatment    Psychiatry Consult:       Updated   regarding plan of care.           Fco Lombardo, JOHANNSW        records  [id number]        Your Vitals Were     Pulse Temperature Pulse Oximetry BMI (Body Mass Index)          64 98.5  F (36.9  C) (Oral) 96% 21.09 kg/m2         Blood Pressure from Last 3 Encounters:   07/08/18 118/68   03/14/18 106/62   08/24/17 110/78    Weight from Last 3 Encounters:   07/08/18 147 lb (66.7 kg) (41 %)*   03/14/18 147 lb (66.7 kg) (43 %)*   08/24/17 137 lb (62.1 kg) (30 %)*     * Growth percentiles are based on [facility] 2-20 Years data.              We Performed the Following     Beta strep group A culture     Strep, Rapid Screen        Primary Care Provider Office Phone # Fax #    Moises Palma [phone] [address] [facility] [address]        Equal Access to Services     [facility] : Hadii shawn hernándezo Sogilberto, waaxda luqadaha, qaybta kaalmada adejaimeyaannette, dinorah ruth . So [facility] [phone].    ATENCIÓN: Si habla español, tiene a torre disposición servicios gratuitos de asistencia lingüística. Llame al [phone].    We comply with applicable federal civil rights laws and [place] laws. We do not discriminate on the basis of race, color, national origin, age, disability, sex, sexual orientation, or gender identity.            Thank you!     Thank you for choosing [facility]  for your care. Our goal is always to provide you with excellent care. Hearing back from our patients is one way we can continue to improve our services. Please take a few minutes to complete the written survey that you may receive in the mail after your visit with us. Thank you!             Your Updated Medication List - Protect others around you: Learn how to safely use, store and throw away your medicines at www.disposemymeds.org.      Notice  As of 7/8/2018 12:12 PM    You have not been prescribed any medications.

## 2023-10-18 NOTE — ED NOTES
IP MH Referral Acuity Rating Score (RARS)    LMHP complete at referral to IP MH, with [facility]; and, daily while awaiting IP MH placement. Call score to PPS.  CRITERIA SCORING   New 71 HH and Involuntary for IP MH (not adolescent) 0/1   Boarding over 24 hours 0/1   Vulnerable adult at least 55+ with multiple co morbidities; or, Patient age 11 or under 0/1   Suicide ideation without relief of precipitating factors 0/1   Current plan for suicide 0/1   Current plan for homicide 0/1   Imminent risk or actual attempt to seriously harm another without relief of factors precipitating the attempt 0/1   Severe dysfunction in daily living (ex: complete neglect for self care, extreme disruption in vegetative function, extreme deterioration in social interactions) 1/1   Recent (last 2 weeks) or current physical aggression in the ED 0/1   Restraints or seclusion episode in ED 0/1   Verbal aggression, agitation, yelling, etc., while in the ED 0/1   Active psychosis with psychomotor agitation or catatonia 0/1   Need for constant or near constant redirection (from leaving, from others, etc).  1/1   Intrusive or disruptive behaviors 1/1   TOTAL Acuity Total Score: 3

## 2023-10-19 LAB — SARS-COV-2 RNA RESP QL NAA+PROBE: NEGATIVE

## 2023-10-19 PROCEDURE — 250N000013 HC RX MED GY IP 250 OP 250 PS 637: Performed by: EMERGENCY MEDICINE

## 2023-10-19 PROCEDURE — 250N000013 HC RX MED GY IP 250 OP 250 PS 637: Performed by: PSYCHIATRY & NEUROLOGY

## 2023-10-19 PROCEDURE — 250N000013 HC RX MED GY IP 250 OP 250 PS 637: Performed by: STUDENT IN AN ORGANIZED HEALTH CARE EDUCATION/TRAINING PROGRAM

## 2023-10-19 PROCEDURE — 87635 SARS-COV-2 COVID-19 AMP PRB: CPT | Performed by: EMERGENCY MEDICINE

## 2023-10-19 RX ORDER — MULTIPLE VITAMINS W/ MINERALS TAB 9MG-400MCG
1 TAB ORAL DAILY
Status: DISCONTINUED | OUTPATIENT
Start: 2023-10-19 | End: 2023-10-26 | Stop reason: HOSPADM

## 2023-10-19 RX ORDER — BUPRENORPHINE AND NALOXONE 4; 1 MG/1; MG/1
1 FILM, SOLUBLE BUCCAL; SUBLINGUAL DAILY
Status: DISCONTINUED | OUTPATIENT
Start: 2023-10-19 | End: 2023-10-26 | Stop reason: HOSPADM

## 2023-10-19 RX ORDER — GUANFACINE 1 MG/1
1 TABLET, EXTENDED RELEASE ORAL AT BEDTIME
Status: DISCONTINUED | OUTPATIENT
Start: 2023-10-19 | End: 2023-10-26 | Stop reason: HOSPADM

## 2023-10-19 RX ORDER — QUETIAPINE FUMARATE 25 MG/1
25 TABLET, FILM COATED ORAL
Status: DISCONTINUED | OUTPATIENT
Start: 2023-10-19 | End: 2023-10-20

## 2023-10-19 RX ORDER — BUPRENORPHINE AND NALOXONE 8; 2 MG/1; MG/1
1 FILM, SOLUBLE BUCCAL; SUBLINGUAL DAILY
Status: DISCONTINUED | OUTPATIENT
Start: 2023-10-19 | End: 2023-10-26 | Stop reason: HOSPADM

## 2023-10-19 RX ORDER — AMOXICILLIN 250 MG
1 CAPSULE ORAL DAILY
Status: DISCONTINUED | OUTPATIENT
Start: 2023-10-19 | End: 2023-10-20

## 2023-10-19 RX ORDER — POLYETHYLENE GLYCOL 3350 17 G/17G
17 POWDER, FOR SOLUTION ORAL DAILY
Status: DISCONTINUED | OUTPATIENT
Start: 2023-10-19 | End: 2023-10-20

## 2023-10-19 RX ORDER — HYDROXYZINE PAMOATE 50 MG/1
50 CAPSULE ORAL DAILY PRN
Status: DISCONTINUED | OUTPATIENT
Start: 2023-10-19 | End: 2023-10-19

## 2023-10-19 RX ORDER — GABAPENTIN 100 MG/1
100-200 CAPSULE ORAL 3 TIMES DAILY PRN
Status: DISCONTINUED | OUTPATIENT
Start: 2023-10-19 | End: 2023-10-20

## 2023-10-19 RX ORDER — BUPRENORPHINE AND NALOXONE 12; 3 MG/1; MG/1
1 FILM, SOLUBLE BUCCAL; SUBLINGUAL DAILY
Status: DISCONTINUED | OUTPATIENT
Start: 2023-10-19 | End: 2023-10-19 | Stop reason: ALTCHOICE

## 2023-10-19 RX ORDER — LOSARTAN POTASSIUM 25 MG/1
25 TABLET ORAL DAILY
Status: DISCONTINUED | OUTPATIENT
Start: 2023-10-19 | End: 2023-10-26 | Stop reason: HOSPADM

## 2023-10-19 RX ADMIN — NICOTINE POLACRILEX 2 MG: 2 GUM, CHEWING ORAL at 16:15

## 2023-10-19 RX ADMIN — SENNOSIDES AND DOCUSATE SODIUM 1 TABLET: 50; 8.6 TABLET ORAL at 13:16

## 2023-10-19 RX ADMIN — BUPRENORPHINE AND NALOXONE 1 FILM: 4; 1 FILM, SOLUBLE BUCCAL; SUBLINGUAL at 10:02

## 2023-10-19 RX ADMIN — POLYETHYLENE GLYCOL 3350 17 G: 17 POWDER, FOR SOLUTION ORAL at 13:16

## 2023-10-19 RX ADMIN — NICOTINE POLACRILEX 2 MG: 2 GUM, CHEWING ORAL at 14:04

## 2023-10-19 RX ADMIN — MULTIPLE VITAMINS W/ MINERALS TAB 1 TABLET: TAB at 13:16

## 2023-10-19 RX ADMIN — QUETIAPINE FUMARATE 25 MG: 25 TABLET ORAL at 19:18

## 2023-10-19 RX ADMIN — IBUPROFEN 600 MG: 600 TABLET, FILM COATED ORAL at 20:48

## 2023-10-19 RX ADMIN — LOSARTAN POTASSIUM 25 MG: 25 TABLET, FILM COATED ORAL at 14:24

## 2023-10-19 RX ADMIN — GUANFACINE 1 MG: 1 TABLET, EXTENDED RELEASE ORAL at 20:48

## 2023-10-19 RX ADMIN — NICOTINE POLACRILEX 2 MG: 2 GUM, CHEWING ORAL at 08:13

## 2023-10-19 RX ADMIN — OLANZAPINE 5 MG: 5 TABLET, ORALLY DISINTEGRATING ORAL at 19:18

## 2023-10-19 RX ADMIN — NICOTINE POLACRILEX 2 MG: 2 GUM, CHEWING ORAL at 19:18

## 2023-10-19 RX ADMIN — NICOTINE POLACRILEX 2 MG: 2 GUM, CHEWING ORAL at 12:32

## 2023-10-19 RX ADMIN — BUPRENORPHINE AND NALOXONE 1 FILM: 8; 2 FILM, SOLUBLE BUCCAL; SUBLINGUAL at 10:02

## 2023-10-19 ASSESSMENT — ACTIVITIES OF DAILY LIVING (ADL)
ADLS_ACUITY_SCORE: 37

## 2023-10-19 NOTE — TELEPHONE ENCOUNTER
[place]: [place]  Access Inpatient Bed Call Log 10/19/23 12:32 AM     Intake has called facilities that have not updated the bed status within the last 12 hours.  ([facility] + [facility])                   [facility] is at capacity.              [facility] is posting 0 beds. [phone].    [facility] is posting 0 beds. Negative covid required.                 [facility] is posting 2 beds. Negative covid required. [phone] 10/19 Per call @12:55 am to Mandi to call after 8am.    [facility] is posting 0 bed. [phone]   [facility] is posting 0 beds. [phone].    [facility] is posting 1 beds Call for details. Negative covid.  [phone]. 10/19 Pt not appropriate d/t facility restrictions.   [facility] is posting 0 beds           [facility] ([facility] is posting 0 beds.   [facility] ([place]) is posting 0 beds. Low acuity only. Negative covid.  [phone].    [facility] ([place]) is posting 2 beds available. Negative covid.  [phone].       [facility] ([place]) is posting 4 beds. Low acuity only. Negative covid.  [phone].    Joe   Patient remains on the work list pending appropriate bed availability.         5:04 AM Per call to Jerald will need all labs Salicylate, Asprin, Acetaminophen, ETOH, Covid and TSH completed before review.  5:10 AM Intake requested all labs for review.

## 2023-10-19 NOTE — PROGRESS NOTES
"Triage & Transition Services, Extended Care     Therapy Progress Note    Patient: Markus goes by \"Markus,\" uses he/him pronouns  Date of Service: 2023  Site of Service: [facility]  Patient was seen in-person.     Presenting problem:   Markus is followed related to Long wait time for admission: since 10/18/2023 . Please see initial DEC/LM Crisis Assessment completed by Fco Lombardo on 10/18/2023 for complete assessment information. Notable concerns include: symptoms of magali and psychosis.     Individuals Present: Markus & JOAQUIN Ny    Session start: 220p  Session end: 303p  Session duration in minutes: 43 min  Session number: 1  Anticipated number of sessions or this episode of care: 1-3  CPT utilized: 52024 - Psychotherapy (with patient) - 45 (38-52*) min    Current Presentation:   Writer met with pt in consultation room in the [facility].     Patient was calm during interaction with writer, however, at times expressed hesitation regarding how much he wanted to share with writer, somewhat guarded in conversation.  Patient was tangential in conversation, often jumping to a different topic after writer would ask a question, and speaking in contradictions.  At one point patient also paused, appearing to have difficulty answering questions, and then proceeded to ask writer what he supposed to be talking about. Pt has delayed response to writer's questions, so there is some suspicion of thought blocking.     Patient reports his friend and his sister  recently, impacting how he has been doing.  Patient reports the reason for his sister's death is not fully known.  Patient expresses \"everyone thinks there is something wrong with me,\" however, reports he does not think there is something wrong with him.  Patient describes feeling \"in tune\" with sounds and feelings, describing the sound of the leather chair that he is sitting on during conversation with writer.  Patient reports significant history of " "trauma, while at the same time expresses he does not know if he has experienced trauma.  Patient speaks in contradictions throughout much of the encounter.  Patient expresses feeling like he cannot do anything, while at the same time expresses feeling like he could do anything.  Patient reports feeling \"magical,\" with the ability to predict the future.  Patient also reports feeling like he has super Sonic hearing.  Patient reports that in the morning he feels really strong. Pt also reports he does not have any testosterone in his body.  Patient reports certain sounds and smells that \"trigger\" him and that guide for calm him.  Patient reports he feels like he keeps seeing himself, expressing concern that the [employer] might be cloning him.  Patient also expresses seeing his sister and his other sister.  Shortly after, patient expresses feeling like he sees his sister and writer.    Patient reports over the past month things have been \"terrible.\"  Patient reports crying almost every day, and not understanding why.  Patient became tearful in conversation.  Patient reports also having \"episodes\" such as thinking something is in his ear or thinking something is wrong with his feet.    Patient reports today he has felt like a long day, reporting it feels like his emotions are changing fast.  Patient discloses at one point today having an urge to cut himself, but reports he has not experienced this in a long time. Pt denies current thoughts of wishing he were dead.     Patient expresses concern that he has been forgetting to eat.  Patient reports he was able to eat breakfast this morning, but did not eat lunch because the texture was \"weird\" and he reports it \"looked like correction food.\"    Patient reports having difficulty with empathy and connecting with others.  However, patient reports that he wants to get better and wants to help people.  At the same time patient reports that he does not feel like there is anything wrong " with him.  Patient reports that he has also been losing track of time, expressing that people think this is because he smokes marijuana.     Mental Status Exam:   Appearance: awake, alert and dressed in hospital scrubs  Attitude: guarded  Eye Contact: poor   Mood: anxious and depressed  Affect: labile  Speech: increased speech latency  Psychomotor Behavior: no evidence of tardive dyskinesia, dystonia, or tics  Thought Process:  evidence of thought blocking present and tangental  Associations: no loose associations  Thought Content:  No SI currently  Insight: limited  Judgement: limited  Oriented to: time, person, and place  Attention Span and Concentration: fair  Recent and Remote Memory:  variable    Diagnosis:    Schizoaffective disorder, bipolar type (H)-primary F25.0    ELEN (generalized anxiety disorder) F41.1    Cannabis use, unspecified, uncomplicated F12.90       Therapeutic Intervention(s):   Provided active listening, unconditional positive regard, and validation. Engaged in guided discovery, explored patient's perspectives and helped expand them through socratic dialogue.    Treatment Objective(s) Addressed:   The focus of this session was on rapport building, orienting the patient to therapy, and assessing safety.     Case Management:   NA     General Recommendations:   Continue to monitor for harm. Consider: Be firm but gentle when redirecting and Listen in a neutral, non-judgmental way. Offer reassurance    Plan:   Inpatient Mental Health: Pt initially presented to the ED due to worsening mood lability and paranoia, and presented with pressured, tangential, and disorganized speech, as well as symptoms of magali including:  increased energy, lack of sleep, and grandiose thinking. Pt continues to present with tangential speech, and at times pt had demonstrated difficulty in tracking conversation. Pt speaks with contradicting thoughts of feeling like he is not okay, and also feeling like he is just fine. Pt  expresses magical thinking with ability to predict the future.     Writer continues to recommend IP MH admission for safety and stabilization of acute MH symptoms of psychosis. Writer placed ED psychiatry consultation for review of medication to alleviate symptoms of psychosis while awaiting IP MH admission.     Pt is agreeable to current POC.     Plan for Care reviewed with Assigned Medical Provider? Yes. Provider, Dr Carbajal, response: agreement     JOAQUIN Ny   Licensed Mental Health Professional (LMHP), [facility]  [phone]

## 2023-10-19 NOTE — PROGRESS NOTES
Triage & Transition Services, Extended Care     Client Name: Markus Mosley    Date: October 18, 2023  Service Type: Group Therapy  Session Start Time: 5:35 pm  Session End Time: 5:50 pm  Session Length: 15 min  Site Location: [facility]  Attendees: Patient and other group members  Facilitator: Ila Do     Topic:    10 Minute Guided Progressive Muscle Relaxation audio recording     Intervention:    Group process: support, challenge, affirm, psycho-education. Writer modeled the prompts from the recording. Writer motioned for patient to come back to group. Writer provided non-verbal encouragement to patient.     Response:  Patient expressed enthusiasm in attending group and waiting to watch a movie until afterwards. Behavior in group was anxious- he had difficulty focusing, but he was willing to try to participate. Patient engaged in dynamic stretching while listening to the recording. He began wandering around the unit. [facility] RN offered Zyprexa to him which he declined but was willing to take hydroxyzine. Patient took medication and came back to the group with writer and RN's prompting, but he left group again and appeared to be becoming more anxious. Patient received support from RN, and writer helped them start the movie for some helpful distraction.     Ila Do

## 2023-10-19 NOTE — ED PROVIDER NOTES
[facility] ED Mental Health Handoff Note:       Brief HPI:  This is a 37 year old male signed out to me by the previous provider.  See initial ED Provider note for full details of the presentation.   Interval history is pertinent for no significant events.    Home meds reviewed and ordered/administered: Yes    Medically stable for inpatient mental health admission: Yes    Evaluated by mental health: Yes    Safety concerns: At the time I received sign out, there were no safety concerns.    Hold Status:  Active Orders   N/A       Exam:   Patient Vitals for the past 24 hrs:   BP Temp Temp src Pulse Resp SpO2   10/18/23 2021 109/64 -- -- 56 -- --   10/18/23 1638 (!) 154/90 98.4  F (36.9  C) Oral 62 18 97 %   10/18/23 1103 (!) 152/96 98.5  F (36.9  C) Oral 58 16 100 %       GEN: resting in bed, calm  PULM: breathing comfortably, in no respiratory distress  PSYCH: Calm and cooperative, interactive    ED Course:    Medications   nicotine (NICORETTE) gum 2 mg (2 mg Buccal $Given 10/19/23 0813)   hydrOXYzine (ATARAX) tablet 25 mg (25 mg Oral $Given 10/18/23 1751)   OLANZapine zydis (zyPREXA) ODT tab 5 mg (5 mg Oral $Given 10/18/23 1924)   ibuprofen (ADVIL/MOTRIN) tablet 600 mg (has no administration in time range)   buprenorphine HCl-naloxone HCl (SUBOXONE) 4-1 MG per film 1 Film (has no administration in time range)     And   buprenorphine HCl-naloxone HCl (SUBOXONE) 8-2 MG per film 1 Film (has no administration in time range)   ibuprofen (ADVIL/MOTRIN) tablet 600 mg (600 mg Oral $Given 10/18/23 1524)            Patient was signed out to the oncoming provider.      Impression:    ICD-10-CM    1. Anxiety  F41.9       2. Paranoia (H)  F22           Plan:    Awaiting inpatient mental health admission/transfer.      RESULTS:   Results for orders placed or performed during the hospital encounter of 10/18/23 (from the past 24 hour(s))   [facility] (DEC) Assessment Consult Order:     Status: None ()     Collection Time: 10/18/23 11:13 AM    Narrative    Zoe Leary     10/18/2023  4:43 PM  DEC Consult Order placed. DEC assessment completed by Fco Lombardo on 10/18/23 at 2:13 PM. Consult acknowledged and   completed.     Zoe Leary    Urine Drug Screen     Status: Abnormal    Collection Time: 10/18/23 11:46 AM    Narrative    The following orders were created for panel order Urine Drug Screen.  Procedure                               Abnormality         Status                     ---------                               -----------         ------                     Urine Drug Screen Panel[[id number]]      Abnormal            Final result                 Please view results for these tests on the individual orders.   Urine Drug Screen Panel     Status: Abnormal    Collection Time: 10/18/23 11:46 AM   Result Value Ref Range    Amphetamines Urine Screen Negative Screen Negative    Barbituates Urine Screen Negative Screen Negative    Benzodiazepine Urine Screen Negative Screen Negative    Cannabinoids Urine Screen Positive (A) Screen Negative    Cocaine Urine Screen Negative Screen Negative    Fentanyl Qual Urine Screen Negative Screen Negative    Opiates Urine Screen Negative Screen Negative    PCP Urine Screen Negative Screen Negative       MD Katerina Evans Ashley, MD  10/19/23 0822

## 2023-10-19 NOTE — ED NOTES
Took over care of patient at 12:30 pm.   Patient has high energy, pacing area.  Patient responding to internal stimuli.  Patient denies SI, HI, and states at times he hears voices.  Patient cooperative, and interacting appropriately with other patients.  Played Wii with other patients.  Patient spoke with mother twice on phone and spoke with therapist.

## 2023-10-19 NOTE — ED NOTES
IP MH Referral Acuity Rating Score (RARS)    LMHP complete at referral to IP MH, with DEC; and, daily while awaiting IP MH placement. Call score to PPS.  CRITERIA SCORING   New 71 HH and Involuntary for IP MH (not adolescent) 0/1   Boarding over 24 hours 1/1   Vulnerable adult at least 55+ with multiple co morbidities; or, Patient age 11 or under 0/1   Suicide ideation without relief of precipitating factors 0/1   Current plan for suicide 0/1   Current plan for homicide 0/1   Imminent risk or actual attempt to seriously harm another without relief of factors precipitating the attempt 0/1   Severe dysfunction in daily living (ex: complete neglect for self care, extreme disruption in vegetative function, extreme deterioration in social interactions) 1/1   Recent (last 2 weeks) or current physical aggression in the ED 0/1   Restraints or seclusion episode in ED 0/1   Verbal aggression, agitation, yelling, etc., while in the ED 0/1   Active psychosis with psychomotor agitation or catatonia 1/1   Need for constant or near constant redirection (from leaving, from others, etc).  1/1   Intrusive or disruptive behaviors 0/1   TOTAL Acuity Total Score: 4

## 2023-10-19 NOTE — ED PROVIDER NOTES
[facility] ED Mental Health Handoff Note:       Brief HPI:  This is a 37 year old male signed out to me.  See initial ED Provider note for full details of the presentation.     Home meds reviewed and ordered/administered: Yes    Medically stable for inpatient mental health admission: Yes.    Evaluated by mental health: Yes. The recommendation is for inpatient mental health treatment. Bed search in process    Safety concerns: At the time I received sign out, there were no safety concerns.    Hold Status:  Active Orders   N/A       Exam:   Patient Vitals for the past 24 hrs:   BP Temp Temp src Pulse Resp SpO2   10/18/23 2021 109/64 -- -- 56 -- --   10/18/23 1638 (!) 154/90 98.4  F (36.9  C) Oral 62 18 97 %         ED Course:    Medications   nicotine (NICORETTE) gum 2 mg (2 mg Buccal $Given 10/19/23 1232)   hydrOXYzine (ATARAX) tablet 25 mg (25 mg Oral $Given 10/18/23 1751)   OLANZapine zydis (zyPREXA) ODT tab 5 mg (5 mg Oral $Given 10/18/23 1924)   ibuprofen (ADVIL/MOTRIN) tablet 600 mg (has no administration in time range)   buprenorphine HCl-naloxone HCl (SUBOXONE) 4-1 MG per film 1 Film (1 Film Sublingual $Given 10/19/23 1002)     And   buprenorphine HCl-naloxone HCl (SUBOXONE) 8-2 MG per film 1 Film (1 Film Sublingual $Given 10/19/23 1002)   hydrOXYzine (VISTARIL) capsule 50 mg (has no administration in time range)   guanFACINE (INTUNIV) 24 hr tablet 1 mg (has no administration in time range)   losartan (COZAAR) tablet 25 mg (has no administration in time range)   multivitamin w/minerals (THERA-VIT-M) tablet 1 tablet (has no administration in time range)   polyethylene glycol (MIRALAX) Packet 17 g (has no administration in time range)   QUEtiapine (SEROquel) tablet 25 mg (has no administration in time range)   senna-docusate (SENOKOT-S/PERICOLACE) 8.6-50 MG per tablet 1 tablet (has no administration in time range)   gabapentin (NEURONTIN) capsule 100-200 mg (has no administration in time range)   ibuprofen  (ADVIL/MOTRIN) tablet 600 mg (600 mg Oral $Given 10/18/23 1526)            There were no significant events during my shift.    Patient was signed out to the oncoming provider.       Impression:    ICD-10-CM    1. Anxiety  F41.9       2. Paranoia (H)  F22           Plan:    Awaiting inpatient mental health admission/transfer.      RESULTS:   No results found for this visit on 10/18/23 (from the past 24 hour(s)).          MD Solomon Eagle Cara, MD  10/19/23 9975

## 2023-10-19 NOTE — ED NOTES
Patent was asleep at 2045 and has remained asleep . No behavioral concerns at this time. Will continue to monitor.

## 2023-10-19 NOTE — ED NOTES
Patient's BP was elevated. Writer called and spoke to the ED doctor and he was going to look at his home medications . Still waiting to hear from the doctor. Staff will continue to monitor patient. Patient ate 100% of his dinner. Prn hydroxyzine was administered earlier but it did not seem to be effective as patient kept pacing in the extended milieu area. Writer had offered prn Zyprexa earlier  but patient refused.  When asked to rate his anxiety, patient said he was not anxious. He just wants to be able to sit calmly.  Pt Continued pacing and arranging all the DVDs on the floor.  Writer offered 5 mg of Zyprexa and patient accepted to the medication. No aggressive behavior noted at this time. Staff continues to monitor and provide support.

## 2023-10-19 NOTE — PROGRESS NOTES
Triage & Transition Services, Extended Care    Client Name: Markus Mosley    Date: October 19, 2023  Service Type:  Group Therapy  Session Start Time:  1130    Session End Time: 1200  Session Length: 30  Site Location: [facility]  Attendees: Patient and other group members  Facilitator: ZORAN Kong     Topic:   Value Card Sort    Intervention:    Group process: support, challenge, affirm, psycho-education.     Response:  Patient did not participate in group.        ZORAN Kong

## 2023-10-19 NOTE — ED NOTES
Patient was awake the most of the whole shift.Patient was calm and cooperative .No behaviors noted.Will continue to monitor.

## 2023-10-19 NOTE — PROGRESS NOTES
"Triage & Transition Services, Extended Care      Client Name: Markus Mosley \"Markus\"   Date: October 18, 2023  Service Type:  Group Therapy  Site Location: [facility]  Facilitator: Ila Do     Topic:   Paint by Sticker     Patient was in the milieu resting and did not participate in group.     Ila Do  Extended Care Coordinator  "

## 2023-10-20 ENCOUNTER — TELEPHONE (OUTPATIENT)
Dept: BEHAVIORAL HEALTH | Facility: CLINIC | Age: 38
End: 2023-10-20
Payer: COMMERCIAL

## 2023-10-20 PROBLEM — F29 PSYCHOSIS (H): Status: ACTIVE | Noted: 2023-10-20

## 2023-10-20 PROCEDURE — 250N000013 HC RX MED GY IP 250 OP 250 PS 637: Performed by: PSYCHIATRY & NEUROLOGY

## 2023-10-20 PROCEDURE — 250N000013 HC RX MED GY IP 250 OP 250 PS 637: Performed by: EMERGENCY MEDICINE

## 2023-10-20 PROCEDURE — 250N000013 HC RX MED GY IP 250 OP 250 PS 637: Performed by: STUDENT IN AN ORGANIZED HEALTH CARE EDUCATION/TRAINING PROGRAM

## 2023-10-20 PROCEDURE — 124N000002 HC R&B MH UMMC

## 2023-10-20 RX ORDER — OLANZAPINE 10 MG/1
10 TABLET ORAL 3 TIMES DAILY PRN
Status: DISCONTINUED | OUTPATIENT
Start: 2023-10-20 | End: 2023-10-26 | Stop reason: HOSPADM

## 2023-10-20 RX ORDER — HYDROXYZINE HYDROCHLORIDE 25 MG/1
25 TABLET, FILM COATED ORAL EVERY 4 HOURS PRN
Status: DISCONTINUED | OUTPATIENT
Start: 2023-10-20 | End: 2023-10-26 | Stop reason: HOSPADM

## 2023-10-20 RX ORDER — OLANZAPINE 10 MG/2ML
10 INJECTION, POWDER, FOR SOLUTION INTRAMUSCULAR 3 TIMES DAILY PRN
Status: DISCONTINUED | OUTPATIENT
Start: 2023-10-20 | End: 2023-10-26 | Stop reason: HOSPADM

## 2023-10-20 RX ORDER — MAGNESIUM HYDROXIDE/ALUMINUM HYDROXICE/SIMETHICONE 120; 1200; 1200 MG/30ML; MG/30ML; MG/30ML
30 SUSPENSION ORAL EVERY 4 HOURS PRN
Status: DISCONTINUED | OUTPATIENT
Start: 2023-10-20 | End: 2023-10-26 | Stop reason: HOSPADM

## 2023-10-20 RX ORDER — ACETAMINOPHEN 325 MG/1
650 TABLET ORAL EVERY 4 HOURS PRN
Status: DISCONTINUED | OUTPATIENT
Start: 2023-10-20 | End: 2023-10-26 | Stop reason: HOSPADM

## 2023-10-20 RX ORDER — POLYETHYLENE GLYCOL 3350 17 G/17G
17 POWDER, FOR SOLUTION ORAL DAILY PRN
Status: DISCONTINUED | OUTPATIENT
Start: 2023-10-20 | End: 2023-10-26 | Stop reason: HOSPADM

## 2023-10-20 RX ADMIN — NICOTINE POLACRILEX 2 MG: 2 GUM, CHEWING ORAL at 09:20

## 2023-10-20 RX ADMIN — NICOTINE POLACRILEX 2 MG: 2 GUM, CHEWING ORAL at 18:34

## 2023-10-20 RX ADMIN — LOSARTAN POTASSIUM 25 MG: 25 TABLET, FILM COATED ORAL at 08:03

## 2023-10-20 RX ADMIN — BUPRENORPHINE AND NALOXONE 1 FILM: 8; 2 FILM, SOLUBLE BUCCAL; SUBLINGUAL at 08:04

## 2023-10-20 RX ADMIN — POLYETHYLENE GLYCOL 3350 17 G: 17 POWDER, FOR SOLUTION ORAL at 08:03

## 2023-10-20 RX ADMIN — BUPRENORPHINE AND NALOXONE 1 FILM: 4; 1 FILM, SOLUBLE BUCCAL; SUBLINGUAL at 08:04

## 2023-10-20 RX ADMIN — GUANFACINE 1 MG: 1 TABLET, EXTENDED RELEASE ORAL at 20:07

## 2023-10-20 RX ADMIN — OLANZAPINE 5 MG: 5 TABLET, ORALLY DISINTEGRATING ORAL at 20:08

## 2023-10-20 RX ADMIN — NICOTINE POLACRILEX 2 MG: 2 GUM, CHEWING ORAL at 05:48

## 2023-10-20 RX ADMIN — NICOTINE POLACRILEX 2 MG: 2 GUM, CHEWING ORAL at 14:44

## 2023-10-20 RX ADMIN — SENNOSIDES AND DOCUSATE SODIUM 1 TABLET: 50; 8.6 TABLET ORAL at 08:04

## 2023-10-20 RX ADMIN — MULTIPLE VITAMINS W/ MINERALS TAB 1 TABLET: TAB at 08:03

## 2023-10-20 RX ADMIN — IBUPROFEN 600 MG: 600 TABLET, FILM COATED ORAL at 19:18

## 2023-10-20 ASSESSMENT — ACTIVITIES OF DAILY LIVING (ADL)
ADLS_ACUITY_SCORE: 37
ADLS_ACUITY_SCORE: 40
ADLS_ACUITY_SCORE: 37
ADLS_ACUITY_SCORE: 37

## 2023-10-20 NOTE — PROGRESS NOTES
Patient is pleasant and cooperative with care and staff. Took medications accordingly. Pt contracted for safety. Denied pain, HI/SI and all psych symptoms. Safety check completed every 15 minutes.  Will continue to monitor pt.

## 2023-10-20 NOTE — PROGRESS NOTES
Triage & Transition Services, Extended Care    Client Name: Markus Mosley    Date: October 20, 2023  Service Type:  Group Therapy  Session Start Time:  3:00 pm    Session End Time: 3:30 pm  Session Length: 30  Site Location: [facility]  Attendees: Patient and other group members  Facilitator: Yael Martini     Topic:   Collaging    Intervention:    Group process: support, challenge, affirm, psycho-education.     Response:  Patient did participate in group. Behavior in group was appropriate and engaged. Patient shared made a collage and included inspirational phrases and words.       Yael Martini

## 2023-10-20 NOTE — ED NOTES
Pt noted pacing around the milieu the entire afternoon. Pt denies any SI,HI,SIB. Pt appears to be responding to internal stimuli. Pt was medication compliant and was able to contract for safety. Pt parents came to visit and they had some concerns they wanted to share AILYN obtained. Parents voiced concerns to the writer about not wanting the pts partner to contact the pt. Writer explained to pt parents the [facility] policies & that pt has a right to phone calls. Pt parents are concerned because the pt partner has convinced him in the past to leave treatment etc and has called the police on the pt multiple times. Otherwise pt was behaviorally in control this afternoon.
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
## 2023-10-20 NOTE — TELEPHONE ENCOUNTER
Pt requests [facility], operated by [facility] or [place] only .    S: [place]  Access Inpatient Bed Call Log 10/20/23 8:35 AM   Intake has called facilities that have not updated the bed status within the last 12 hours.                               [facility] is at capacity.              [facility] is posting 0 beds. [phone].    [facility] is posting 0 beds. Negative covid required.                 [facility] is posting 0 beds. Negative covid required. [phone] Beds available, check back around 9:30am  [facility] is posting 0 bed. [phone]   [facility] is posting 0 beds. [phone].    [facility] is posting 2 beds Call for details. Negative covid.  [phone]. Per call @8:04am, they have a few beds available.  [facility] is posting 0 beds           [facility] ([facility]) is posting 0 beds.   [facility] ([place]) is posting 0 beds. Low acuity only. Negative covid.  [phone].         [facility] ([place]) is posting 0 bed available. Negative covid.  [phone].           Continue to seek appropriate placement.

## 2023-10-20 NOTE — ED NOTES
Met with patient at 0005.  States no current negative thoughts, SI/HI, or plans.  Patient mentioned hearing a 'buzzing' noise within the past hour and then states his feet began to hurt, but was unable to describe pain or location.  States he is hungry, PA provided snack.  Calm and cooperative, friendly to this writer.  No current needs at this time.    0300 Patient continues to rest with eyes closed.  0640 Patient awake, asking for morning meds.  Advised that it is too early, patient accepting and understanding.  Asked for his hat, reinforced unit guidelines that unable to provide hat.  Agreeable.  Nursing to continue to monitor.
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
## 2023-10-20 NOTE — ED PROVIDER NOTES
[facility] ED Mental Health Handoff Note:       Brief HPI:  This is a 37 year old male signed out to me.  See initial ED Provider note for full details of the presentation. Interval history is pertinent for ongoing ED boarding.    Home meds reviewed and ordered/administered: Yes    Medically stable for inpatient mental health admission: Yes.    Evaluated by mental health: Yes. The recommendation is for inpatient mental health treatment. Bed search in process    Safety concerns: At the time I received sign out, there were no safety concerns.    Hold Status:  Active Orders   N/A       Exam:   Patient Vitals for the past 24 hrs:   BP Temp Temp src Pulse Resp SpO2   10/20/23 0808 123/78 98.1  F (36.7  C) -- 61 -- 99 %   10/19/23 2000 136/82 98.1  F (36.7  C) Oral 71 18 99 %       ED Course:    Medications   nicotine (NICORETTE) gum 2 mg (2 mg Buccal $Given 10/20/23 0920)   hydrOXYzine (ATARAX) tablet 25 mg (25 mg Oral $Given 10/18/23 1751)   OLANZapine zydis (zyPREXA) ODT tab 5 mg (5 mg Oral $Given 10/19/23 1918)   ibuprofen (ADVIL/MOTRIN) tablet 600 mg (0 mg Oral Return to Cabinet 10/20/23 0008)   buprenorphine HCl-naloxone HCl (SUBOXONE) 4-1 MG per film 1 Film (1 Film Sublingual $Given 10/20/23 0804)     And   buprenorphine HCl-naloxone HCl (SUBOXONE) 8-2 MG per film 1 Film (1 Film Sublingual $Given 10/20/23 0804)   guanFACINE (INTUNIV) 24 hr tablet 1 mg ( Oral Canceled Entry 10/19/23 2200)   losartan (COZAAR) tablet 25 mg (25 mg Oral $Given 10/20/23 0803)   multivitamin w/minerals (THERA-VIT-M) tablet 1 tablet (1 tablet Oral $Given 10/20/23 0803)   polyethylene glycol (MIRALAX) Packet 17 g (17 g Oral $Given 10/20/23 0803)   QUEtiapine (SEROquel) tablet 25 mg (25 mg Oral $Given 10/19/23 1918)   senna-docusate (SENOKOT-S/PERICOLACE) 8.6-50 MG per tablet 1 tablet (1 tablet Oral $Given 10/20/23 1004)   gabapentin (NEURONTIN) capsule 100-200 mg (has no administration in time range)   ibuprofen (ADVIL/MOTRIN) tablet  600 mg (600 mg Oral $Given 10/18/23 4869)            There were no significant events during my shift.      Impression:    ICD-10-CM    1. Anxiety  F41.9       2. Paranoia (H)  F22           Plan:    Awaiting mental health evaluation/recommendations.      RESULTS:   Results for orders placed or performed during the hospital encounter of 10/18/23 (from the past 24 hour(s))   Asymptomatic COVID-19 Virus (Coronavirus) by PCR Nasopharyngeal     Status: Normal    Collection Time: 10/19/23  9:35 PM    Specimen: Nasopharyngeal; Swab   Result Value Ref Range    SARS CoV2 PCR Negative Negative    Narrative    Testing was performed using the Xpert Xpress SARS-CoV-2 Assay on the Cepheid Gene-Xpert Instrument Systems. Additional information about this Emergency Use Authorization (EUA) assay can be found via the Lab Guide. This test should be ordered for the detection of SARS-CoV-2 in individuals who meet SARS-CoV-2 clinical and/or epidemiological criteria as well as from individuals without symptoms or other reasons to suspect COVID-19. Test performance for asymptomatic patients has only been established in anterior nasal swab specimens. This test is for in vitro diagnostic use under the FDA EUA for laboratories certified under CLIA to perform high complexity testing. This test has not been FDA cleared or approved. A negative result does not rule out the presence of PCR inhibitors in the specimen or target RNA concentration below the limit of detection for the assay. The possibility of a false negative should be considered if the patient's recent exposure or clinical presentation suggests COVID-19. This test was validated by the [facility]. This laboratory is certified under the Clinical Laboratory Improvement Amendments (CLIA) as qualified to perform high complexity laboratory testing.               MD Rico Mendez, Jose Cornejo MD  10/20/23 2739

## 2023-10-20 NOTE — PROGRESS NOTES
Triage & Transition Services, Extended Care    Client Name: Markus Mosley    Date: October 20, 2023  Service Type:  Group Therapy  Session Start Time: 11:10am    Session End Time: 11:40am  Session Length: 30 minutes  Site Location: [facility]  Attendees: Patient and other group members  Facilitator: JOAQUIN Ramirez     Topic:   DBT skills: STOP and Check the Facts    Intervention:    Group process: support, challenge, affirm, psycho-education.     Response:  Patient did participate in group. Behavior in group was appropriate. Patient was insightful and engaged.       JOAQUIN Ramirez
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
## 2023-10-20 NOTE — TELEPHONE ENCOUNTER
R:   Admit to station 22     accepts for himself   Vol    - 3:38  22 [facility]N informed  -  3:40  [facility] informed

## 2023-10-21 LAB
ALBUMIN SERPL BCG-MCNC: 4.9 G/DL (ref 3.5–5.2)
ALP SERPL-CCNC: 61 U/L (ref 40–129)
ALT SERPL W P-5'-P-CCNC: 22 U/L (ref 0–70)
ANION GAP SERPL CALCULATED.3IONS-SCNC: 11 MMOL/L (ref 7–15)
AST SERPL W P-5'-P-CCNC: 18 U/L (ref 0–45)
BASO+EOS+MONOS # BLD AUTO: NORMAL 10*3/UL
BASO+EOS+MONOS NFR BLD AUTO: NORMAL %
BASOPHILS # BLD AUTO: 0.1 10E3/UL (ref 0–0.2)
BASOPHILS NFR BLD AUTO: 1 %
BILIRUB SERPL-MCNC: 0.2 MG/DL
BUN SERPL-MCNC: 17.5 MG/DL (ref 6–20)
CALCIUM SERPL-MCNC: 9.7 MG/DL (ref 8.6–10)
CHLORIDE SERPL-SCNC: 104 MMOL/L (ref 98–107)
CHOLEST SERPL-MCNC: 159 MG/DL
CK SERPL-CCNC: 234 U/L (ref 39–308)
CREAT SERPL-MCNC: 0.79 MG/DL (ref 0.67–1.17)
DEPRECATED HCO3 PLAS-SCNC: 27 MMOL/L (ref 22–29)
EGFRCR SERPLBLD CKD-EPI 2021: >90 ML/MIN/1.73M2
EOSINOPHIL # BLD AUTO: 0.3 10E3/UL (ref 0–0.7)
EOSINOPHIL NFR BLD AUTO: 3 %
ERYTHROCYTE [DISTWIDTH] IN BLOOD BY AUTOMATED COUNT: 12.5 % (ref 10–15)
FOLATE SERPL-MCNC: 15.3 NG/ML (ref 4.6–34.8)
GLUCOSE SERPL-MCNC: 102 MG/DL (ref 70–99)
HBA1C MFR BLD: 5.4 %
HCT VFR BLD AUTO: 45.4 % (ref 40–53)
HDLC SERPL-MCNC: 44 MG/DL
HGB BLD-MCNC: 14.8 G/DL (ref 13.3–17.7)
IMM GRANULOCYTES # BLD: 0 10E3/UL
IMM GRANULOCYTES NFR BLD: 0 %
LDLC SERPL CALC-MCNC: 76 MG/DL
LYMPHOCYTES # BLD AUTO: 2.5 10E3/UL (ref 0.8–5.3)
LYMPHOCYTES NFR BLD AUTO: 27 %
MCH RBC QN AUTO: 28.8 PG (ref 26.5–33)
MCHC RBC AUTO-ENTMCNC: 32.6 G/DL (ref 31.5–36.5)
MCV RBC AUTO: 88 FL (ref 78–100)
MONOCYTES # BLD AUTO: 0.8 10E3/UL (ref 0–1.3)
MONOCYTES NFR BLD AUTO: 9 %
NEUTROPHILS # BLD AUTO: 5.5 10E3/UL (ref 1.6–8.3)
NEUTROPHILS NFR BLD AUTO: 60 %
NONHDLC SERPL-MCNC: 115 MG/DL
NRBC # BLD AUTO: 0 10E3/UL
NRBC BLD AUTO-RTO: 0 /100
PLATELET # BLD AUTO: 302 10E3/UL (ref 150–450)
POTASSIUM SERPL-SCNC: 4.3 MMOL/L (ref 3.4–5.3)
PROT SERPL-MCNC: 7.1 G/DL (ref 6.4–8.3)
RBC # BLD AUTO: 5.14 10E6/UL (ref 4.4–5.9)
SODIUM SERPL-SCNC: 142 MMOL/L (ref 135–145)
TRIGL SERPL-MCNC: 193 MG/DL
TSH SERPL DL<=0.005 MIU/L-ACNC: 3.74 UIU/ML (ref 0.3–4.2)
VIT B12 SERPL-MCNC: 520 PG/ML (ref 232–1245)
VIT D+METAB SERPL-MCNC: 28 NG/ML (ref 20–50)
WBC # BLD AUTO: 9.3 10E3/UL (ref 4–11)

## 2023-10-21 PROCEDURE — 124N000002 HC R&B MH UMMC

## 2023-10-21 PROCEDURE — 99223 1ST HOSP IP/OBS HIGH 75: CPT | Mod: AI | Performed by: PSYCHIATRY & NEUROLOGY

## 2023-10-21 PROCEDURE — 250N000013 HC RX MED GY IP 250 OP 250 PS 637

## 2023-10-21 PROCEDURE — 80061 LIPID PANEL: CPT

## 2023-10-21 PROCEDURE — 83036 HEMOGLOBIN GLYCOSYLATED A1C: CPT

## 2023-10-21 PROCEDURE — 36415 COLL VENOUS BLD VENIPUNCTURE: CPT

## 2023-10-21 PROCEDURE — 82746 ASSAY OF FOLIC ACID SERUM: CPT

## 2023-10-21 PROCEDURE — 82550 ASSAY OF CK (CPK): CPT

## 2023-10-21 PROCEDURE — 80053 COMPREHEN METABOLIC PANEL: CPT

## 2023-10-21 PROCEDURE — 250N000013 HC RX MED GY IP 250 OP 250 PS 637: Performed by: STUDENT IN AN ORGANIZED HEALTH CARE EDUCATION/TRAINING PROGRAM

## 2023-10-21 PROCEDURE — 85025 COMPLETE CBC W/AUTO DIFF WBC: CPT

## 2023-10-21 PROCEDURE — 82607 VITAMIN B-12: CPT

## 2023-10-21 PROCEDURE — 84443 ASSAY THYROID STIM HORMONE: CPT

## 2023-10-21 PROCEDURE — 82306 VITAMIN D 25 HYDROXY: CPT

## 2023-10-21 RX ORDER — POLYETHYLENE GLYCOL 3350 17 G
2 POWDER IN PACKET (EA) ORAL
Status: DISCONTINUED | OUTPATIENT
Start: 2023-10-21 | End: 2023-10-26 | Stop reason: HOSPADM

## 2023-10-21 RX ORDER — OLANZAPINE 5 MG/1
5 TABLET ORAL AT BEDTIME
Status: DISCONTINUED | OUTPATIENT
Start: 2023-10-21 | End: 2023-10-21

## 2023-10-21 RX ORDER — LURASIDONE HYDROCHLORIDE 20 MG/1
20 TABLET, FILM COATED ORAL DAILY
Status: DISCONTINUED | OUTPATIENT
Start: 2023-10-21 | End: 2023-10-23

## 2023-10-21 RX ADMIN — HYDROXYZINE HYDROCHLORIDE 25 MG: 25 TABLET, FILM COATED ORAL at 16:10

## 2023-10-21 RX ADMIN — BUPRENORPHINE AND NALOXONE 1 FILM: 4; 1 FILM, SOLUBLE BUCCAL; SUBLINGUAL at 08:34

## 2023-10-21 RX ADMIN — NICOTINE POLACRILEX 2 MG: 2 LOZENGE ORAL at 19:33

## 2023-10-21 RX ADMIN — BUPRENORPHINE AND NALOXONE 1 FILM: 8; 2 FILM, SOLUBLE BUCCAL; SUBLINGUAL at 08:34

## 2023-10-21 RX ADMIN — Medication 50 MG: at 21:52

## 2023-10-21 RX ADMIN — NICOTINE POLACRILEX 2 MG: 2 LOZENGE ORAL at 18:48

## 2023-10-21 RX ADMIN — ACETAMINOPHEN 650 MG: 325 TABLET, FILM COATED ORAL at 12:14

## 2023-10-21 RX ADMIN — MULTIPLE VITAMINS W/ MINERALS TAB 1 TABLET: TAB at 08:32

## 2023-10-21 RX ADMIN — NICOTINE POLACRILEX 2 MG: 2 LOZENGE ORAL at 20:38

## 2023-10-21 RX ADMIN — LOSARTAN POTASSIUM 25 MG: 25 TABLET, FILM COATED ORAL at 08:32

## 2023-10-21 RX ADMIN — NICOTINE POLACRILEX 2 MG: 2 GUM, CHEWING ORAL at 16:13

## 2023-10-21 RX ADMIN — NICOTINE POLACRILEX 2 MG: 2 GUM, CHEWING ORAL at 09:23

## 2023-10-21 RX ADMIN — NICOTINE POLACRILEX 2 MG: 2 LOZENGE ORAL at 17:08

## 2023-10-21 RX ADMIN — OLANZAPINE 10 MG: 10 TABLET, FILM COATED ORAL at 21:00

## 2023-10-21 RX ADMIN — LURASIDONE HYDROCHLORIDE 20 MG: 20 TABLET, FILM COATED ORAL at 16:10

## 2023-10-21 RX ADMIN — NICOTINE POLACRILEX 2 MG: 2 GUM, CHEWING ORAL at 10:43

## 2023-10-21 RX ADMIN — GUANFACINE 1 MG: 1 TABLET, EXTENDED RELEASE ORAL at 19:30

## 2023-10-21 ASSESSMENT — ACTIVITIES OF DAILY LIVING (ADL)
ADLS_ACUITY_SCORE: 40
ORAL_HYGIENE: INDEPENDENT
ORAL_HYGIENE: INDEPENDENT
LAUNDRY: WITH SUPERVISION
HYGIENE/GROOMING: INDEPENDENT
ADLS_ACUITY_SCORE: 40
ADLS_ACUITY_SCORE: 40
HYGIENE/GROOMING: INDEPENDENT
ADLS_ACUITY_SCORE: 40
ADLS_ACUITY_SCORE: 40
DRESS: INDEPENDENT
DRESS: INDEPENDENT
LAUNDRY: UNABLE TO COMPLETE
ADLS_ACUITY_SCORE: 40

## 2023-10-21 NOTE — ED NOTES
"This writer received a call from pt's mother Tamera Mosley at [phone]. Tamera was visiting pt at [facility] today and received a phone call from him when she returned home. He told her that hospital staff are performing experiments on him to make his feet grow. He believes that hospital staff are going to take him to the basement and tie his legs to a chair. He also told his mother that he is excited to move to an inpatient unit where there are not \"dangerous people\" like there are on [facility]. This writer relayed this information to pt's attending provider.    Yaneth Wang, JOHANNSW  "

## 2023-10-21 NOTE — PROGRESS NOTES
"SITUATION:  37 yrs. old male admitted to station 22 under  for Anxiety. Pt is voluntary.    BACKGROUND  Pt most recent  inpatient admission was at [facility](did not recall date) per pt. Past medical history include anxiety, depression, substance abuse, alcohol, opiates, THC and HTN.   Pt was presented to the ED with complaint of increased anxiety levels and making it hard to function on a daily basis or go anywhere per pt report. Pt states he has had anxiety in the past but this time it is much more significant. Pt reports over the past two weeks he has had significant increased anxiety level and feels that he is being constantly triggered by sounds and noises. Pt said that he has been wearing headphone to help combat the noises that have been triggering his anxiety.      Assessment:  Behavior upon arrival: Pt calm, polite and cooperative with safety search. Admission interview was completed without any difficulties. Pt's affect is anxious. Pt has some insight into his admission, he states he is being admitted because of the  \"anxiety and noise\" pt said his anxiety has being getting worse. Presently, pt denies SI/HI/SIB/AVH. Pt is contracts for safety on the unit. Pt denies use of alcohol or any substance in the last six month \"I'm try to stay clean. That's why I came to the hospital.\"  Pt provided with folder, oriented to unit, and questions/concerns were addressed. Pt signed AILYN for his mother Betty Corbett.     Blood pressure 123/78, pulse 72, temperature 97.5  F (36.4  C), temperature source Oral, resp. rate 16, height 1.727 m (5' 8\"), weight 79.3 kg (174 lb 14.4 oz), SpO2 99%.     Plan: Assist pt with finding healthy  coping skills and setting daily goals, encouraging medication adherence and monitor for side effect, build rapport , begin status 15 minutes checks.    "

## 2023-10-21 NOTE — PLAN OF CARE
"Goal Outcome Evaluation:    Plan of Care Reviewed With: patient      Problem: Psychotic Signs/Symptoms  Goal: Improved Psychomotor Symptoms (Psychotic Signs/Symptoms)  Outcome: Progressing  Note: Pt presented with a restless, anxious affect. When questioned if he was anxious, pt responded, \"No, I'm not anxious. I don't like what you guys give for anxiety...sometimes, they put anxiety meds in my food.\" Writer reassured pt that would not happen here and pt responded, \"I don't believe that.\" Pt was easily distracted with tangential speech and disorganized thought process. Appeared internally preoccupied. Pt stated he wanted to leave because he needed a hair cut and wanted to wear his hat on the unit then walked down the swenson and stated, \"I want to teach something\" and proceeded to write on the [employer] board. Pt paced the swenson with headphones and visited with his mother  during the shift. Stated to his mother, \"there's holy water here.\" Received tylenol for back and neck pain.        "

## 2023-10-21 NOTE — PLAN OF CARE
Pt appeared sleeping  for 7 hour without distress  15 minutes safety checks completed throughout the night and no issues identified  No discomfort or pain verbalized this shift  No precautionary behavior noted or reported       Problem: Sleep Disturbance  Goal: Adequate Sleep/Rest  Outcome: Progressing     Problem: Adult Behavioral Health Plan of Care  Goal: Adheres to Safety Considerations for Self and Others  Intervention: Develop and Maintain Individualized Safety Plan  Recent Flowsheet Documentation  Taken 10/21/2023 0628 by Loreta Guevara, RN  Safety Measures: safety rounds completed  Goal: Absence of New-Onset Illness or Injury  Intervention: Identify and Manage Fall Risk  Recent Flowsheet Documentation  Taken 10/21/2023 0628 by Loreta Guevara, RN  Safety Measures: safety rounds completed   Goal Outcome Evaluation:
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
## 2023-10-21 NOTE — CARE PLAN
10/20/23 2413   Patient Belongings   Did you bring any home meds/supplements to the hospital?  Yes   Disposition of meds  Sent to security/pharmacy per site process   Patient Belongings locker   Patient Belongings Put in Hospital Secure Location (Security or Locker, etc.) clothing;shoes;cell phone/electronics;other (see comments);Jain item   Belongings Search Yes   Clothing Search Yes   Second Staff Grey AGOSTO/Kelly BLEVINS               1 baseball hat, 1 pair white nike air force ones, 1 pair wireless headphones, 1 pair black sandals, 1 wallet, 1 iphone (undamaged), 1 vape device, 1 pair underwear, 2 long sleeve shirts, 1 t shirt, 1 pair white sweat pants with string. 1 small bottle holy water, 1 lighter, 1 deodorant, 1 pair of socks, 1 rosary, and 1 piece of joanie     1 thc vape pen(in locker, not sent to security)  Social security card sent to security      Admission:  I am responsible for any personal items that are not sent to the safe or pharmacy.  [facility] is not responsible for loss, theft or damage of any property in my possession.    Signature:  _________________________________ Date: _______  Time: _____                                              Staff Signature:  ____________________________ Date: ________  Time: _____      2nd Staff person, if patient is unable/unwilling to sign:    Signature: ________________________________ Date: ________  Time: _____     Discharge:  [facility] has returned all of my personal belongings:    Signature: _________________________________ Date: ________  Time: _____                                          Staff Signature:  ____________________________ Date: ________  Time: _____

## 2023-10-22 PROCEDURE — H2032 ACTIVITY THERAPY, PER 15 MIN: HCPCS

## 2023-10-22 PROCEDURE — 250N000013 HC RX MED GY IP 250 OP 250 PS 637

## 2023-10-22 PROCEDURE — 250N000013 HC RX MED GY IP 250 OP 250 PS 637: Performed by: STUDENT IN AN ORGANIZED HEALTH CARE EDUCATION/TRAINING PROGRAM

## 2023-10-22 PROCEDURE — 124N000002 HC R&B MH UMMC

## 2023-10-22 RX ADMIN — NICOTINE POLACRILEX 2 MG: 2 LOZENGE ORAL at 18:45

## 2023-10-22 RX ADMIN — NICOTINE POLACRILEX 2 MG: 2 LOZENGE ORAL at 12:18

## 2023-10-22 RX ADMIN — Medication 50 MG: at 19:24

## 2023-10-22 RX ADMIN — BUPRENORPHINE AND NALOXONE 1 FILM: 4; 1 FILM, SOLUBLE BUCCAL; SUBLINGUAL at 09:04

## 2023-10-22 RX ADMIN — LURASIDONE HYDROCHLORIDE 20 MG: 20 TABLET, FILM COATED ORAL at 09:04

## 2023-10-22 RX ADMIN — NICOTINE POLACRILEX 2 MG: 2 LOZENGE ORAL at 09:13

## 2023-10-22 RX ADMIN — ACETAMINOPHEN 650 MG: 325 TABLET, FILM COATED ORAL at 19:25

## 2023-10-22 RX ADMIN — HYDROXYZINE HYDROCHLORIDE 25 MG: 25 TABLET, FILM COATED ORAL at 19:25

## 2023-10-22 RX ADMIN — OLANZAPINE 10 MG: 10 TABLET, FILM COATED ORAL at 17:19

## 2023-10-22 RX ADMIN — BUPRENORPHINE AND NALOXONE 1 FILM: 8; 2 FILM, SOLUBLE BUCCAL; SUBLINGUAL at 09:04

## 2023-10-22 RX ADMIN — MULTIPLE VITAMINS W/ MINERALS TAB 1 TABLET: TAB at 09:04

## 2023-10-22 RX ADMIN — NICOTINE POLACRILEX 2 MG: 2 LOZENGE ORAL at 14:18

## 2023-10-22 RX ADMIN — LOSARTAN POTASSIUM 25 MG: 25 TABLET, FILM COATED ORAL at 09:04

## 2023-10-22 RX ADMIN — NICOTINE POLACRILEX 2 MG: 2 LOZENGE ORAL at 22:43

## 2023-10-22 ASSESSMENT — ACTIVITIES OF DAILY LIVING (ADL)
ADLS_ACUITY_SCORE: 40
HYGIENE/GROOMING: INDEPENDENT
ADLS_ACUITY_SCORE: 40
LAUNDRY: WITH SUPERVISION
ADLS_ACUITY_SCORE: 40
ORAL_HYGIENE: INDEPENDENT
DRESS: INDEPENDENT
ADLS_ACUITY_SCORE: 40
LAUNDRY: WITH SUPERVISION
ADLS_ACUITY_SCORE: 40
ADLS_ACUITY_SCORE: 40
ORAL_HYGIENE: INDEPENDENT
DRESS: INDEPENDENT
ADLS_ACUITY_SCORE: 40
ADLS_ACUITY_SCORE: 40
HYGIENE/GROOMING: INDEPENDENT

## 2023-10-22 NOTE — PLAN OF CARE
Pt was received in his room sleeping on the mattress placed on the floor.  In total appeared sleeping for 7 hours without acute distress  15 minutes safety checks completed throughout the night and no issues noted    Problem: Sleep Disturbance  Goal: Adequate Sleep/Rest  Outcome: Progressing     Problem: Adult Behavioral Health Plan of Care  Goal: Adheres to Safety Considerations for Self and Others  Intervention: Develop and Maintain Individualized Safety Plan  Recent Flowsheet Documentation  Taken 10/22/2023 0242 by Loreta Guevara, RN  Safety Measures: safety rounds completed  Goal: Absence of New-Onset Illness or Injury  Intervention: Identify and Manage Fall Risk  Recent Flowsheet Documentation  Taken 10/22/2023 0242 by Loreta Guevara, RN  Safety Measures: safety rounds completed   Goal Outcome Evaluation:

## 2023-10-22 NOTE — PLAN OF CARE
"  Problem: Adult Inpatient Plan of Care  Goal: Plan of Care Review  Description: The Plan of Care Review/Shift note should be completed every shift.  The Outcome Evaluation is a brief statement about your assessment that the patient is improving, declining, or no change.  This information will be displayed automatically on your shift  note.  Outcome: Progressing     Problem: Adult Behavioral Health Plan of Care  Goal: Plan of Care Review  Outcome: Progressing     Problem: Psychotic Signs/Symptoms  Goal: Improved Behavioral Control (Psychotic Signs/Symptoms)  Outcome: Progressing   Goal Outcome Evaluation:       Pt endorses anxiety and rated at 4/10, depression 6/10, but denies SI/SIB/HI/AVH, and contracts for safety. Hydroxyzine administered for anxiety with some relief. Pt is pleasant with a flat affect. Mood presents as occasionally irritable and hyper active. Pt's attention was drawn to his mood and activities. Zyprexa offered, but pt declined and stated \"I don't like medications. I need to use my cooping skills\". Pt was encouraged to continue to use his personal cooping skills. Pt was able to use his personal hair clipper to clip his hairs. Pt eventually calmed down and was able to sit still to watch TV with and engaging with peers through out the remainder of the shift. He continues to be pleasant, calm, cooperative, and medication compliant. However, at 21:00, pt agitation started ramping up. Zyprexa administered with some relief. At about 22:00, pt requested for and received Trazodone for sleep. Pt kept seeking for this staff for sleeping medication. Pt was told to give the medications he has been given some time to kick in.  No other concerns noted or verbalized. Will continue with same plan of care.                       "

## 2023-10-22 NOTE — PLAN OF CARE
"Goal Outcome Evaluation:    Plan of Care Reviewed With: patient      Problem: Psychotic Signs/Symptoms  Goal: Optimal Cognitive Function (Psychotic Signs/Symptoms)  Outcome: Progressing  Note: Pt was pleasant on approach. Presented with a restless affect and disorganized thought process. Appeared easily distracted and had trouble concentrating and focusing during conversations. Pt stated he was very sad yesterday. When asked about his mood today, he endorsed depression, but stated his mood is changing all the time. Pt stated, \"I hear sounds in my head all the time. They're not voices, but they still communicate different avenues. They're secret sounds. Now I'm on all these crazy pills that are probably going to reverse it.\" Pt listened to music with headphones and napped during the shift. Complained of nightmares and asked for medication to address this. Pt stated he wasn't going to go to sleep until he gets something for it.     "

## 2023-10-23 PROCEDURE — 250N000013 HC RX MED GY IP 250 OP 250 PS 637: Performed by: STUDENT IN AN ORGANIZED HEALTH CARE EDUCATION/TRAINING PROGRAM

## 2023-10-23 PROCEDURE — G0177 OPPS/PHP; TRAIN & EDUC SERV: HCPCS

## 2023-10-23 PROCEDURE — 250N000013 HC RX MED GY IP 250 OP 250 PS 637

## 2023-10-23 PROCEDURE — 90853 GROUP PSYCHOTHERAPY: CPT

## 2023-10-23 PROCEDURE — 124N000002 HC R&B MH UMMC

## 2023-10-23 PROCEDURE — 99232 SBSQ HOSP IP/OBS MODERATE 35: CPT | Mod: GC | Performed by: PSYCHIATRY & NEUROLOGY

## 2023-10-23 RX ORDER — LURASIDONE HYDROCHLORIDE 40 MG/1
40 TABLET, FILM COATED ORAL
Status: DISCONTINUED | OUTPATIENT
Start: 2023-10-24 | End: 2023-10-25

## 2023-10-23 RX ORDER — LURASIDONE HYDROCHLORIDE 20 MG/1
20 TABLET, FILM COATED ORAL ONCE
Status: COMPLETED | OUTPATIENT
Start: 2023-10-23 | End: 2023-10-23

## 2023-10-23 RX ORDER — LURASIDONE HYDROCHLORIDE 40 MG/1
40 TABLET, FILM COATED ORAL
Status: DISCONTINUED | OUTPATIENT
Start: 2023-10-23 | End: 2023-10-23

## 2023-10-23 RX ORDER — LANOLIN ALCOHOL/MO/W.PET/CERES
3 CREAM (GRAM) TOPICAL
Status: DISCONTINUED | OUTPATIENT
Start: 2023-10-23 | End: 2023-10-26 | Stop reason: HOSPADM

## 2023-10-23 RX ADMIN — MULTIPLE VITAMINS W/ MINERALS TAB 1 TABLET: TAB at 08:24

## 2023-10-23 RX ADMIN — GUANFACINE 1 MG: 1 TABLET, EXTENDED RELEASE ORAL at 20:30

## 2023-10-23 RX ADMIN — NICOTINE POLACRILEX 2 MG: 2 LOZENGE ORAL at 20:31

## 2023-10-23 RX ADMIN — BUPRENORPHINE AND NALOXONE 1 FILM: 8; 2 FILM, SOLUBLE BUCCAL; SUBLINGUAL at 08:24

## 2023-10-23 RX ADMIN — LURASIDONE HYDROCHLORIDE 20 MG: 20 TABLET, COATED ORAL at 18:11

## 2023-10-23 RX ADMIN — NICOTINE POLACRILEX 2 MG: 2 LOZENGE ORAL at 16:11

## 2023-10-23 RX ADMIN — NICOTINE POLACRILEX 2 MG: 2 LOZENGE ORAL at 13:00

## 2023-10-23 RX ADMIN — Medication 3 MG: at 20:34

## 2023-10-23 RX ADMIN — ACETAMINOPHEN 650 MG: 325 TABLET, FILM COATED ORAL at 08:33

## 2023-10-23 RX ADMIN — HYDROXYZINE HYDROCHLORIDE 25 MG: 25 TABLET, FILM COATED ORAL at 20:34

## 2023-10-23 RX ADMIN — ACETAMINOPHEN 650 MG: 325 TABLET, FILM COATED ORAL at 21:15

## 2023-10-23 RX ADMIN — HYDROXYZINE HYDROCHLORIDE 25 MG: 25 TABLET, FILM COATED ORAL at 15:23

## 2023-10-23 RX ADMIN — BUPRENORPHINE AND NALOXONE 1 FILM: 4; 1 FILM, SOLUBLE BUCCAL; SUBLINGUAL at 08:24

## 2023-10-23 RX ADMIN — NICOTINE POLACRILEX 2 MG: 2 LOZENGE ORAL at 18:46

## 2023-10-23 RX ADMIN — LURASIDONE HYDROCHLORIDE 20 MG: 20 TABLET, FILM COATED ORAL at 08:24

## 2023-10-23 RX ADMIN — LOSARTAN POTASSIUM 25 MG: 25 TABLET, FILM COATED ORAL at 08:24

## 2023-10-23 ASSESSMENT — ACTIVITIES OF DAILY LIVING (ADL)
ADLS_ACUITY_SCORE: 40
HYGIENE/GROOMING: INDEPENDENT
ADLS_ACUITY_SCORE: 40
ADLS_ACUITY_SCORE: 40
LAUNDRY: WITH SUPERVISION
DRESS: INDEPENDENT
LAUNDRY: WITH SUPERVISION
ORAL_HYGIENE: INDEPENDENT
HYGIENE/GROOMING: INDEPENDENT
ADLS_ACUITY_SCORE: 40
ORAL_HYGIENE: INDEPENDENT
DRESS: INDEPENDENT
ADLS_ACUITY_SCORE: 40
ADLS_ACUITY_SCORE: 40

## 2023-10-23 NOTE — PLAN OF CARE
BEH IP Unit Acuity Rating Score (UARS)  Patient is given one point for every criteria they meet.    CRITERIA SCORING   On a 72 hour hold, court hold, committed, stay of commitment, or revocation 0    Patient LOS on BEH unit exceeds 20 days 0  LOS: 3   Patient under guardianship, 55+, otherwise medically complex, or under age 11 0   Suicide ideation without relief of precipitating factors 0   Current plan for suicide 0   Current plan for homicide 0   Imminent risk or actual attempt to seriously harm another without relief of factors precipitating the attempt 0   Severe dysfunction in daily living (ex: complete neglect for self care, extreme disruption in vegetative function, extreme deterioration in social interactions) 1   Recent (last 7 days) or current physical aggression in the ED or on unit 0   Restraints or seclusion episode in past 72 hours 0   Recent (last 7 days) or current verbal aggression, agitation, yelling, etc., while in the ED or unit 0   Active psychosis 1   Need for constant or near constant redirection (from leaving, from others, etc).  0   Intrusive or disruptive behaviors 0   TOTAL 2

## 2023-10-23 NOTE — PLAN OF CARE
Pt appeared sleeping for 6.25 hours without distress  No discomfort or pain verbalized this shift  No precautionary behavior noted or reported    Problem: Sleep Disturbance  Goal: Adequate Sleep/Rest  Outcome: Progressing     Problem: Adult Behavioral Health Plan of Care  Goal: Adheres to Safety Considerations for Self and Others  Intervention: Develop and Maintain Individualized Safety Plan  Recent Flowsheet Documentation  Taken 10/23/2023 0331 by Loreta Guevara, RN  Safety Measures: safety rounds completed  Goal: Absence of New-Onset Illness or Injury  Intervention: Identify and Manage Fall Risk  Recent Flowsheet Documentation  Taken 10/23/2023 0331 by Loreta Guevara, RN  Safety Measures: safety rounds completed   Goal Outcome Evaluation:

## 2023-10-23 NOTE — PLAN OF CARE
"Goal Outcome Evaluation:    Plan of Care Reviewed With: patient      Problem: Psychotic Signs/Symptoms  Goal: Optimal Cognitive Function (Psychotic Signs/Symptoms)  Outcome: Progressing  Note: Pt presented with an anxious, restless affect. Speech was tangential and thought process was disorganized. Pt stated his mood was \"confused.\" When asked what he was confused about, pt responded, \"the war.\" He endorsed auditory hallucinations, which he described  as hearing sounds that communicate with him where to go and what to do. Pt stated, \"sometimes they are bad sounds, evil, like the devil is on my shoulder.\" He reported he doesn't like his new medication because he believes it's making him sleep too much, but stated he would take it, which he did. Pt attended groups and napped during the shift. Requested and received tylenol for back pain.    "

## 2023-10-23 NOTE — PLAN OF CARE
"  Problem: Adult Behavioral Health Plan of Care  Goal: Adheres to Safety Considerations for Self and Others  Outcome: Progressing  Flowsheets (Taken 10/22/2023 2142)  Adheres to Safety Considerations for Self and Others: making progress toward outcome  Intervention: Develop and Maintain Individualized Safety Plan  Recent Flowsheet Documentation  Taken 10/22/2023 1653 by Shy Coburn RN  Safety Measures:   environmental rounds completed   safety rounds completed   Goal Outcome Evaluation:    Plan of Care Reviewed With: patient          Pt visible and active on the unit, but socially withdrawn, does not interact with peers. He is making and receiving phone calls, going to groups and pacing on the hallways. Pt is disorganized and presented with delusional thought contents. He is anxious and restless. Ability to focus and concentrate is mildly impaired. Pt is frequently at the desk with demands and needing redirection. Thought process is tangential. Paranoia is evident, pt reported mild agitation and requested for olanzapine, but stated medication looks different when nurse gave him the medication, asking many odd questions \"is it used for cats or dogs\" he said, \"I have not seen a human take this medication\" pt took the medication with redirection. Pt requested for his hs medication then declines when medication was presented to him. Pt said \"this medication makes my hand numb.\" Pt's speech is tangential and pressured at times. Pt denies SI/HI/SIB and hallucination. Overall, he appears more at ease on the unit. He came out for meal, ate 100% dinner and snacks. Pt requested and received prn Tylenol for c/o foot pain 8/10, per pt helpful, rated pain 2/10 after intervention. Pt endorsed anxiety 6/10 at 1930. Prn hydroxyzine 25 mg given and expresses some relief. Also received prn trazodone 50 mg po for sleep at 1930 as requested. Pt's mom called and spoke with nurse, she stated pt had called her that he had been " "sleeping \"all day\" and had difficulties staying awake. Nurse informed pt's mom that pt has been awake through out this shift. Pt napped 2 hours on day shift per report. Pt even requested for trazodone at hs. Pt's mother will like the Team to call in the morning @ [phone].                   "

## 2023-10-23 NOTE — PLAN OF CARE
Team Note Due:  Monday     Assessment/Intervention/Current Symtoms and Care Coordination:  - chart review  - team meeting - discussed pt progress, symptomology, and response to treatment.  Discussed the discharge plan and any potential impediments to discharge.  - post team rounds team meeting / discussion    Discharge Plan or Goal:  Pending stabilization & development of a safe discharge plan.       Barriers to Discharge:  Patient requires further psychiatric stabilization due to current symptomology       Referral Status:  No new      Legal Status:  Voluntary      Contacts:  Tamera Betancourt - [phone] - AILYN in media -   Edoc Flores - [phone]   AILYN in media -     Medication Management :  [facility]  [phone]  Fax: [phone]    Upcoming Meetings and Dates/Important Information and next steps:  - will require follow up scheduled when discharged

## 2023-10-23 NOTE — CARE PLAN
"   10/22/23 1900   General Information   Art Directive other (see comments)       Art Therapy directive was to create past, present and future symbols in response to the affirmation: \"The past is a lesson. The present is a gift. The future is your motivation.\"  Goals of directive: to create a visual self narrative, to identify personal strengths and goals, emotional expression  Pt was observed as tense, anxious, difficulty sitting still at times. Pt lorna three symbols and shared at the end of group with author and peers. Pt wrote \"evil\" for his past symbols with horn imagery and upside down crosses. Pt said while sharing his work, \"well, I wasn't really evil, just had some tough times.\" Pt lorna a landscape image for his future symbol saying that his goal/motivation is \"serenity.\"         "

## 2023-10-23 NOTE — CARE PLAN
"Occupational Therapy Group Note      10/23/23 1449   Group Therapy Session   Group Attendance attended group session   Group Type expressive therapy;psychotherapeutic;recreation   Group Topic Covered coping skills/lifestyle management;leisure exploration/use of leisure time;medication management;structured socialization   Group Session Detail OT: [facility] (10:15-11:05; 10 participants) for total body wellness, grounding, sensory modulation, promoting social engagement, promoting organization through structured activity and coping with stress and symptoms.   OT: [facility] (11:10-12:00; 7 participants) for creative expression, promoting autonomy, building sense of self-worth, coping with stress/symptoms and opportunity to exercise cognitive skills (i.e. initiation, planning, organization, sequencing, sustained attention, follow-through, termination of task and overall safety awareness).   Patient Response/Contribution disorganized;cooperative with task    Patient joined group for  45 minutes.  Pt initially shared that his goal was to \"stay awake\" but then after hearing his peers share goals, he said he wanted to change his goal to be not dissociating, being proactive, trying to learn and doing self-care.  Pt offered superficial answers to questions.  Mood/affect appeared elevated.   Patient joined group for 45 minutes.  Pt was initially disorganized with an elevated mood, grabbing supplies for 3 or more projects and putting them at his table.  Pt took about 10 minutes of sifting through supplies to decide what he wanted to do.  Writer offered verbal support for cleaning and organizing his space before beginning to work.  Once pt had his task selected, he worked in a goal-directed and organized manner with attention to detail.  Pt engaged in casual conversation with peers.  Overall mood and energy level appeared more regulated once he was working on a hands on task.           "

## 2023-10-23 NOTE — PROGRESS NOTES
"  ----------------------------------------------------------------------------------------------------------  [facility]  Psychiatry Progress Note  Hospital Day #3     Interim History:     The patient's care was discussed with the treatment team and chart notes were reviewed.    Vitals: VSS  Sleep: 6.25 hours (10/23/23 0600)  Scheduled medications:Took all scheduled medications as prescribed  Psychiatric PRN medications: hydroxyzine, trazadone    Staff Report:   No acute events or safety concerns overnight. Pt presented with an anxious, restless affect. Speech was tangential and thought process was disorganized. Pt stated his mood was \"confused.\" When asked what he was confused about, pt responded, \"the war.\" He endorsed auditory hallucinations, which he described  as hearing sounds that communicate with him where to go and what to do. Pt stated, \"sometimes they are bad sounds, evil, like the devil is on my shoulder.\"  Please see staff notes for details.      Subjective:     Patient Interview:  Markus Mosley is interviewed in his room. Says he is feeling quite well and thinks that he is experiencing an episode of \"manic.\" He feels as though \"I can do everything.\" He had previously been medicated for magali but this makes the manic episodes go away. He states that he has previously been diagnosed with schizoaffective disorder, anxiety, and depression, but he does not think that any of these diagnoses quite fit him. Patient also notes moments of stress, where he notes that he can act \"impulsive.\"     Patient saws he was admitted for episodes of grief and anxiety over the past few weeks. He noted that there were occasions where he would forget to eat and do other daily tasks. He was brought by his mother, who was quite concerned by these changes. While in the hospital, patient notes that he has had pain in his feet and legs and also dealing with intermittent urination " "and defecation difficulties. In addition, he has also had difficulties sleeping and has had many episodes where he would cry when waking up in the morning, and this has been concerning to him. He also hears \"sounds\" while he tries to sleep and this is very distressing to him. Patient denies any thought of hurting himself or others.     Patient notes that the Latuda, which he started on admission, makes him quite sleepy, so he is hesitant to increase the dosage. However, he is open to taking the medication at night as opposed to the morning. He was also hoping to start an anxiety medication at some point soon given his recent symptom development.       Phone call with Betty (mother) : Betty says that HAILY has not been doing well for a few months. She describes mood lability, disorganized behaviors, general confusion, AH, and sensitivity to sound. He decompensated more so after the death of his sister whom he was very close with. She passed away from alcohol use disorder/cirrhosis of liver. She was planning to go to treatment but then went missing for 6 days. Was found  in her car Aug 24. Betty shares concern about a previous partner of HAILY's named Mohan. HAILY had been living with him in [place] previously. They met in a treatment program years ago. Mohan also has mental health diagnoses and is described as unstable. Betty is concerned about him contacting HAILY as he has convinced him to leave psychiatric hospitals AMA in the past. Says that HAILY is currently on probation for pushing a  and that he last had court on the . She will bring in court paperwork when she comes to visit him on the unit. She reports that his current psychiatrist is Dr. Moralez at the [facility].     ROS:  Patient has  foot pain  Patient denies acute concerns     Objective:     Vitals:  BP (!) 152/90 (BP Location: Left arm, Patient Position: Sitting)   Pulse 77   Temp 97.6  F (36.4  C) (Temporal)   Resp 14   Ht " "1.727 m (5' 8\")   Wt 82.4 kg (181 lb 11.2 oz)   SpO2 100%   BMI 27.63 kg/m      Allergies:  Allergies   Allergen Reactions    Penicillins Swelling       Current Medications:  Scheduled:  Current Facility-Administered Medications   Medication Dose Route Frequency    buprenorphine HCl-naloxone HCl  1 Film Sublingual Daily    And    buprenorphine HCl-naloxone HCl  1 Film Sublingual Daily    guanFACINE  1 mg Oral At Bedtime    losartan  25 mg Oral Daily    lurasidone  20 mg Oral Daily    multivitamin w/minerals  1 tablet Oral Daily       PRN:  Current Facility-Administered Medications   Medication Dose Route Frequency    acetaminophen  650 mg Oral Q4H PRN    alum & mag hydroxide-simethicone  30 mL Oral Q4H PRN    hydrOXYzine  25 mg Oral Q4H PRN    nicotine  2 mg Buccal Q1H PRN    OLANZapine  10 mg Oral TID PRN    Or    OLANZapine  10 mg Intramuscular TID PRN    polyethylene glycol  17 g Oral Daily PRN    traZODone  50 mg Oral At Bedtime PRN       Labs and Imaging:  New results:   No results found for this or any previous visit (from the past 24 hour(s)).    Data this admission:  - CBC unremarkable  - CMP unremarkable  - TSH normal  - UDS positive for cannabinoids  - Vit D normal  - Hgb A1c normal, 5.4  - Triglycerides elevated to 193   - Vit B12 normal  - Folate normal       Mental Status Exam:     Oriented to:  Person/Self and Situation  General:  Awake and Alert  Appearance:  appears stated age  Behavior/Attitude:  Cooperative, Engaged, Difficult to redirect, Easily distracted and Open  Eye Contact: Downcast and Glances  Psychomotor: No evidence of tics, dystonia, or tardive dyskinesia  and Restless no catatonia present  Speech:  appropriate volume/tone and talkative  Language: Fluent in English with appropriate syntax and vocabulary.  Mood:  \"well\"  Affect:  congruent with mood and anxious  Thought Process:  rambling, circumstantial and disorganized  Thought Content:    endorses AH/VH, No SI,, No HI and " distressing hallucinations; delusions of bizarre content  Associations:  questionable  Insight:  partial due to ability to talk about symptoms, though too disorganized to elaborate fully at this time, does not agree with prev diagnoses  Judgment:  partial due to psychosis  Impulse control: fair  Attention Span:  adequate for conversation  Concentration:  grossly intact  Recent and Remote Memory:  grossly intact  Fund of Knowledge: average  Muscle Strength and Tone: normal  Gait and Station: Normal     Psychiatric Assessment     Markus Mosley is a 37 year old male with previous psychiatric diagnoses of schizoaffective disorder, anxiety, ADHD, polysubstance abuse (alcohol, cannabis,  benzodiazepines, opiates) and PTSD admitted from the ER on 10/20/2023 due to concern for psychosis in the context of psychosocial stressors including romantic issues, legal issues, loss, and chronic mental health issues. Most recent psychiatric hospitalization was last year at [place]. Significant symptoms on admission include feeling paranoid delusions and restless and agitated from increased anxiety. The MSE on admission was pertinent for bizarre affect, loose associations, disorganized thought and speech and reports of paranoid delusions. Biological contributions to mental health presentation include chronic mental health conditions including schizophrenia, non adherence to psychiatric medications, prior history of substance use and contributing medical conditions such as hypertension. Psychological contributions to mental health presentation include lack of insight and difficulty with coping mechanisms with recent loss of sibling. Social factors contributing to mental health presentation include employment instability, legal concerns, recent breakup and recent change in housing. Protective factors include mother as support system, access and engagement in care.      In summary, the patient's reported symptoms of paranoid delusions,  restlessness, disorganized thought and speech in the context of increased social stressors and inconsistency in medication adherence are consistent with schizoaffective disorder. He will likely benefit from medication management this admission.     Given that he currently has psychosis, patient warrants inpatient psychiatric hospitalization to maintain his safety.      Psychiatric Plan by Diagnosis      Today's changes:  - increase latuda to 40 mg daily with evening meal, first dose 10/24  - one time dose 20 mg latuda today with evening meal  - prn melatonin     # Schizoaffective disorder  1. Medications:  - latuda 40 mg     2. Pertinent Labs/Monitoring:   - none     3. Additional Plans:  - Patient will be treated in therapeutic milieu with appropriate individual and group therapies as described      # Generalized Anxiety Disorder  # ADHD  # PTSD  # Polysubstance Use Disorder  - Per chart review  - PTA suboxone  - PTA guanfacine  - hydroxyzine prn       Psychiatric Hospital Course:      Markus Mosley was admitted to [facility] as a voluntary patient.   Medications:  PTA Suboxone was continued.   Latuda 20mg was started on 10/21/23. Increased to 40 mg 10/23.     The risks, benefits, alternatives, and side effects were discussed and understood by the patient.        Medical Assessment and Plan     Medical diagnoses to be addressed this admission:    # HTN  -Losartan 25 mg daily        Medical course:Patient was physically examined by the ED prior to being transferred to the unit and was found to be medically stable and appropriate for admission.     Consults: none     Checklist     Legal Status: Voluntary     Safety Assessment:   Behavioral Orders   Procedures    Code 1 - Restrict to Unit    Routine Programming     As clinically indicated    Status 15     Every 15 minutes.       Risk Assessment:  Risk for harm is moderate.  Risk factors: trauma, impulsive and past behaviors  Protective factors: family and engaged  in treatment     SIO: no    Disposition:  Pending stabilization, medication optimization, & development of a safe discharge plan.      [employer]     Attestations       This patient was seen and discussed with my attending physician.  Alex Chiang, MS3  [facility] Medical Student     I was present with the medical student who participated in the service and in the documentation of the note.  I have verified the history and personally performed the physical exam and medical decision making. I agree with the assessment and plan of care as documented in the note.    Carol Felder MD MPH  PGY1 Psychiatry Resident Physician    This patient has been seen and evaluated by me, Alex Flores.  I have discussed this patient with the psychiatry resident and I agree with the findings and plan in this note.    I have reviewed today's vital signs, medications, labs and imaging.     Alex Oliva MD on 10/23/2023 at 5:37 PM

## 2023-10-23 NOTE — PROVIDER NOTIFICATION
10/23/23 1551   Individualization/Patient Specific Goals   Patient Personal Strengths expressive of needs;family/social support;independent living skills;motivated for treatment;self-reliant;resourceful   Patient Vulnerabilities history of unsuccessful treatment;housing insecurity;substance abuse/addiction;traumatic event   Interprofessional Rounds   Participants psychotherapy;CTC;psychiatrist;nursing   Behavioral Team Discussion   Progress initial team meeting   Anticipated length of stay TBD   Continued Stay Criteria/Rationale initial assessment and treatment of psychosis   Medical/Physical medically cleared for admission - see Psychiatry progress notes   Precautions see below   Plan Psychiatric assessment/Medication management. Therapeutic Milieu. Individual care planning and after care planning. Patient to participate in unit groups and activities. Individual and group support on unit.   Rationale for change in precautions or plan per assessment   Anticipated Discharge Disposition   (assessment ongoing)         PRECAUTIONS AND SAFETY    Behavioral Orders   Procedures    Assault precautions    Code 1 - Restrict to Unit    Routine Programming     As clinically indicated    Status 15     Every 15 minutes.       Safety  Safety WDL: WDL  Patient Location: patient room, own  Observed Behavior: walking  Safety Measures: safety rounds completed  Diversional Activity: music

## 2023-10-24 ENCOUNTER — TELEPHONE (OUTPATIENT)
Dept: BEHAVIORAL HEALTH | Facility: CLINIC | Age: 38
End: 2023-10-24
Payer: COMMERCIAL

## 2023-10-24 PROCEDURE — 93010 ELECTROCARDIOGRAM REPORT: CPT | Performed by: INTERNAL MEDICINE

## 2023-10-24 PROCEDURE — 250N000013 HC RX MED GY IP 250 OP 250 PS 637

## 2023-10-24 PROCEDURE — 93005 ELECTROCARDIOGRAM TRACING: CPT

## 2023-10-24 PROCEDURE — 99232 SBSQ HOSP IP/OBS MODERATE 35: CPT | Mod: GC | Performed by: PSYCHIATRY & NEUROLOGY

## 2023-10-24 PROCEDURE — 250N000013 HC RX MED GY IP 250 OP 250 PS 637: Performed by: STUDENT IN AN ORGANIZED HEALTH CARE EDUCATION/TRAINING PROGRAM

## 2023-10-24 PROCEDURE — 90791 PSYCH DIAGNOSTIC EVALUATION: CPT | Performed by: COUNSELOR

## 2023-10-24 PROCEDURE — 124N000002 HC R&B MH UMMC

## 2023-10-24 PROCEDURE — 90853 GROUP PSYCHOTHERAPY: CPT

## 2023-10-24 RX ORDER — PRAZOSIN HYDROCHLORIDE 1 MG/1
2 CAPSULE ORAL AT BEDTIME
Status: DISCONTINUED | OUTPATIENT
Start: 2023-10-24 | End: 2023-10-26 | Stop reason: HOSPADM

## 2023-10-24 RX ADMIN — BUPRENORPHINE AND NALOXONE 1 FILM: 8; 2 FILM, SOLUBLE BUCCAL; SUBLINGUAL at 08:10

## 2023-10-24 RX ADMIN — NICOTINE POLACRILEX 2 MG: 2 LOZENGE ORAL at 12:15

## 2023-10-24 RX ADMIN — LURASIDONE HYDROCHLORIDE 40 MG: 40 TABLET, COATED ORAL at 16:26

## 2023-10-24 RX ADMIN — NICOTINE POLACRILEX 2 MG: 2 LOZENGE ORAL at 06:38

## 2023-10-24 RX ADMIN — BUPRENORPHINE AND NALOXONE 1 FILM: 4; 1 FILM, SOLUBLE BUCCAL; SUBLINGUAL at 08:10

## 2023-10-24 RX ADMIN — NICOTINE POLACRILEX 2 MG: 2 LOZENGE ORAL at 20:17

## 2023-10-24 RX ADMIN — NICOTINE POLACRILEX 2 MG: 2 LOZENGE ORAL at 18:38

## 2023-10-24 RX ADMIN — NICOTINE POLACRILEX 2 MG: 2 LOZENGE ORAL at 16:26

## 2023-10-24 RX ADMIN — LOSARTAN POTASSIUM 25 MG: 25 TABLET, FILM COATED ORAL at 08:08

## 2023-10-24 RX ADMIN — NICOTINE POLACRILEX 2 MG: 2 LOZENGE ORAL at 08:10

## 2023-10-24 RX ADMIN — GUANFACINE 1 MG: 1 TABLET, EXTENDED RELEASE ORAL at 19:24

## 2023-10-24 RX ADMIN — Medication 50 MG: at 20:16

## 2023-10-24 RX ADMIN — MULTIPLE VITAMINS W/ MINERALS TAB 1 TABLET: TAB at 08:08

## 2023-10-24 RX ADMIN — PRAZOSIN HYDROCHLORIDE 2 MG: 1 CAPSULE ORAL at 20:16

## 2023-10-24 ASSESSMENT — COLUMBIA-SUICIDE SEVERITY RATING SCALE - C-SSRS
TOTAL  NUMBER OF ABORTED OR SELF INTERRUPTED ATTEMPTS SINCE LAST CONTACT: NO
6. HAVE YOU EVER DONE ANYTHING, STARTED TO DO ANYTHING, OR PREPARED TO DO ANYTHING TO END YOUR LIFE?: NO
ATTEMPT SINCE LAST CONTACT: NO
2. HAVE YOU ACTUALLY HAD ANY THOUGHTS OF KILLING YOURSELF?: NO
SUICIDE, SINCE LAST CONTACT: NO
TOTAL  NUMBER OF INTERRUPTED ATTEMPTS SINCE LAST CONTACT: NO
1. SINCE LAST CONTACT, HAVE YOU WISHED YOU WERE DEAD OR WISHED YOU COULD GO TO SLEEP AND NOT WAKE UP?: NO

## 2023-10-24 ASSESSMENT — ACTIVITIES OF DAILY LIVING (ADL)
ORAL_HYGIENE: INDEPENDENT
ADLS_ACUITY_SCORE: 40
ORAL_HYGIENE: INDEPENDENT
LAUNDRY: WITH SUPERVISION
ADLS_ACUITY_SCORE: 40
HYGIENE/GROOMING: INDEPENDENT
LAUNDRY: WITH SUPERVISION
DRESS: STREET CLOTHES
ADLS_ACUITY_SCORE: 40
ADLS_ACUITY_SCORE: 40
DRESS: STREET CLOTHES
ADLS_ACUITY_SCORE: 40
HYGIENE/GROOMING: INDEPENDENT

## 2023-10-24 ASSESSMENT — ANXIETY QUESTIONNAIRES
GAD7 TOTAL SCORE: 15
5. BEING SO RESTLESS THAT IT IS HARD TO SIT STILL: NEARLY EVERY DAY
6. BECOMING EASILY ANNOYED OR IRRITABLE: NEARLY EVERY DAY
2. NOT BEING ABLE TO STOP OR CONTROL WORRYING: MORE THAN HALF THE DAYS
3. WORRYING TOO MUCH ABOUT DIFFERENT THINGS: MORE THAN HALF THE DAYS
7. FEELING AFRAID AS IF SOMETHING AWFUL MIGHT HAPPEN: SEVERAL DAYS
4. TROUBLE RELAXING: MORE THAN HALF THE DAYS
GAD7 TOTAL SCORE: 15
1. FEELING NERVOUS, ANXIOUS, OR ON EDGE: MORE THAN HALF THE DAYS

## 2023-10-24 ASSESSMENT — PATIENT HEALTH QUESTIONNAIRE - PHQ9: SUM OF ALL RESPONSES TO PHQ QUESTIONS 1-9: 20

## 2023-10-24 NOTE — PLAN OF CARE
BEH IP Unit Acuity Rating Score (UARS)  Patient is given one point for every criteria they meet.    CRITERIA SCORING   On a 72 hour hold, court hold, committed, stay of commitment, or revocation 0    Patient LOS on BEH unit exceeds 20 days 0  LOS: 4   Patient under guardianship, 55+, otherwise medically complex, or under age 11 0   Suicide ideation without relief of precipitating factors 0   Current plan for suicide 0   Current plan for homicide 0   Imminent risk or actual attempt to seriously harm another without relief of factors precipitating the attempt 0   Severe dysfunction in daily living (ex: complete neglect for self care, extreme disruption in vegetative function, extreme deterioration in social interactions) 1   Recent (last 7 days) or current physical aggression in the ED or on unit 0   Restraints or seclusion episode in past 72 hours 0   Recent (last 7 days) or current verbal aggression, agitation, yelling, etc., while in the ED or unit 0   Active psychosis 1   Need for constant or near constant redirection (from leaving, from others, etc).  0   Intrusive or disruptive behaviors 0   TOTAL 2

## 2023-10-24 NOTE — PLAN OF CARE
"  Problem: Adult Inpatient Plan of Care  Goal: Optimal Comfort and Wellbeing  Outcome: Progressing   Goal Outcome Evaluation:  Patient said this afternoon, when this RN attempted to check in with him again, \"I need to be alone.\" Earlier today patient said he wanted to discharge. He said \"I'm starting to have nightmares here now.\" Patient said some of his meds make him tired. He was compliant with all scheduled AM meds. PRN nicotine lozenge given x 1 this shift, no other prns given. Patient has a new order of Prazosin to start tonight at bedtime. EKG was done. Patient has been attending groups. He appears restless and has been pacing at times. Denied suicidal thoughts.   No roommate order for paranoia and agitation.  BP (!) 139/97 (BP Location: Left arm, Patient Position: Sitting)   Pulse 79   Temp 97.8  F (36.6  C) (Temporal)   Resp 14   Ht 1.727 m (5' 8\")   Wt 83 kg (183 lb)   SpO2 99%   BMI 27.83 kg/m                        "

## 2023-10-24 NOTE — PLAN OF CARE
"  Problem: Adult Behavioral Health Plan of Care  Goal: Adheres to Safety Considerations for Self and Others  Outcome: Progressing  Intervention: Develop and Maintain Individualized Safety Plan  Recent Flowsheet Documentation  Taken 10/23/2023 1733 by Shy Coburn RN  Safety Measures:   environmental rounds completed   safety rounds completed     Problem: Suicidal Behavior  Goal: Suicidal Behavior is Absent or Managed  Outcome: Progressing   Goal Outcome Evaluation:    Plan of Care Reviewed With: patient      Pt was visible on the unit, but socially withdrawn to self. Pt spends time pacing on the units,  goes in and out of his room, went to group and occasionally watched base ball game on TV. Mood presents as restless and anxious. Affect is congruent with mood. He was compliant with evening dose of Latuda and other scheduled medication. Thoughts are disorganized. Pt endorsed anxiety 7/10 at 1600 and denies depression.  Pt declines prn antianxiety meds offered. Pt claims the medication makes him \"sleepy.\" Enc activities such as watching TV to distract form behavior. At hs pt reported his anxiety was getting worse 8/10. Pt agreed to take antianxiety medication. Prn hydroxyzine 25 po and melatonin 3 mg given. Pt's mother visited this evening. Pt reported visit went well. Pt was out for meals and reported eating 100% dinner and snacks. No angry out burst. Pt speech is still tangential but much improved. No medical or safety concerns.              "

## 2023-10-24 NOTE — PROGRESS NOTES
10/24/23 1219   Group Therapy Session   Group Attendance attended group session   Time Session Began 1015   Time Session Ended 1100   Total Time (minutes) 35   Total # Attendees 5   Group Type life skill   Group Session Detail Education and group discussion on techniques to reduce stress along with qigong exercises and tapping to reduce stress, explore healthy coping strategies, relaxation, calming, mood stabilization, reality-based activity, foster hope for a sustainable recovery, grounding, and an opportunity for socialization.   Patient Participation Detail Pt was a bit dismissive and had an excess of energy.  He did very exaggerated movements from the ones shown and was constantly moving, even during the explanation portion.  While exercising pt would often exhale very loudly or flat out moan.  Some of pt's movements were a bit bizarre.  Pt interrupted therapist while presenting processing information towards the end of the group asking when the next group was.  Pt did not participate in any of the discussion and left the group early.  During the check in question pt's response was grandiose and again dismissive.  No charge.

## 2023-10-24 NOTE — PLAN OF CARE
Team Note Due:  Monday    Assessment/Intervention/Current Symtoms and Care Coordination:  Chart review and met with team, discussed pt progress, symptomology, and response to treatment.  Discussed the discharge plan and any potential impediments to discharge.    -Writer met with pt who reported he has been improving on the unit and feels ready to discharge back home to parent's house. Pt reports that he has had many recent events contributing to his recent break down, but feels he is beginning to unravel those things. Pt reports having a therapist and psychiatrist at [facility] but isn't sure when his next appts are. Writer received a VM from pt's mother who reported being in full support of pt discharging and will pick him up.   -Writer spoke to Pat, pt's mother who reported frustrations with the pt's process during his stay in the ED and the transition to this station as well as the lack of continuity here & with outside providers. She also stated that she's seen improvement in the past few days and doesn't feel he needs much else from this hospitalization. Pat reports they feel ready to assist him with outpatient support and plan to take pt for a retreat to their cabin this week/weekend.       Discharge Plan or Goal:  Home with parents     Barriers to Discharge:  Symptom stabilization     Referral Status:  None at this time     Legal Status:  Voluntary       Contacts:  Betty (mother): [phone]/ Father- [phone]     Upcoming Meetings and Dates/Important Information and next steps:

## 2023-10-24 NOTE — PLAN OF CARE
Pt appeared sleeping for 6.25 hours  with uneventful night  Woke up around 0630 and verbalized  that he needs to go home. Pt was  informed to discuss with with provider team this morning and he is okay with it.  No pain or discomfort reported  No inappropriate behaviors demonstrated.    Problem: Sleep Disturbance  Goal: Adequate Sleep/Rest  Outcome: Progressing     Problem: Psychotic Signs/Symptoms  Goal: Improved Behavioral Control (Psychotic Signs/Symptoms)  Outcome: Progressing   Goal Outcome Evaluation:

## 2023-10-24 NOTE — CARE PLAN
Occupational Therapy Group Note      10/24/23 1445   Group Therapy Session   Group Attendance attended group session   Group Type expressive therapy;psychotherapeutic;recreation   Group Topic Covered coping skills/lifestyle management;emotions/expression;leisure exploration/use of leisure time;structured socialization   Group Session Detail OT: [facility] (11:15-12:00; 6 participants) for creative expression, promoting autonomy, building sense of self-worth, coping with stress/symptoms and opportunity to exercise cognitive skills (i.e. initiation, planning, organization, sequencing, sustained attention, follow-through, termination of task and overall safety awareness).   OT: Life Stories activity (13:15-14:00; 8 participants) to promote social engagement and dialogue, improve mood, support positive thinking and to facilitate reality orientation.   Patient Response/Contribution cooperative with task;disorganized    Patient joined group for 35 minutes (no charge).  Pt was able to gather supplies for the project he started yesterday.  Pt was goal directed and detail oriented.  Pt socialized with peers while working on the task.  Pt intermittently paused to work on a few worksheets that he had selected (Personal Recovery Goal and Relapse Prevention Plan).  Overall mood appeared content; affect was congruent.   Patient joined group for 35 minutes (no charge).  Pt was motivated to engage and stated that the activity was challenging for him and it is hard for him to think about his past.  Pt commented on not having many memories of his childhood and attributed this to dissociation.  With extra time, pt was able to generate and share memories with the group.  Pt tends to ramble, which appears to be secondary to anxiety and self-doubt.  Pt expressed gratitude for group.

## 2023-10-24 NOTE — PROGRESS NOTES
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 record and  provided by patient who is a good historian.    Patient will have open access to their mental health medical record.    9.   Interactive Complexity: No    10. Safety Plan:      Adult Long Safety Plan:     [facility]                                       Markus Mosley     SAFETY PLAN:  Step 1: Warning signs / cues (Thoughts, images, mood, situation, behavior) that a crisis may be developing:  Thoughts: \"People would be better off without me\", \"I can't do this anymore\", \"I just want this to end\", and \"Nothing makes it better\"  Images: flashbacks and visions of harm: cutting  Thinking Processes: ruminations (can't stop thinking about my problems), racing thoughts, intrusive thoughts (bothersome, unwanted thoughts that come out of nowhere), disorganized thinking, and paranoia  Mood: worsening depression, hopelessness, helplessness, intense anger, and mood swings  Behaviors: isolating/withdrawing , using drugs, using alcohol, can't stop crying, impulsive, reckless behaviors (acting without thinking), aggression, not taking care of myself, not taking care of my responsibilities, sleeping too much, and not sleeping " "enough  Situations: pain, relationship problems, trauma , relapse, and financial stress   Step 2: Coping strategies - Things I can do to take my mind off of my problems without contacting another person (relaxation technique, physical activity):  Distress Tolerance Strategies:  softball, snowboarding, video games, talking to people   Physical Activities: go for a walk, meditation, and deep breathing  Focus on helpful thoughts:  \"This is temporary\", \"I will get through this\", and remind myself of what is important to me: sobriety and family and health  Step 3: People and social settings that provide distraction:   Name: Tiffanie (his sister) Phone: in his phone  park   Step 4: Remind myself of people and things that are important to me and worth living for:  sobriety and family and health  Step 5: When I am in crisis, I can ask these people to help me use my safety plan:   Tiffanie (his sister) Phone: in his phone  Step 6: Making the environment safe:   remove drugs and be around others  Step 7: Professionals or agencies I can contact during a crisis:  Suicide Prevention Lifeline: 9-418-141-BZBU (8048)  Crisis Text Line Service (available 24 hours a day, 7 days a week): Text [place] to 519971  Call  **CRISIS (485428) from a cell phone to talk to a team of professionals who can help you.  Crisis Services By [facility]: Phone Number:   [place]     [phone]   [place]    [phone]   [place]    [phone]   [place]    [phone]   [place]    [phone]   [place] [phone]   [place]     [phone]       Call 911 or go to my nearest emergency department.   I helped develop this safety plan and agree to use it when needed.  I have been given a copy of this plan.      Client signature _________________________________________________________________  Today s date:  10/24/2023  Completed by Provider Name/ Credentials:  Laura Wilson, [facility], [facility]   October 24, 2023  Adapted from Safety Plan Template 2008 Nydia Rogel" and Kieran Rojo is reprinted with the express permission of the authors.  No portion of the Safety Plan Template may be reproduced without the express, written permission.  You can contact the authors at [email or web address] or [email or web address].        Provider Name/ Credentials:  RUPAL Ricardo, ARLEEN   2023      LOCUS Worksheet     Name: Markus Mosley MRN: [id number]    : 1985      Gender:  male    PMI:  [id number]    Provider Name: RUPAL Ricardo, ARLEEN    Provider NPI:  [id number]     Actual level of Care Provided:  psychiatry    Service(s) receiving or referred to:  ADT/IOP    Reason for Variance: Patient is currently admitted at [facility] on a mental health unit and was recommended referral for mental health outpatient programming. Patient is already working with a psychiatrist  and reports an increase in symptoms and being unable to engage in his daily routine and responsibilities. Patient is currently unemployed. Patient would appear to benefit from ADT/IOP to provide additional structured support and routine in addressing his symptoms to prevent needing higher level of care again after discharge.      Rating completed by: RUPAL Ricardo, ARLEEN       I. Risk of Harm:   3      Moderate Risk of Harm    II. Functional Status:   3      Moderate Impairment    III. Co-Morbidity:   2      Minor Co-Morbidity    IV - A. Recovery Environment - Level of Stress:   2      Mildly Stressful Environment    IV - B. Recovery Environment - Level of Support:   2      Supportive Environment    V. Treatment and Recovery History:   3      Moderate to Equivocal Response to Treatment and Recovery Management    VI. Engagement and Recovery Project:   3      Limited Engagement and Recovery       18 Composite Score    Level of Care Recommendation:   17 to 19       High Intensity Community Based Services

## 2023-10-24 NOTE — PROGRESS NOTES
"  ----------------------------------------------------------------------------------------------------------  [facility]  Psychiatry Progress Note  Hospital Day #4     Interim History:     The patient's care was discussed with the treatment team and chart notes were reviewed.    Vitals: VSS  Sleep: 6.25 hours (10/24/23 0600)  Scheduled medications:Took all scheduled medications as prescribed  Psychiatric PRN medications: hydroxyzine, trazadone    Staff Report:   No acute events or safety concerns overnight. Pt woke up at 0630 and verbalized that he needs to go home, agreed to discuss this with provider team in the morning. He stated that he is finding the latuda to be sedating. He was disorganized in group yesterday, but once he got his supplies he focused for half an hour and did well.     Subjective:     Patient Interview:  Markus Mosley is interviewed in his room. Patient wanted to discuss discharge because of waking up due to nightmares, sweating, and he discloses lots of flashbacks. He is scared to go to sleep, states these issues would improve outside of the hospital if he is using THC. He states that \"THC is the only crutch I have.\" He wants medication so that he does not experience his nightmares. He wants to get back on THC and Vyvanse, states it's the only thing that has worked.    His mother visited yesterday, which he states went well. They discussed going to the cabin in [place] for 6 months. He says he is very sensitive to sounds, feels that the serenity of the cabin would be good for his anxiety. Patient does not want to live with his parents at home, which is why discussion about cabin was brought up with parents.    Patient states that he is feeling great and is ready to go home, although he endorses some sadness with mood fluctuation for the past 2 days due to \"trauma\". He discloses foot pain, back pain on daily basis, and says his hands go numb at " "night. Patient denies seeing people, rephrases as \"seeing people in people\", describing as a spiritual reminder. Patient is visibly improved from yesterday, speech is less tangential and he is more organized.    With discussion of why it might be beneficial for him to stay in the hospital longer and starting prazosin for his nightmares, patient agreed to stay in the hospital and to check in with the team tomorrow. He was agreeable to starting with a low dose of prazosin and increasing the dose if needed. Patient is also amenable to us talking with his mother to discuss the future plan.     ROS:  Patient has  foot pain  Patient denies acute concerns     Objective:     Vitals:  BP (!) 139/97 (BP Location: Left arm, Patient Position: Sitting)   Pulse 79   Temp 97.8  F (36.6  C) (Temporal)   Resp 14   Ht 1.727 m (5' 8\")   Wt 83 kg (183 lb)   SpO2 99%   BMI 27.83 kg/m      Allergies:  Allergies   Allergen Reactions    Penicillins Swelling       Current Medications:  Scheduled:  Current Facility-Administered Medications   Medication Dose Route Frequency    buprenorphine HCl-naloxone HCl  1 Film Sublingual Daily    And    buprenorphine HCl-naloxone HCl  1 Film Sublingual Daily    guanFACINE  1 mg Oral At Bedtime    losartan  25 mg Oral Daily    lurasidone  40 mg Oral Daily with supper    multivitamin w/minerals  1 tablet Oral Daily       PRN:  Current Facility-Administered Medications   Medication Dose Route Frequency    acetaminophen  650 mg Oral Q4H PRN    alum & mag hydroxide-simethicone  30 mL Oral Q4H PRN    hydrOXYzine  25 mg Oral Q4H PRN    melatonin  3 mg Oral At Bedtime PRN    nicotine  2 mg Buccal Q1H PRN    OLANZapine  10 mg Oral TID PRN    Or    OLANZapine  10 mg Intramuscular TID PRN    polyethylene glycol  17 g Oral Daily PRN    traZODone  50 mg Oral At Bedtime PRN       Labs and Imaging:  New results:   No results found for this or any previous visit (from the past 24 hour(s)).    Data this " "admission:  - CBC unremarkable  - CMP unremarkable  - TSH normal  - UDS positive for cannabinoids  - Vit D normal  - Hgb A1c normal, 5.4  - Triglycerides elevated to 193   - Vit B12 normal  - Folate normal  - EKG normal sinus rhythm        Mental Status Exam:     Oriented to:  Person/Self and Situation  General:  Awake and Alert  Appearance:  appears stated age  Behavior/Attitude:  Cooperative, Engaged, Easy to redirect and Easily distracted  Eye Contact: Downcast and Glances  Psychomotor: No evidence of tics, dystonia, or tardive dyskinesia  and Restless no catatonia present  Speech:  appropriate volume/tone and talkative  Language: Fluent in English with appropriate syntax and vocabulary.  Mood:  \"well\"  Affect:  congruent with mood and anxious  Thought Process:  rambling, disorganized but improved from prior  Thought Content:    denies VH, No SI, and No HI; No apparent delusions  Associations:  questionable  Insight:  partial due to ability to talk about symptoms, improved from prior interview   Judgment:  partial due to psychosis  Impulse control: fair  Attention Span:  adequate for conversation  Concentration:  grossly intact  Recent and Remote Memory:  grossly intact  Fund of Knowledge: average  Muscle Strength and Tone: normal  Gait and Station: Normal     Psychiatric Assessment     Markus Mosley is a 37 year old male with previous psychiatric diagnoses of schizoaffective disorder, anxiety, ADHD, polysubstance abuse (alcohol, cannabis,  benzodiazepines, opiates) and PTSD admitted from the ER on 10/20/2023 due to concern for psychosis in the context of psychosocial stressors including romantic issues, legal issues, loss, and chronic mental health issues. Most recent psychiatric hospitalization was last year at [facility]. Significant symptoms on admission include feeling paranoid delusions and restless and agitated from increased anxiety. The MSE on admission was pertinent for bizarre affect, loose " associations, disorganized thought and speech and reports of paranoid delusions. Biological contributions to mental health presentation include chronic mental health conditions including schizophrenia, non adherence to psychiatric medications, prior history of substance use and contributing medical conditions such as hypertension. Psychological contributions to mental health presentation include lack of insight and difficulty with coping mechanisms with recent loss of sibling. Social factors contributing to mental health presentation include employment instability, legal concerns, recent breakup and recent change in housing. Protective factors include mother as support system, access and engagement in care.      In summary, the patient's reported symptoms of paranoid delusions, restlessness, disorganized thought and speech in the context of increased social stressors and inconsistency in medication adherence are consistent with schizoaffective disorder. He will likely benefit from medication management this admission.     Given that he currently has psychosis, patient warrants inpatient psychiatric hospitalization to maintain his safety.      Psychiatric Plan by Diagnosis      Today's changes:  - start prazosin 2 mg qd     # Schizoaffective disorder  1. Medications:  - latuda 40 mg     2. Pertinent Labs/Monitoring:   - EKG normal sinus rhythm      3. Additional Plans:  - Patient will be treated in therapeutic milieu with appropriate individual and group therapies as described      # Generalized Anxiety Disorder  # ADHD  # PTSD  # Polysubstance Use Disorder  - Per chart review  - PTA suboxone  - PTA guanfacine  - hydroxyzine prn  - prazosin 2 mg qd       Psychiatric Hospital Course:      Markus Mosley was admitted to [facility] as a voluntary patient.   Medications:  PTA Suboxone was continued.   Latuda 20mg was started on 10/21/23. Increased to 40 mg 10/23.  Prazosin 2mg was started on 10/24/23.      The  risks, benefits, alternatives, and side effects were discussed and understood by the patient.        Medical Assessment and Plan     Medical diagnoses to be addressed this admission:    # HTN  -Losartan 25 mg daily        Medical course:Patient was physically examined by the ED prior to being transferred to the unit and was found to be medically stable and appropriate for admission.     Consults: none     Checklist     Legal Status: Voluntary     Safety Assessment:   Behavioral Orders   Procedures    Assault precautions    Code 1 - Restrict to Unit    Routine Programming     As clinically indicated    Status 15     Every 15 minutes.       Risk Assessment:  Risk for harm is moderate.  Risk factors: trauma, impulsive and past behaviors  Protective factors: family and engaged in treatment     SIO: no    Disposition:  Pending stabilization, medication optimization, & development of a safe discharge plan.         Attestations       This patient was seen and discussed with my attending physician.  Urban Heller, MS3  [facility] Medical Student     I was present with the medical student who participated in the service and in the documentation of the note.  I have verified the history and personally performed the physical exam and medical decision making. I agree with the assessment and plan of care as documented in the note.    Carol Felder MD MPH  PGY1 Psychiatry Resident Physician    This patient has been seen and evaluated by me, Alex Flores.  I have discussed this patient with the psychiatry resident and I agree with the findings and plan in this note.    I have reviewed today's vital signs, medications, labs and imaging.     Alex Oliva MD

## 2023-10-24 NOTE — TELEPHONE ENCOUNTER
Summary of Patient Care Communication Handoff to Patient Navigator Coordinator    PATIENT'S NAME: Markus Mosley  MRN:   [id number]  :   1985    DATE OF SERVICE: 10/24/23    Client Population Served: Adult MH IOP    What is the primary referral indicator (helps select tracks for patients - ALL PROGRAMS PLEASE FILL OUT)? Psychosis    If Referring to Adult MH Programmatic Care - what is the preference for programming (afternoon/morning)? 6B group    Is this a [facility] Referral?  No   (If yes, complete .LPSCREEN)    Diagnostic Assessment/Comprehensive Assessment was completed:  Yes    Are there any potential barriers for entrance into programmatic care (ALL PROGRAMS PLEASE FILL OUT)? Transportation and he reports work potentially in future    Level of Care Recommendations: Other Referrals Needed:NA    Specialty Care Coordinator Referral Needed:  No    Mental Health Referral Needed: No    Release of Information Needed: no    Faxing Needed: No    Follow up Requests:  Patient Navigator Coordinator Follow-Up Needed: Referral to designated [place] [facility].    Comments: Please follow up to get him on the wait list for 6B and provide him welcome letter for program and other community resources for other options for IOP such as [facility], [facility]. He would like other community resources for programming options.    Laura Wilson, [facility], [facility] [place]        Patient Navigator Coordinator Contact Information  Pool Message: [email or web address] ([phone])   Phone:  [phone]  Fax:  [phone]  Email:  [email or web address]

## 2023-10-24 NOTE — CONSULTS
DA IP Consult Completed    The writer met with the patient and completed DA and recommendation is for ADT/IOP psychosis 6B track. The writer discussed program with patient. Patient agreed to be added to wait list. The writer will send referral to The Patient Navigator Coordination Team for following up with patient to provide program information and discuss admission with patient. The writer discussed with covering CTC the recommendation and any follow up needs should be directed to The Patient Navigator Coordination Team.       The Patient Navigator Coordination Team  A Patient Navigation Coordinator will contact you within 48 hours to complete your admission or referral. Please contact them directly if you have any questions.  Phone: [phone]  Fax: [phone]  E-mail: [email or web address]    RUPAL Murguia, [facility] [place][note 2]

## 2023-10-24 NOTE — CARE PLAN
10/23/23 5646   Group Therapy Session   Group Attendance attended group session   Time Session Began 1720   Time Session Ended 1800   Total Time (minutes) 40   Total # Attendees 8   Group Type psychotherapeutic   Group Topic Covered structured socialization;other (see comments)   Group Session Detail Process - strengths and processed anger management and coping with anxiety on the unit   Patient Response/Contribution cooperative with task;discussed personal experience with topic;left the group on several occasions;verbalizations were off topic   Patient Participation Detail pt said he was feeling too sedated. He was one of the members that was feeding off the venting about staff etc, writer cut that short and he left group early after the redirection. He appeared restless.

## 2023-10-25 PROBLEM — F51.5 NIGHTMARES: Chronic | Status: ACTIVE | Noted: 2023-10-25

## 2023-10-25 LAB
ATRIAL RATE - MUSE: 67 BPM
DIASTOLIC BLOOD PRESSURE - MUSE: NORMAL MMHG
INTERPRETATION ECG - MUSE: NORMAL
P AXIS - MUSE: 47 DEGREES
PR INTERVAL - MUSE: 132 MS
QRS DURATION - MUSE: 94 MS
QT - MUSE: 416 MS
QTC - MUSE: 439 MS
R AXIS - MUSE: 61 DEGREES
SYSTOLIC BLOOD PRESSURE - MUSE: NORMAL MMHG
T AXIS - MUSE: 37 DEGREES
VENTRICULAR RATE- MUSE: 67 BPM

## 2023-10-25 PROCEDURE — H2032 ACTIVITY THERAPY, PER 15 MIN: HCPCS

## 2023-10-25 PROCEDURE — 250N000013 HC RX MED GY IP 250 OP 250 PS 637

## 2023-10-25 PROCEDURE — 99232 SBSQ HOSP IP/OBS MODERATE 35: CPT | Mod: GC | Performed by: PSYCHIATRY & NEUROLOGY

## 2023-10-25 PROCEDURE — 250N000013 HC RX MED GY IP 250 OP 250 PS 637: Performed by: STUDENT IN AN ORGANIZED HEALTH CARE EDUCATION/TRAINING PROGRAM

## 2023-10-25 PROCEDURE — 124N000002 HC R&B MH UMMC

## 2023-10-25 RX ORDER — LURASIDONE HYDROCHLORIDE 60 MG/1
60 TABLET, FILM COATED ORAL
Qty: 30 TABLET | Refills: 0 | Status: SHIPPED | OUTPATIENT
Start: 2023-10-25 | End: 2023-11-09

## 2023-10-25 RX ORDER — BUPRENORPHINE AND NALOXONE 4; 1 MG/1; MG/1
FILM, SOLUBLE BUCCAL; SUBLINGUAL
Qty: 60 FILM | Refills: 0 | Status: CANCELLED | OUTPATIENT
Start: 2023-10-26 | End: 2023-11-25

## 2023-10-25 RX ORDER — PRAZOSIN HYDROCHLORIDE 2 MG/1
2 CAPSULE ORAL AT BEDTIME
Qty: 30 CAPSULE | Refills: 0 | Status: SHIPPED | OUTPATIENT
Start: 2023-10-25 | End: 2025-04-01

## 2023-10-25 RX ORDER — DIVALPROEX SODIUM 250 MG/1
250 TABLET, EXTENDED RELEASE ORAL DAILY
Status: CANCELLED | OUTPATIENT
Start: 2023-10-25

## 2023-10-25 RX ORDER — GUANFACINE 1 MG/1
1 TABLET, EXTENDED RELEASE ORAL AT BEDTIME
Qty: 30 TABLET | Refills: 0 | Status: SHIPPED | OUTPATIENT
Start: 2023-10-25 | End: 2023-11-24

## 2023-10-25 RX ADMIN — NICOTINE POLACRILEX 2 MG: 2 LOZENGE ORAL at 18:22

## 2023-10-25 RX ADMIN — GUANFACINE 1 MG: 1 TABLET, EXTENDED RELEASE ORAL at 20:01

## 2023-10-25 RX ADMIN — LOSARTAN POTASSIUM 25 MG: 25 TABLET, FILM COATED ORAL at 08:39

## 2023-10-25 RX ADMIN — NICOTINE POLACRILEX 2 MG: 2 LOZENGE ORAL at 09:53

## 2023-10-25 RX ADMIN — BUPRENORPHINE AND NALOXONE 1 FILM: 4; 1 FILM, SOLUBLE BUCCAL; SUBLINGUAL at 08:39

## 2023-10-25 RX ADMIN — NICOTINE POLACRILEX 2 MG: 2 LOZENGE ORAL at 08:40

## 2023-10-25 RX ADMIN — NICOTINE POLACRILEX 2 MG: 2 LOZENGE ORAL at 12:52

## 2023-10-25 RX ADMIN — Medication 3 MG: at 20:01

## 2023-10-25 RX ADMIN — NICOTINE POLACRILEX 2 MG: 2 LOZENGE ORAL at 15:13

## 2023-10-25 RX ADMIN — ACETAMINOPHEN 650 MG: 325 TABLET, FILM COATED ORAL at 03:05

## 2023-10-25 RX ADMIN — ACETAMINOPHEN 650 MG: 325 TABLET, FILM COATED ORAL at 08:40

## 2023-10-25 RX ADMIN — BUPRENORPHINE AND NALOXONE 1 FILM: 8; 2 FILM, SOLUBLE BUCCAL; SUBLINGUAL at 08:39

## 2023-10-25 RX ADMIN — NICOTINE POLACRILEX 2 MG: 2 LOZENGE ORAL at 19:38

## 2023-10-25 RX ADMIN — PRAZOSIN HYDROCHLORIDE 2 MG: 1 CAPSULE ORAL at 20:01

## 2023-10-25 RX ADMIN — ACETAMINOPHEN 650 MG: 325 TABLET, FILM COATED ORAL at 12:55

## 2023-10-25 RX ADMIN — LURASIDONE HYDROCHLORIDE 60 MG: 40 TABLET, COATED ORAL at 18:05

## 2023-10-25 RX ADMIN — MULTIPLE VITAMINS W/ MINERALS TAB 1 TABLET: TAB at 08:39

## 2023-10-25 ASSESSMENT — ACTIVITIES OF DAILY LIVING (ADL)
ADLS_ACUITY_SCORE: 40
ADLS_ACUITY_SCORE: 40
HYGIENE/GROOMING: INDEPENDENT
ADLS_ACUITY_SCORE: 40
ORAL_HYGIENE: INDEPENDENT
ADLS_ACUITY_SCORE: 40
ADLS_ACUITY_SCORE: 40
ORAL_HYGIENE: INDEPENDENT
ADLS_ACUITY_SCORE: 40
HYGIENE/GROOMING: INDEPENDENT
LAUNDRY: WITH SUPERVISION
ADLS_ACUITY_SCORE: 40
ADLS_ACUITY_SCORE: 40
LAUNDRY: WITH SUPERVISION
ADLS_ACUITY_SCORE: 40
DRESS: INDEPENDENT
ADLS_ACUITY_SCORE: 40
DRESS: INDEPENDENT;STREET CLOTHES

## 2023-10-25 NOTE — PROGRESS NOTES
10/25/23 1800   Group Therapy Session   Group Attendance attended group session   Time Session Began 1715   Time Session Ended 1805   Total Time (minutes) 45   Total # Attendees 7   Group Type recreation   Group Topic Covered relaxation techniques   Group Session Detail stress reduction   Patient Response/Contribution cooperative with task   Patient Participation Detail Pt actively participated in a structured Therapeutic Recreation group with a focus on leisure participation, socializing, and exercise. Pt participated in the guided exercise for the full duration of the group. Pt followed along, engaged in the guided chair exercise routine and added to the discussion prompts throughout the routine.  Pt was encouraged to use positive imagery with the deep breathing and stretching to foster relaxation, improves focus, and reduce stress.

## 2023-10-25 NOTE — PLAN OF CARE
10/24/23      Group Psychotherapy Session   Group Attendance attended group session   Time Session Began 17:15   Time Session Ended 18:15   Total Time (minutes) 60   Total # Attendees 6   Group Type Psychotherapy   Group Topic Covered Ways to address psychosis; insight orientation   Group Session Detail Group psychotherapy: This psychotherapy group focused on psychosis, specifically - insight orientation. Group reviewed the definition of psychosis based on the DSM V, its general symptoms, impacts, and how individuals may seek support with it. Facilitator guided questions and feedback around specific ways each participant might have some insight of psychosis when it is occurring. Individual experiences were shared that were helpful for each participant to relate. Helpful tips were also reviewed including reality testing with professionals, family, staff, and support systems. Questions arose regarding how a credible support system could be identified. Facilitator guided responses regarding this - pointing each participant to identify their most trusted social support, following which the group discussed ways to engage such support when feeling confused.    Patient Response/Contribution This patient attended and stayed for the entire group. Asked questions and provided feedback.   Patient Participation Detail Stayed for the entire group.    Bartolome Braov, Ph.D., L.I.C.S.W.

## 2023-10-25 NOTE — PLAN OF CARE
"  Problem: Adult Behavioral Health Plan of Care  Goal: Adheres to Safety Considerations for Self and Others  Outcome: Progressing  Flowsheets (Taken 10/24/2023 2234)  Adheres to Safety Considerations for Self and Others: making progress toward outcome  Intervention: Develop and Maintain Individualized Safety Plan  Recent Flowsheet Documentation  Taken 10/24/2023 1802 by Shy Coburn RN  Safety Measures:   environmental rounds completed   safety rounds completed   Goal Outcome Evaluation:    Plan of Care Reviewed With: patient      Pt appears restless and a bit anxious but overall seems to be improving in reduction in psych symptoms from admission. Speech has improved, less rambling. He is taking his medication without any difficulties. He is attending community group and cooperative with task. Pt is occasionally respond to internal stimuli, he continues talking to self on occasion. Pt paces about the unit, in and out of his room most of the evening. Pt denies auditory and visual hallucination but told writer, him and his \"imaginary friend are planning to escape.\" Pt said he would use his shoulder to force open the door but he does not want to hurt his should. Pt said \"I'm going to go through the ceiling, then jump over the door.\" Pt expressed he wants to go home but his mother wants him to stay \" my mom is a though lady. She think I'm sick but I'm not.\" Pt is on elopement precaution. Pt did not attempts to exit unit. Parents visited this evening. He reports visit went well. Pt denies having pain or any other acute distress. No safety concerns. Prn nicotine gum given x 3.           "

## 2023-10-25 NOTE — PROGRESS NOTES
"Pt participated in a very active session of dance/movement therapy (D/MT) initially stretching and gently warming up the body, but then opening physically for \"absorbing\" synchronous movements.  This level of connection allowed for a loosening of the creative expression into fun swinging motions, as well as affirmations of self in a self-grab-and-squeeze \"hug.\"  Other movement metaphors of strength and power also emerged, along with quick \"fancy footwork\" that expressed greater levels of lori.  He described this as \"sober lori\" and shared his plans for attending NA meetings, along with identifying six different people he can call in a crisis.  He was motivated by being able to offer assistance to others, too.  His closing remarks regarding the session were:  \"it was like exercise, only better!  I just feel better when I move and listen to music.\"         10/25/23 6470   Expressive Therapy   Therapy Type dance/movement   Minutes of Treatment 50       "

## 2023-10-25 NOTE — PLAN OF CARE
Adult Inpatient Safety Plan:     [facility] Adult Inpatient Mental Health Units           [address]                                Markus Mosley       SAFETY PLAN:  Step 1: Warning signs / cues (Thoughts, images, mood, situation, behavior) that a crisis may be developing:  Thoughts: Shame  Mood: Feeling grief, stress  Behaviors: Dissociating, using drugs/alcohol    Step 2: Coping strategies - Things I can do to take my mind off of my problems without contacting another person (relaxation technique, physical activity):  Distress Tolerance Strategies: STOP acronym, music therapy, video games, therapy, distraction  Physical Activities: Walking      Step 3: Remind myself of people and things that are important to me and worth living for:    Family  Self  [employer]    Step 4: When I am in crisis, I can ask these people to help me use my safety plan:   Name: Tiffanie     Name: Thierry   Name: Shirlene     Step 5: Making the environment safe:   I will remove alcohol and drugs, secure my medications, dispose of old medications, and identify supportive people      Step 6: Professionals or agencies I can contact during a crisis:  [facility] Crisis (COPE) Response - Adult [phone]  Therapist iDgna and Jennifer  Psychiatrist       If unable to maintain safety despite working your plan, call 911 or go to my nearest emergency department.         Patient helped develop this safety plan and agreed to use it when needed.  Pt has been given a copy of this plan.          Today s date:  October 25, 2023  Completed by Clinician Name/ Credentials: RENAE Cee, JOAQUIN        Adapted from Safety Plan Template 2008 Nydia Rogel and Kieran Rojo is reprinted with the express permission of the authors.  No portion of the Safety Plan Template may be reproduced without the express, written permission.  You can contact the authors at [email or web address] or [email or web address].

## 2023-10-25 NOTE — PLAN OF CARE
Pt appeared sleeping  for  6 hours without breathing difficulty.  Requested and received PRN Tylenol for headache at 0305 and was helpful.  No aggressive behavior or assault to others this shift.        Problem: Sleep Disturbance  Goal: Adequate Sleep/Rest  Outcome: Progressing     Problem: Psychotic Signs/Symptoms  Goal: Improved Behavioral Control (Psychotic Signs/Symptoms)  Outcome: Progressing   Goal Outcome Evaluation:

## 2023-10-25 NOTE — PLAN OF CARE
".Goal Outcome Evaluation:    Plan of Care Reviewed With: patient      Problem: Psychotic Signs/Symptoms  Goal: Optimal Cognitive Function (Psychotic Signs/Symptoms)  Outcome: Progressing  Note: Pt presented with an anxious, restless affect. Thought process remained disorganized and speech was rambling with odd remarks. Pt asked staff, \"What's behind those doors back there? Is that where they do all their research on us?\" He paced the swenson, attended group, socialized with a peer and played on the [employer] switch during the shift. Pt talked positively about taking latuda - stated it kept him calm with all the noise this morning. He received melatonin for sleep.      "

## 2023-10-25 NOTE — PROGRESS NOTES
"  10/25/23 1500    Group Therapy Session   Time Session Began 1025   Time Session Ended 1500   Total Time (minutes) 90   Total # Attendees 4-8   Group Type expressive therapy   Group Topic Covered balanced lifestyle;relaxation techniques;self-care activities;structured socialization;leisure exploration/use of leisure time   Group Session Detail Relaxation/ORBA   Patient Response/Contribution cooperative with task   Patient Participation Detail Engaged in extended Morning Music Relaxation group to decrease anxiety and promote wellness. Restless affect, in frequent motion, in and out of group room.       Also engaged in afternoon session where pts were given the opportunity to self-express on the handheld ORBA (beat-making equipment piece). Engaged well in experimenting with different sounds and expressions.   Does well with a hands-on approach.      Made some comments showing lack of insight, \"I just need music; the doctors don't understand that.\"  Also tended towards more odd and disorganized behaviors (holding the maraca up to his ear in a fascinated fashion, leaving cups and various personal information around the group room (which MT cued for him to .)    After session, pt commented to MT saying, \"thanks for coming, the group gave me....serenity.\"    He also mentioned discharging soon, which he was excited about.      "

## 2023-10-25 NOTE — PLAN OF CARE
BEH IP Unit Acuity Rating Score (UARS)  Patient is given one point for every criteria they meet.    CRITERIA SCORING   On a 72 hour hold, court hold, committed, stay of commitment, or revocation 0    Patient LOS on BEH unit exceeds 20 days 0  LOS: 5   Patient under guardianship, 55+, otherwise medically complex, or under age 11 0   Suicide ideation without relief of precipitating factors 0   Current plan for suicide 0   Current plan for homicide 0   Imminent risk or actual attempt to seriously harm another without relief of factors precipitating the attempt 0   Severe dysfunction in daily living (ex: complete neglect for self care, extreme disruption in vegetative function, extreme deterioration in social interactions) 1   Recent (last 7 days) or current physical aggression in the ED or on unit 0   Restraints or seclusion episode in past 72 hours 0   Recent (last 7 days) or current verbal aggression, agitation, yelling, etc., while in the ED or unit 0   Active psychosis 0   Need for constant or near constant redirection (from leaving, from others, etc).  0   Intrusive or disruptive behaviors 0   TOTAL 1

## 2023-10-25 NOTE — PROGRESS NOTES
"  ----------------------------------------------------------------------------------------------------------  [facility]  Psychiatry Progress Note  Hospital Day #5     Interim History:     The patient's care was discussed with the treatment team and chart notes were reviewed.    Vitals: VSS  Sleep: 6 hours (10/25/23 0600)  Scheduled medications:Took all scheduled medications as prescribed  Psychiatric PRN medications: hydroxyzine, trazadone    Staff Report:   No acute events or safety concerns overnight. Pt slept for 6 hours and received PRN Tylenol at 0305 for headache which was helpful. Pt stated that he thought prazosin helped with his night terrors but that he thinks it caused a headache. He expressed desire to leave although acknowledging that his mom wants him to stay in the hospital.     Subjective:     Patient Interview:  Markus Mosley was interviewed in his room. Patient feels pretty good today. Patient had fun dancing in the OT room earlier today. He feels clear and healthy, ready enough to go home, improvements from previous days. He still notes some feelings of \"scattered.\" Patient slept okay during the night and had night sweats but doesn't recall any nightmares. He states that he woke up with a slight headache but nothing unusual. He denies any visual hallucinations during the night.     Patient's parents visited yesterday, and they confirmed that he will not be going to the family cabin in [place] following discharge. He thinks that he will go stay with his mother in [place] after he leaves the hospital. He thinks that the isolation factor has played a large role in his mental health decline.     He says he met a alphonse at a rehab center ([facility]) for NA meetings. He also made some appointments on 10/27 and 11/1 at the [facility] with his psychiatrist and therapist, respectively. He is aware of all the hotline phone numbers in " "case things get worse with his mental health. He doesn't have a lock box for his medications but otherwise has everything set in place with his medications. Patient is open to continuing the Latuda after discharge, as he thinks that medication has helped \"take the edge off.\" He is open to increasing the dose of the Latuda, and he prefers to not know the side effects. Agrees with plan to discharge tomorrow morning.    His last request is that he would like to have the trazodone taken off of his med list as he very rarely uses it.     ROS:  Patient hasno bothersome physical symptoms  Patient denies acute concerns     Objective:     Vitals:  BP (!) 141/94 (BP Location: Left arm)   Pulse 80   Temp 97.6  F (36.4  C) (Temporal)   Resp 16   Ht 1.727 m (5' 8\")   Wt 83 kg (183 lb)   SpO2 96%   BMI 27.83 kg/m      Allergies:  Allergies   Allergen Reactions    Penicillins Swelling       Current Medications:  Scheduled:  Current Facility-Administered Medications   Medication Dose Route Frequency    buprenorphine HCl-naloxone HCl  1 Film Sublingual Daily    And    buprenorphine HCl-naloxone HCl  1 Film Sublingual Daily    guanFACINE  1 mg Oral At Bedtime    losartan  25 mg Oral Daily    lurasidone  40 mg Oral Daily with supper    multivitamin w/minerals  1 tablet Oral Daily    prazosin  2 mg Oral At Bedtime       PRN:  Current Facility-Administered Medications   Medication Dose Route Frequency    acetaminophen  650 mg Oral Q4H PRN    alum & mag hydroxide-simethicone  30 mL Oral Q4H PRN    hydrOXYzine  25 mg Oral Q4H PRN    melatonin  3 mg Oral At Bedtime PRN    nicotine  2 mg Buccal Q1H PRN    OLANZapine  10 mg Oral TID PRN    Or    OLANZapine  10 mg Intramuscular TID PRN    polyethylene glycol  17 g Oral Daily PRN    traZODone  50 mg Oral At Bedtime PRN       Labs and Imaging:  New results:   Recent Results (from the past 24 hour(s))   EKG 12-lead, complete    Collection Time: 10/24/23  1:44 PM   Result Value Ref Range " "   Systolic Blood Pressure  mmHg    Diastolic Blood Pressure  mmHg    Ventricular Rate 67 BPM    Atrial Rate 67 BPM    AR Interval 132 ms    QRS Duration 94 ms     ms    QTc 439 ms    P Axis 47 degrees    R AXIS 61 degrees    T Axis 37 degrees    Interpretation ECG       Sinus rhythm  Normal ECG  When compared with ECG of 06-JUL-2019 13:28,  No significant change was found         Data this admission:  - CBC unremarkable  - CMP unremarkable  - TSH normal  - UDS positive for cannabinoids  - Vit D normal  - Hgb A1c normal, 5.4  - Triglycerides elevated to 193   - Vit B12 normal  - Folate normal  - EKG normal sinus rhythm        Mental Status Exam:     Oriented to:  Person/Self and Situation  General:  Awake and Alert  Appearance:  appears stated age  Behavior/Attitude:  Cooperative, Engaged, Easy to redirect and Easily distracted  Eye Contact: Downcast and Glances  Psychomotor: No evidence of tics, dystonia, or tardive dyskinesia  and Restless no catatonia present  Speech:  appropriate volume/tone and talkative  Language: Fluent in English with appropriate syntax and vocabulary.  Mood:  \"pretty good\"  Affect:  congruent with mood and anxious  Thought Process:  rambling, disorganized but improved from prior  Thought Content:   No SI/HI/AH/VH; No apparent delusions  Associations:  questionable  Insight:  fair, due to ability to talk through symptoms, improved from prior interview   Judgment:  Good due to engagement with treatment  Impulse control: good  Attention Span:  adequate for conversation  Concentration:  grossly intact  Recent and Remote Memory:  grossly intact  Fund of Knowledge: average  Muscle Strength and Tone: normal  Gait and Station: Normal     Psychiatric Assessment     Markus Mosley is a 37 year old male with previous psychiatric diagnoses of schizoaffective disorder, anxiety, ADHD, polysubstance abuse (alcohol, cannabis,  benzodiazepines, opiates) and PTSD admitted from the ER on " 10/20/2023 due to concern for psychosis in the context of psychosocial stressors including romantic issues, legal issues, loss, and chronic mental health issues. Most recent psychiatric hospitalization was last year at [place]. Significant symptoms on admission include feeling paranoid delusions and restless and agitated from increased anxiety. The MSE on admission was pertinent for bizarre affect, loose associations, disorganized thought and speech and reports of paranoid delusions. Biological contributions to mental health presentation include chronic mental health conditions including schizophrenia, non adherence to psychiatric medications, prior history of substance use and contributing medical conditions such as hypertension. Psychological contributions to mental health presentation include lack of insight and difficulty with coping mechanisms with recent loss of sibling. Social factors contributing to mental health presentation include employment instability, legal concerns, recent breakup and recent change in housing. Protective factors include mother as support system, access and engagement in care.      In summary, the patient's reported symptoms of paranoid delusions, restlessness, disorganized thought and speech in the context of increased social stressors and inconsistency in medication adherence are consistent with schizoaffective disorder. He will likely benefit from medication management this admission.     Given that he currently has psychosis, patient warrants inpatient psychiatric hospitalization to maintain his safety.      Psychiatric Plan by Diagnosis      Today's changes:  - increase latuda from 40 mg to 60 mg     # Schizoaffective disorder  1. Medications:  - latuda 60 mg     2. Pertinent Labs/Monitoring:   - EKG normal sinus rhythm      3. Additional Plans:  - Patient will be treated in therapeutic milieu with appropriate individual and group therapies as described      # Generalized  Anxiety Disorder  # ADHD  # PTSD  # Polysubstance Use Disorder  - Per chart review  - PTA suboxone  - PTA guanfacine  - hydroxyzine prn  - prazosin 2 mg qd       Psychiatric Hospital Course:      Markus Mosley was admitted to [facility] as a voluntary patient.   Medications:  PTA Suboxone was continued.   Latuda 20 mg was started on 10/21/23. Increased to 40 mg 10/23. Increased to 60 mg 10/25.  Prazosin 2 mg was started on 10/24/23.      The risks, benefits, alternatives, and side effects were discussed and understood by the patient.        Medical Assessment and Plan     Medical diagnoses to be addressed this admission:    # HTN  -Losartan 25 mg daily      Medical course:Patient was physically examined by the ED prior to being transferred to the unit and was found to be medically stable and appropriate for admission.     Consults: none     Checklist     Legal Status: Voluntary     Safety Assessment:   Behavioral Orders   Procedures    Assault precautions    Code 1 - Restrict to Unit    Routine Programming     As clinically indicated    Status 15     Every 15 minutes.       Risk Assessment:  Risk for harm is moderate.  Risk factors: trauma, impulsive and past behaviors  Protective factors: family and engaged in treatment     SIO: no    Disposition:  Pending stabilization, medication optimization, & development of a safe discharge plan.         Attestations       This patient was seen and discussed with my attending physician.  Urban Heller, MS3  [facility] Medical Student     I was present with the medical student who participated in the service and in the documentation of the note.  I have verified the history and personally performed the physical exam and medical decision making. I agree with the assessment and plan of care as documented in the note.    Carol Felder MD MPH  PGY1 Psychiatry Resident Physician    This patient has been seen and evaluated by me, Alex Flores.  I have discussed this patient  with the psychiatry resident and I agree with the findings and plan in this note.    I have reviewed today's vital signs, medications, labs and imaging.     Alex Oliva MD on 10/25/2023 at 7:20 PM

## 2023-10-25 NOTE — PLAN OF CARE
Team Note Due:  Monday    Assessment/Intervention/Current Symtoms and Care Coordination:  Chart review and met with team, discussed pt progress, symptomology, and response to treatment.  Discussed the discharge plan and any potential impediments to discharge.    -Writer met with pt who reports improvement in symptoms, pt feels ready to discharge. Pt was able to check his phone for appts and relayed to writer he has one with his psychiatrist 10/27 and on 11/1 with his therapist. Pt also received an IOP assessment yesterday and was placed on the wait list for ADT/IOP psychosis 6B track, pt is next in line on the wait list. Pt was also assigned a navigator to contact him after discharge. Navigator and appointment info was added to pt's AVS. Pt completed Safety Plan with [facility] therapist       Discharge Plan or Goal:  Home with parents     Barriers to Discharge:  Symptom stabilization     Referral Status:  None at this time     Legal Status:  Voluntary       Contacts:  Pat (mother): [phone]/ Father- [phone]     Upcoming Meetings and Dates/Important Information and next steps:  Pt possibly discharging tomorrow

## 2023-10-25 NOTE — PLAN OF CARE
"Goal Outcome Evaluation:    Plan of Care Reviewed With: patient      Problem: Psychotic Signs/Symptoms  Goal: Optimal Cognitive Function (Psychotic Signs/Symptoms)  Outcome: Progressing  Note: Pt described his mood as \"joyful.\"  He stated he was sad when he woke up and realized he was here. Pt reported he is \"ready to go today.\" He continued to present with an anxious, restless affect, disorganized thought process and tangential speech. Pt stated, \"I had night sweats last night, not night terrors; those are gone now. They gave me medication for them, but I think that medication caused my headache.\" Pt requested and received tylenol x2 for pain. When asked about auditory hallucinations, pt stated, \"I don't hear voices.\" Writer asked if he heard sounds and pt responded, \"sometimes, but they're not bad sounds.\" When loud hammering was occurring from construction on the unit above, pt stated, \"there's people in the vents, in the ceiling.\" He attended groups during the shift. Pt requested information on latuda (\"but not the side effects\"), information provided to pt.    "

## 2023-10-26 VITALS
TEMPERATURE: 97.6 F | WEIGHT: 186.5 LBS | RESPIRATION RATE: 16 BRPM | SYSTOLIC BLOOD PRESSURE: 145 MMHG | OXYGEN SATURATION: 97 % | BODY MASS INDEX: 28.26 KG/M2 | DIASTOLIC BLOOD PRESSURE: 94 MMHG | HEIGHT: 68 IN | HEART RATE: 74 BPM

## 2023-10-26 PROCEDURE — 99239 HOSP IP/OBS DSCHRG MGMT >30: CPT | Mod: GC | Performed by: PSYCHIATRY & NEUROLOGY

## 2023-10-26 PROCEDURE — 250N000013 HC RX MED GY IP 250 OP 250 PS 637

## 2023-10-26 PROCEDURE — 250N000013 HC RX MED GY IP 250 OP 250 PS 637: Performed by: STUDENT IN AN ORGANIZED HEALTH CARE EDUCATION/TRAINING PROGRAM

## 2023-10-26 RX ADMIN — LOSARTAN POTASSIUM 25 MG: 25 TABLET, FILM COATED ORAL at 08:25

## 2023-10-26 RX ADMIN — BUPRENORPHINE AND NALOXONE 1 FILM: 4; 1 FILM, SOLUBLE BUCCAL; SUBLINGUAL at 08:25

## 2023-10-26 RX ADMIN — NICOTINE POLACRILEX 2 MG: 2 LOZENGE ORAL at 08:42

## 2023-10-26 RX ADMIN — MULTIPLE VITAMINS W/ MINERALS TAB 1 TABLET: TAB at 08:25

## 2023-10-26 RX ADMIN — ACETAMINOPHEN 650 MG: 325 TABLET, FILM COATED ORAL at 08:25

## 2023-10-26 RX ADMIN — NICOTINE POLACRILEX 2 MG: 2 LOZENGE ORAL at 10:08

## 2023-10-26 RX ADMIN — BUPRENORPHINE AND NALOXONE 1 FILM: 8; 2 FILM, SOLUBLE BUCCAL; SUBLINGUAL at 08:25

## 2023-10-26 ASSESSMENT — ACTIVITIES OF DAILY LIVING (ADL)
ADLS_ACUITY_SCORE: 40
HYGIENE/GROOMING: INDEPENDENT
ADLS_ACUITY_SCORE: 40
ORAL_HYGIENE: INDEPENDENT
DRESS: INDEPENDENT
ADLS_ACUITY_SCORE: 40
LAUNDRY: WITH SUPERVISION

## 2023-10-26 NOTE — PLAN OF CARE
"Problem: Adult Behavioral Health Plan of Care  Goal: Adheres to Safety Considerations for Self and Others  Outcome: Progressing     Pt is pleasant with an appropriate affect and states \"my mood's a 10. I'm always in a good mood. Actually, sometimes I'm in a bad mood but today I'm in a good mood\". He attends group and is appropriately social with other peers. Pt requested and received PRN Tylenol 650mg @ 0825 d/t \"My whole body hurts, I was dancing a lot yesterday\". Pt also received PRN Nicotine lozenges 2mg upon request. He denies all SI/SIB/HI/AH/VH.   "

## 2023-10-26 NOTE — DISCHARGE SUMMARY
"                                                                                                                 ----------------------------------------------------------------------------------------------------------  [facility]   Psychiatric Discharge Summary      Markus Mosley MRN# [id number]   Age: 37 year old YOB: 1985     Date of Admission:  10/18/2023  Date of Discharge:  10/26/2023 11:05 AM  Admitting Physician:  Zarina Bentley MD  Discharge Physician:  Alex Oliva MD    This document serves as a transfer of care to Markus Mosley's outpatient providers.     Events Leading to Hospitalization:     \"Markus Mosley is a 37 year old male with previous psychiatric diagnoses of schizoaffective disorder, anxiety, ADHD, polysubstance abuse (alcohol, cannabis,  benzodiazepines, opiates) and PTSD admitted from the ER on 10/20/2023 due to concern for psychosis in the context of psychosocial stressors including romantic issues, legal issues, loss, and chronic mental health issues.     [facility]/DEC Assessment:  Pt is a 37 year old White male with a history of schizophrenia, depression, anxiety, suicidal ideations, ADHD and KIMBERLY.  Pt was brought to the ER today by his mom due to worsening of depression, anxiety and paranoia.  Pt also complained of having increased sensitivity to the sound or noise in surroundings which triggered his anxiety.  Pt had pressured, tangential and disorganized speech.  Pt appeared to be distracted and agitated as he moved around, sitting, standing and moved around the iPAD/cart.  Pt remarked, \"I'm having buch of crazy shit going on, figure something out, fucking weired is happening to me, I can't stop crying, I was so numb, yesterday I got two pieces of paint on me, there is weird sound going on, I feel very very powerful, I feel like I am growing stronger, my feet are growing, I am not fucking " "kidding you, I feel stupid.\"  Pt shared he was in a bad relationship with his partner and he recently moved back to his parents' house about 2 weeks ago.  Pt endorsed increased depression, cry, flashbacks, isolation, worry, racing thoughts and anxiety.  Pt shared he mostly worried about disappointing his parents.  Pt reported having poor sleep and loss of appetite.  Pt endorsed paranoia as he felt someone was watching him and people were trying to poison him.  Pt denied having auditory hallucination but endorsed visual hallucination of seeing both positive and negative energies.  Pt denied having suicidal and homicidal ideations.  Pt denied having access to firearms.  Pt endorsed SIB by cutting many years ago but reported recent scratching on his skin until it started to bleed.  Pt reported history of suicide attempt 2x by overdosing pills.  Pt complained he has no testosterone.  Pt identified losing his sister and friend who recently passed away as stressors in his life. Pt reported current established outpatient psychiatry for medication management and 2x weekly individual therapy service at [facility].  Pt reported he has not been taking his psychiatric medications consistently.  Pt reported history of multiple CD treatments including, [place], [facility] and [place].       ED/Hospital Course:  Markus Mosley was medically cleared for admission to inpatient psychiatric unit. He received Zyprexa 5mg prn several times and was observed to experience some relief. Suboxone treatment was continued. UDS showed positive cannabinoids. Was seen by [facility]  and inpatient psychiatry was recommended.      Patient interview:  Patient reports that he is feeling stressed because he is remembering being 'strapped down to a gurney' during his last hospitalization. Spends the next few minutes ruminating on this thought, and expressing difficulties with memory and speech in regards to this memory. He states " "that he does not like to be on medications, and also states that he would like for his medications to be aligned in order to feel better. He denies suicidal ideation, and denies auditory and visual hallucinations at this time. He endorses feeling confused. He states that he has tried seroquel 25mg before for sleep, and that he also takes vyvanse and gabapentin. Reports that he has been using THC daily, as it was prescribed to him, and that it helps him not have dreams, which is why he feels that it helps with his mental health. He feels he has flashbacks of the gurney and the times he has been on prison and the THC stops that, in addition to helping with his back pain. He reports that he has to take his medications otherwise he 'gets in trouble' and states that he has gone to prison 'for a lot of things' but does not elaborate on what. He endorses feeling like his mouth is dry and he is constipated. When asked if he has drank water today, gets up and goes towards his water cup and states that he is struggling to remember basic things such as drinking water. He reports that he struggles to sit still generally, but if he thinks about it, he is able to sit still. At different times, has endorsed paranoid delusions about people putting medications in his food, and being part of a secret government experiment on the unit. \"    See H&P by Zarina Bentley MD on 10/20/23 for additional details.      Diagnoses:   Primary Psychiatric Diagnosis  Schizoaffective disorder    Secondary Psychiatric Diagnoses  ELEN  Polysubstance use disorder (alcohol, cannabis, benzodiazepines, opiates)  ADHD  PTSD    Psychiatric Assessment:   Markus Mosley is a 37 year old male with previous psychiatric diagnoses of schizoaffective disorder, anxiety, ADHD, polysubstance abuse (alcohol, cannabis,  benzodiazepines, opiates) and PTSD admitted from the ER on 10/20/2023 due to concern for psychosis in the context of psychosocial stressors " including romantic issues, legal issues, loss, and chronic mental health issues. Most recent psychiatric hospitalization was last year at [place]. Significant symptoms on admission included feeling paranoid delusions and restless and agitated from increased anxiety. The MSE on admission was pertinent for bizarre affect, loose associations, disorganized thought and speech and reports of paranoid delusions. Biological contributions to mental health presentation included chronic mental health conditions including schizophrenia, non adherence to psychiatric medications, prior history of substance use and contributing medical conditions such as hypertension. Psychological contributions to mental health presentation included lack of insight and difficulty with coping mechanisms with recent loss of sibling. Social factors contributing to mental health presentation included employment instability, legal concerns, recent breakup and recent change in housing. Protective factors included mother as support system, access and engagement in care.      In summary, the patient's reported symptoms of paranoid delusions, restlessness, disorganized thought and speech in the context of increased social stressors and inconsistency in medication adherence were consistent with schizoaffective disorder.       Psychiatric Hospital Course:     Markus oMsley was admitted to [facility] as a voluntary patient. The patient was placed under status 15 (15 minute checks) to ensure patient safety.  Medication Trials and Changes: risks, benefits, alternatives, and side effects were discussed and understood by the patient and other caregivers (mother).  PTA Suboxone was continued.   Latuda 20 mg was started on 10/21/23. Increased to 40 mg 10/23. Increased to 60 mg 10/25. Good response to latuda; improved organization of thought process, improved rate of speech, decreased AVH, decreased overall distress related to psychotic symptoms.  Prazosin 2 mg  was started on 10/24/23 for nightmares.   Level of medication adherence: compliant  Behaviors: The patient was safe and appropriate, did not require chemical/physical restraints during admission. He was cooperative with cares, had good group attendance, and was visible in the milieu.  Change in psychiatric symptoms: Over the course of this hospitalization the patient's symptoms of psychosis improved.  Collateral information was obtained from mother and notable for a former partner who has a negative influence on the pt and has posed as his spouse previously.   Markus was released to home (with mother). At the time of discharge he was determined to not be a danger to himself or others.     Risk Assessment:      Today Markus Mosley denies thoughts of harming himself or others. Patient has notable risk factors for self-harm, including trauma, impulsive and past behaviors, recent loss of sister. However, risk is mitigated by commitment to family, sobriety, and ability to volunteer a safety plan. Therefore, based on all available evidence including the factors cited above, he does not appear to be at imminent risk for self-harm, does not meet criteria for a 72-hr hold, and therefore remains appropriate for ongoing outpatient level of care. Additional steps taken to minimize risk include: medication optimization, close psychiatric follow up and provision of crisis resources. Voluntary referral for [facility] was offered, he accepted this offer and is on their waiting list.      Psychiatric Examination:     Mental Status Exam:  Oriented to:  Person/Self and Situation  General:  Awake and Alert  Appearance:  appears stated age  Behavior/Attitude:  Cooperative, Engaged, Easy to redirect, Easily distracted, and Open  Eye Contact: Appropriate  Psychomotor: No evidence of tics, dystonia, or tardive dyskinesia  and Restless no catatonia present  Speech:  appropriate volume/tone and  "talkative  Language: Fluent in English with appropriate syntax and vocabulary.  Mood:  \"excited\"  Affect:  appropriate, congruent with mood and broad/full  Thought Process:  coherent, goal directed, and rambling  Thought Content:   No SI/HI/AH/VH; No apparent delusions  Associations:  intact  Insight:  good due to ability to talk through plans and desires for future  Judgment:  good due to engagement with treatment  Impulse control: good  Attention Span:  adequate for conversation  Concentration:  grossly intact  Recent and Remote Memory:  grossly intact  Fund of Knowledge: average  Muscle Strength and Tone: normal  Gait and Station: Normal     Medical Hospital Course:   Markus Mosley was medically cleared by the ED prior to admission to the unit.     Medical Diagnoses addressed:  # HTN  -Losartan 25 mg daily    Consults: none    Labs were notable for the following:  - CBC unremarkable  - CMP unremarkable  - TSH normal  - UDS positive for cannabinoids  - Vit D normal  - Hgb A1c normal, 5.4  - Triglycerides elevated to 193   - Vit B12 normal  - Folate normal  - EKG normal sinus rhythm      Discharge Medications:     Discharge Medication List as of 10/26/2023 10:17 AM        START taking these medications    Details   lurasidone (LATUDA) 60 MG TABS tablet Take 1 tablet (60 mg) by mouth daily (with dinner) for 30 days, Disp-30 tablet, R-0, E-Prescribe      prazosin (MINIPRESS) 2 MG capsule Take 1 capsule (2 mg) by mouth at bedtime for 30 days, Disp-30 capsule, R-0, E-Prescribe      buprenorphine HCl-naloxone HCl (SUBOXONE) 4-1 MG per film Place 1 Film under the tongue daily AND 1 Film daily. Do all this for 30 days., Disp-60 Film, R-0, E-Prescribe           CONTINUE these medications which have CHANGED    Details   guanFACINE (INTUNIV) 1 MG TB24 24 hr tablet Take 1 tablet (1 mg) by mouth at bedtime for 30 days, Disp-30 tablet, R-0, E-Prescribe           CONTINUE these medications which have NOT CHANGED    " Details   losartan (COZAAR) 25 MG tablet Take 25 mg by mouth daily, Historical      multivitamin w/minerals (THERA-VIT-M) tablet Take 1 tablet by mouth daily, Historical           STOP taking these medications       acetaminophen (TYLENOL) 500 MG tablet Comments:   Reason for Stopping:         Buprenorphine HCl-Naloxone HCl (SUBOXONE) 12-3 MG FILM per film Comments:   Reason for Stopping:         gabapentin (NEURONTIN) 100 MG capsule Comments:   Reason for Stopping:         hydrOXYzine (VISTARIL) 50 MG capsule Comments:   Reason for Stopping:         lisdexamfetamine (VYVANSE) 30 MG capsule Comments:   Reason for Stopping:         polyethylene glycol (MIRALAX) 17 g packet Comments:   Reason for Stopping:         QUEtiapine (SEROQUEL) 25 MG tablet Comments:   Reason for Stopping:         senna-docusate (SENOKOT-S/PERICOLACE) 8.6-50 MG tablet Comments:   Reason for Stopping:                Discharge Plan:   Medications as above  Psychiatric Appointments: Appointment Date/Time: Friday, 10/27 at 11am   Psychiatrist/Primary Care Giver: Franco Knowles /Dr. Moralez      Psychotherapy Appointments: Wednesday November 1st at 1pm   Therapist: Franco Knowles   Referrals: [facility] ([facility])   Medical follow up: Dr. Moralez, [facility]     Attestations:     Patient seen and staffed with my resident and attending physician.  Urban Heller, MS3  [facility] Medical Student     I was present with the medical student who participated in the service and in the documentation of the note.  I have verified the history and personally performed the physical exam and medical decision making. I agree with the assessment and plan of care as documented in the note.    This patient was seen and discussed with my attending physician.    Carol Felder MD MPH  PGY1 Psychiatry Resident Physician    The patient has been seen and evaluated by me, Alex Floers . I have examined the patient today and reviewed the discharge plan  with the resident. I agree with the final assessment and plan, as noted in the discharge summary. I have reviewed today's vital signs, medications, labs and imaging.    Total time discharge plannin minutes      Alex Oliva MD on 10/26/2023 at 9:18 PM

## 2023-10-26 NOTE — PROGRESS NOTES
Pt discharged at 1105. Pt's belongings were returned. Writer reviewed AVS with pt and pt verbalized understanding. Pt denies SI/HI, agrees to contract for safety out in the community. PA escorted pt out of the hospital.

## 2023-10-26 NOTE — PLAN OF CARE
Patient appeared to have slept for 4.75 hours during the safety checks. He woke up around 0400 hours, pacing the hallway and listening to headphones music. Patient reports he's looking forward to discharging today. No assault behaviors were noted or reported during this shift.     Problem: Sleep Disturbance  Goal: Adequate Sleep/Rest  Outcome: Progressing  Intervention: Promote Sleep/Rest  Recent Flowsheet Documentation  Taken 10/26/2023 0130 by Evie De Leon RN  Sleep/Rest Enhancement:   noise level reduced   room darkened   Goal Outcome Evaluation:

## 2023-11-09 ENCOUNTER — TELEPHONE (OUTPATIENT)
Dept: BEHAVIORAL HEALTH | Facility: CLINIC | Age: 38
End: 2023-11-09

## 2023-11-09 ENCOUNTER — TELEPHONE (OUTPATIENT)
Dept: BEHAVIORAL HEALTH | Facility: CLINIC | Age: 38
End: 2023-11-09
Payer: COMMERCIAL

## 2023-11-09 ENCOUNTER — HOSPITAL ENCOUNTER (EMERGENCY)
Facility: CLINIC | Age: 38
Discharge: STILL ADMITTED OR ADMINISTRATIVE | End: 2023-11-15
Attending: EMERGENCY MEDICINE | Admitting: EMERGENCY MEDICINE
Payer: COMMERCIAL

## 2023-11-09 DIAGNOSIS — F25.0 SCHIZOAFFECTIVE DISORDER, BIPOLAR TYPE (H): ICD-10-CM

## 2023-11-09 PROCEDURE — 99285 EMERGENCY DEPT VISIT HI MDM: CPT | Mod: 25 | Performed by: EMERGENCY MEDICINE

## 2023-11-09 PROCEDURE — 99417 PROLNG OP E/M EACH 15 MIN: CPT | Performed by: CLINICAL NURSE SPECIALIST

## 2023-11-09 PROCEDURE — 250N000013 HC RX MED GY IP 250 OP 250 PS 637: Performed by: EMERGENCY MEDICINE

## 2023-11-09 PROCEDURE — 99284 EMERGENCY DEPT VISIT MOD MDM: CPT | Performed by: EMERGENCY MEDICINE

## 2023-11-09 PROCEDURE — 99245 OFF/OP CONSLTJ NEW/EST HI 55: CPT | Performed by: CLINICAL NURSE SPECIALIST

## 2023-11-09 RX ORDER — BUPRENORPHINE AND NALOXONE 8; 2 MG/1; MG/1
1 FILM, SOLUBLE BUCCAL; SUBLINGUAL DAILY
Status: DISCONTINUED | OUTPATIENT
Start: 2023-11-10 | End: 2023-11-09

## 2023-11-09 RX ORDER — OLANZAPINE 10 MG/1
10 TABLET, ORALLY DISINTEGRATING ORAL ONCE
Status: COMPLETED | OUTPATIENT
Start: 2023-11-09 | End: 2023-11-09

## 2023-11-09 RX ORDER — LOSARTAN POTASSIUM 25 MG/1
25 TABLET ORAL DAILY
Status: DISCONTINUED | OUTPATIENT
Start: 2023-11-10 | End: 2023-11-15 | Stop reason: HOSPADM

## 2023-11-09 RX ORDER — BUPRENORPHINE HYDROCHLORIDE, NALOXONE HYDROCHLORIDE 12; 3 MG/1; MG/1
1 FILM, SOLUBLE BUCCAL; SUBLINGUAL DAILY
COMMUNITY
Start: 2023-09-20

## 2023-11-09 RX ORDER — PRAZOSIN HYDROCHLORIDE 2 MG/1
2 CAPSULE ORAL AT BEDTIME
Status: DISCONTINUED | OUTPATIENT
Start: 2023-11-09 | End: 2023-11-15 | Stop reason: HOSPADM

## 2023-11-09 RX ORDER — GUANFACINE 1 MG/1
1 TABLET, EXTENDED RELEASE ORAL AT BEDTIME
Status: DISCONTINUED | OUTPATIENT
Start: 2023-11-09 | End: 2023-11-15 | Stop reason: HOSPADM

## 2023-11-09 RX ORDER — PROPRANOLOL HYDROCHLORIDE 10 MG/1
10 TABLET ORAL 2 TIMES DAILY
Status: DISCONTINUED | OUTPATIENT
Start: 2023-11-09 | End: 2023-11-12

## 2023-11-09 RX ORDER — IBUPROFEN 600 MG/1
600 TABLET, FILM COATED ORAL EVERY 6 HOURS PRN
Status: DISCONTINUED | OUTPATIENT
Start: 2023-11-09 | End: 2023-11-14

## 2023-11-09 RX ORDER — MULTIPLE VITAMINS W/ MINERALS TAB 9MG-400MCG
1 TAB ORAL DAILY
Status: DISCONTINUED | OUTPATIENT
Start: 2023-11-10 | End: 2023-11-15 | Stop reason: HOSPADM

## 2023-11-09 RX ORDER — MELATONIN 10 MG
1 CAPSULE ORAL AT BEDTIME
COMMUNITY
Start: 2023-10-13 | End: 2024-05-07

## 2023-11-09 RX ORDER — BUPRENORPHINE AND NALOXONE 8; 2 MG/1; MG/1
1 FILM, SOLUBLE BUCCAL; SUBLINGUAL DAILY
Status: DISCONTINUED | OUTPATIENT
Start: 2023-11-10 | End: 2023-11-15 | Stop reason: HOSPADM

## 2023-11-09 RX ORDER — BUPRENORPHINE AND NALOXONE 4; 1 MG/1; MG/1
1 FILM, SOLUBLE BUCCAL; SUBLINGUAL DAILY
Status: DISCONTINUED | OUTPATIENT
Start: 2023-11-10 | End: 2023-11-15 | Stop reason: HOSPADM

## 2023-11-09 RX ORDER — BUPRENORPHINE AND NALOXONE 12; 3 MG/1; MG/1
1 FILM, SOLUBLE BUCCAL; SUBLINGUAL DAILY
Status: DISCONTINUED | OUTPATIENT
Start: 2023-11-10 | End: 2023-11-09 | Stop reason: ALTCHOICE

## 2023-11-09 RX ORDER — LISDEXAMFETAMINE DIMESYLATE 60 MG/1
60 CAPSULE ORAL DAILY
COMMUNITY
Start: 2023-09-28 | End: 2024-05-07

## 2023-11-09 RX ADMIN — PRAZOSIN HYDROCHLORIDE 2 MG: 2 CAPSULE ORAL at 20:10

## 2023-11-09 RX ADMIN — OLANZAPINE 10 MG: 10 TABLET, ORALLY DISINTEGRATING ORAL at 22:52

## 2023-11-09 RX ADMIN — GUANFACINE 1 MG: 1 TABLET, EXTENDED RELEASE ORAL at 20:12

## 2023-11-09 RX ADMIN — NICOTINE POLACRILEX 2 MG: 2 GUM, CHEWING ORAL at 17:15

## 2023-11-09 RX ADMIN — PROPRANOLOL HYDROCHLORIDE 10 MG: 10 TABLET ORAL at 22:52

## 2023-11-09 RX ADMIN — NICOTINE POLACRILEX 2 MG: 2 GUM, CHEWING ORAL at 20:13

## 2023-11-09 RX ADMIN — IBUPROFEN 600 MG: 600 TABLET ORAL at 17:15

## 2023-11-09 ASSESSMENT — ACTIVITIES OF DAILY LIVING (ADL)
ADLS_ACUITY_SCORE: 37
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
## 2023-11-09 NOTE — ED TRIAGE NOTES
"    Patient states he was discharged from hospital ~2 weeks ago where he was started on Latuda. Discharged with Rx for Latuda and patient feels it is \"making the voices in his  head louder and making my anxiety worse.\" Patient also stated that he had thoughts of cutting himself yesterday but states \"this is not normal for me.\" States he has been very sad, tearful \"just not feeling myself.\"  "

## 2023-11-09 NOTE — MEDICATION SCRIBE - ADMISSION MEDICATION HISTORY
Medication Scribe Admission Medication History    Admission medication history is complete. The information provided in this note is only as accurate as the sources available at the time of the update.    Information Source(s): Patient and CareEverywhere/SureScripts via in-person    Pertinent Information: N/A    Changes made to PTA medication list:  Added: suboxone, vyvanse, melatonin  Deleted: latuda  Changed: None    Medication Affordability:       Allergies reviewed with patient and updates made in EHR: yes    Medication History Completed By: Aline Blair 11/9/2023 5:42 PM    PTA Med List   Medication Sig Last Dose    guanFACINE (INTUNIV) 1 MG TB24 24 hr tablet Take 1 tablet (1 mg) by mouth at bedtime for 30 days 11/8/2023    lisdexamfetamine (VYVANSE) 60 MG capsule Take 60 mg by mouth daily Past Month    losartan (COZAAR) 25 MG tablet Take 25 mg by mouth daily 11/9/2023    Melatonin 10 MG CAPS Take 1 capsule by mouth at bedtime Past Week    multivitamin w/minerals (THERA-VIT-M) tablet Take 1 tablet by mouth daily 11/9/2023    prazosin (MINIPRESS) 2 MG capsule Take 1 capsule (2 mg) by mouth at bedtime for 30 days 11/8/2023    SUBOXONE 12-3 MG FILM per film Place 1 Film under the tongue daily 11/9/2023

## 2023-11-09 NOTE — ED PROVIDER NOTES
"ED Provider Note  [facility]      History     Chief Complaint   Patient presents with    Medication Problem     HPI  Markus Mosley is a 37 year old male with hx of schizoaffective disorder, ELEN, MDD who presents to the ED with family because he feels like his latuda isn't \"right.\"  He says ever since starting he feels anxious, sleep is down, his auditory hallucinations are worse and he feels \"elevated\" or almost manic.  He has seen his psychiatrist twice and both times they recommended he come to the ED.  Uses thc twice weekly.  Feels like crawling out of skin.  Hasn't taken vyvanse since being in hospital per recommendation of inpatient psychiatrist.  He has been living with parents since being discharged from inpatient which is a stressor.  He stopped taking latuda 2 days ago because of feeling \"not right\" on it.  He is on suboxone. He denies recent abuse of benzos or etoh.       Physical Exam   BP: 113/71  Pulse: 83  Temp: 98.2  F (36.8  C)  Resp: (!) 100  Height: 172.7 cm (5' 8\")  Weight: 92.2 kg (203 lb 4.8 oz)  SpO2: 100 %  Physical Exam  Vitals and nursing note reviewed.   HENT:      Head: Normocephalic and atraumatic.      Nose: No congestion or rhinorrhea.   Eyes:      Extraocular Movements: Extraocular movements intact.   Cardiovascular:      Rate and Rhythm: Normal rate.   Pulmonary:      Effort: Pulmonary effort is normal.   Musculoskeletal:         General: No deformity or signs of injury.      Cervical back: Normal range of motion.   Skin:     Coloration: Skin is not jaundiced or pale.   Neurological:      General: No focal deficit present.      Mental Status: He is alert and oriented to person, place, and time.   Psychiatric:         Attention and Perception: He perceives auditory hallucinations.         Mood and Affect: Mood is anxious.         Speech: Speech normal.         Behavior: Behavior normal. Behavior is cooperative.         Cognition and Memory: Cognition " and memory normal.           ED Course, Procedures, & Data      Procedures       Mental Health Risk Assessment        PSS-3      Date and Time Over the past 2 weeks have you felt down, depressed, or hopeless? Over the past 2 weeks have you had thoughts of killing yourself? Have you ever attempted to kill yourself? When did this last happen? User   11/09/23 1436 yes no yes more than 6 months ago NEP          C-SSRS (Routt)      Date and Time Q1 Wished to be Dead (Past Month) Q2 Suicidal Thoughts (Past Month) Q3 Suicidal Thought Method Q4 Suicidal Intent without Specific Plan Q5 Suicide Intent with Specific Plan Q6 Suicide Behavior (Lifetime) Within the Past 3 Months? RETIRED: Level of Risk per Screen Screening Not Complete User   11/09/23 1848 no no -- -- -- no -- -- -- ENF   11/09/23 1750 no no -- -- -- -- -- -- -- MG                Suicide assessment completed by mental health (D.E.C., LCSW, etc.)       No results found for any visits on 11/09/23.  Medications   ibuprofen (ADVIL/MOTRIN) tablet 600 mg (600 mg Oral $Given 11/9/23 1715)   nicotine (NICORETTE) gum 2 mg (2 mg Buccal $Given 11/9/23 1715)   guanFACINE (INTUNIV) 24 hr tablet 1 mg (has no administration in time range)   losartan (COZAAR) tablet 25 mg (has no administration in time range)   multivitamin w/minerals (THERA-VIT-M) tablet 1 tablet (has no administration in time range)   prazosin (MINIPRESS) capsule 2 mg (has no administration in time range)   buprenorphine HCl-naloxone HCl (SUBOXONE) 8-2 MG per film 1 Film (has no administration in time range)     And   buprenorphine HCl-naloxone HCl (SUBOXONE) 4-1 MG per film 1 Film (has no administration in time range)     Labs Ordered and Resulted from Time of ED Arrival to Time of ED Departure - No data to display  No orders to display          Critical care was not performed.     Medical Decision Making  The patient's presentation was of moderate complexity (a chronic illness mild to moderate  "exacerbation, progression, or side effect of treatment).    The patient's evaluation involved:  an assessment requiring an independent historian (luisa)  review of external note(s) from 1 sources (10/26/23 discharge)  discussion of management or test interpretation with another health professional (dec )    The patient's management necessitated high risk (a decision regarding hospitalization).    Assessment & Plan    Markus Mosley is a 37 year old male with hx of schizoaffective disorder, ELEN, MDD who presents to the ED with family because he feels like his latuda isn't \"right.\"  He was discharged from inpatient mental health 10/26/23 and was started on latuda. He says he has been feeling like he is crawling out of his skin/elevated almost manic and not right on the med. He has been struggling to sleep and says his auditory hallucinations are worsening.  No command hallucinations. He was seen by myself and the DEC  and his case was discussed.  Admission is recommended for stabilization. This is a voluntary admit.  Psychiatry consult requested.   Routine meds ordered while he boards in the ED.   I have reviewed the nursing notes. I have reviewed the findings, diagnosis, plan and need for follow up with the patient.    New Prescriptions    No medications on file       Final diagnoses:   Schizoaffective disorder, bipolar type (H)       Sondra Carbajal MD  [facility] EMERGENCY DEPARTMENT  11/9/2023     Sondra Carbajal MD  11/09/23 1931       Sondra Carbajal MD  11/09/23 1937    "

## 2023-11-10 ENCOUNTER — TELEPHONE (OUTPATIENT)
Dept: BEHAVIORAL HEALTH | Facility: CLINIC | Age: 38
End: 2023-11-10
Payer: COMMERCIAL

## 2023-11-10 PROCEDURE — 99255 IP/OBS CONSLTJ NEW/EST HI 80: CPT

## 2023-11-10 PROCEDURE — 250N000013 HC RX MED GY IP 250 OP 250 PS 637: Performed by: EMERGENCY MEDICINE

## 2023-11-10 PROCEDURE — 99418 PROLNG IP/OBS E/M EA 15 MIN: CPT

## 2023-11-10 PROCEDURE — 250N000013 HC RX MED GY IP 250 OP 250 PS 637

## 2023-11-10 PROCEDURE — 80307 DRUG TEST PRSMV CHEM ANLYZR: CPT | Performed by: EMERGENCY MEDICINE

## 2023-11-10 RX ORDER — HYDROXYZINE HYDROCHLORIDE 25 MG/1
25 TABLET, FILM COATED ORAL EVERY 6 HOURS PRN
Status: DISCONTINUED | OUTPATIENT
Start: 2023-11-10 | End: 2023-11-15 | Stop reason: HOSPADM

## 2023-11-10 RX ORDER — HALOPERIDOL 5 MG/ML
5 INJECTION INTRAMUSCULAR EVERY 8 HOURS PRN
Status: DISCONTINUED | OUTPATIENT
Start: 2023-11-10 | End: 2023-11-15 | Stop reason: HOSPADM

## 2023-11-10 RX ORDER — DIPHENHYDRAMINE HCL 25 MG
50 CAPSULE ORAL EVERY 8 HOURS PRN
Status: DISCONTINUED | OUTPATIENT
Start: 2023-11-10 | End: 2023-11-15 | Stop reason: HOSPADM

## 2023-11-10 RX ORDER — HYDROXYZINE HYDROCHLORIDE 25 MG/1
50 TABLET, FILM COATED ORAL EVERY 6 HOURS PRN
Status: DISCONTINUED | OUTPATIENT
Start: 2023-11-10 | End: 2023-11-15 | Stop reason: HOSPADM

## 2023-11-10 RX ORDER — HALOPERIDOL 5 MG/1
5 TABLET ORAL EVERY 8 HOURS PRN
Status: DISCONTINUED | OUTPATIENT
Start: 2023-11-10 | End: 2023-11-15 | Stop reason: HOSPADM

## 2023-11-10 RX ORDER — OLANZAPINE 10 MG/1
10 TABLET, ORALLY DISINTEGRATING ORAL AT BEDTIME
Status: DISCONTINUED | OUTPATIENT
Start: 2023-11-10 | End: 2023-11-15

## 2023-11-10 RX ORDER — OLANZAPINE 5 MG/1
5 TABLET, ORALLY DISINTEGRATING ORAL DAILY
Status: DISCONTINUED | OUTPATIENT
Start: 2023-11-11 | End: 2023-11-12

## 2023-11-10 RX ORDER — POLYETHYLENE GLYCOL 3350 17 G
2 POWDER IN PACKET (EA) ORAL
Status: DISCONTINUED | OUTPATIENT
Start: 2023-11-10 | End: 2023-11-14

## 2023-11-10 RX ORDER — OLANZAPINE 10 MG/1
10 TABLET, ORALLY DISINTEGRATING ORAL ONCE
Status: COMPLETED | OUTPATIENT
Start: 2023-11-10 | End: 2023-11-10

## 2023-11-10 RX ORDER — DIPHENHYDRAMINE HYDROCHLORIDE 50 MG/ML
50 INJECTION INTRAMUSCULAR; INTRAVENOUS EVERY 8 HOURS PRN
Status: DISCONTINUED | OUTPATIENT
Start: 2023-11-10 | End: 2023-11-15 | Stop reason: HOSPADM

## 2023-11-10 RX ADMIN — OLANZAPINE 10 MG: 10 TABLET, ORALLY DISINTEGRATING ORAL at 21:03

## 2023-11-10 RX ADMIN — PROPRANOLOL HYDROCHLORIDE 10 MG: 10 TABLET ORAL at 08:40

## 2023-11-10 RX ADMIN — NICOTINE POLACRILEX 2 MG: 2 GUM, CHEWING ORAL at 20:42

## 2023-11-10 RX ADMIN — NICOTINE POLACRILEX 2 MG: 2 GUM, CHEWING ORAL at 14:18

## 2023-11-10 RX ADMIN — PROPRANOLOL HYDROCHLORIDE 10 MG: 10 TABLET ORAL at 21:03

## 2023-11-10 RX ADMIN — OLANZAPINE 10 MG: 10 TABLET, ORALLY DISINTEGRATING ORAL at 10:53

## 2023-11-10 RX ADMIN — DIPHENHYDRAMINE HYDROCHLORIDE 50 MG: 25 CAPSULE ORAL at 13:10

## 2023-11-10 RX ADMIN — BUPRENORPHINE AND NALOXONE 1 FILM: 4; 1 FILM, SOLUBLE BUCCAL; SUBLINGUAL at 08:41

## 2023-11-10 RX ADMIN — NICOTINE POLACRILEX 2 MG: 2 GUM, CHEWING ORAL at 19:00

## 2023-11-10 RX ADMIN — MULTIPLE VITAMINS W/ MINERALS TAB 1 TABLET: TAB at 08:40

## 2023-11-10 RX ADMIN — NICOTINE POLACRILEX 2 MG: 2 GUM, CHEWING ORAL at 09:00

## 2023-11-10 RX ADMIN — HYDROXYZINE HYDROCHLORIDE 25 MG: 25 TABLET ORAL at 10:53

## 2023-11-10 RX ADMIN — NICOTINE POLACRILEX 2 MG: 2 LOZENGE ORAL at 17:24

## 2023-11-10 RX ADMIN — IBUPROFEN 600 MG: 600 TABLET ORAL at 17:24

## 2023-11-10 RX ADMIN — GUANFACINE 1 MG: 1 TABLET, EXTENDED RELEASE ORAL at 21:03

## 2023-11-10 RX ADMIN — IBUPROFEN 600 MG: 600 TABLET ORAL at 10:53

## 2023-11-10 RX ADMIN — HALOPERIDOL 5 MG: 5 TABLET ORAL at 13:10

## 2023-11-10 RX ADMIN — PRAZOSIN HYDROCHLORIDE 2 MG: 2 CAPSULE ORAL at 21:03

## 2023-11-10 RX ADMIN — BUPRENORPHINE AND NALOXONE 1 FILM: 8; 2 FILM, SOLUBLE BUCCAL; SUBLINGUAL at 08:41

## 2023-11-10 ASSESSMENT — ACTIVITIES OF DAILY LIVING (ADL)
ADLS_ACUITY_SCORE: 37

## 2023-11-10 NOTE — PLAN OF CARE
Markus JOSÉ LUIS Dimitri  November 9, 2023  Plan of Care Hand-off Note     Patient Care Path: inpatient mental health    Plan for Care:   Pt is struggling with sleep and anxiety, racing thoughts, feeling like he's losing his mind. Denies si, sib or hi, however his usual auditory noises now have increased in frequency and intensity to be 'worry voices'.  Says it is paralyzing to leave a room at times and at other times he feels like he is being stabbed.  Expressed feeling both confused and frustrated and thinks it is related to the Latuda he started 2-3 wks ago.  Recommendation is for admission for medication and sx stabilization.    Identified Goals and Safety Issues: Review medication side effects, stabilze sleep and anxiety sx.  Once stabilized might benefit from a PHP/IOP program.    Overview:  MomTamera [phone]  (although spoke with Aunt Mandi today)   DadSaulo [phone]        Legal Status: Legal Status at Admission: Voluntary/Patient has signed consent for treatment    Psychiatry Consult:  to be ordered by Dr Carbajal       Updated pt, family and MD regarding plan of care.           Donna Gambino, JOHANNSW

## 2023-11-10 NOTE — ED NOTES
Patient arrived to the [facility] unit at 1800. He arrived via wheelchair. Patient was calm and cooperative upon arrival. Patient was admitted to the ED due to Suicidal Ideation. Patient stated since she started taking Latuda he has been experiencing suicidal thoughts and confusions, agitations, and headaches. Patient stated he would like his medication changes due to the side effects. He currently rated pain headache 8/10. He denied SI/HI/SIB. Patient contracted for safety. Will continue to monitor for behaviors. Will continue to monitor for behaviors.

## 2023-11-10 NOTE — ED NOTES
Pt was noted w high energy and was pacing around the milieu for a majority of the morning. Pt hyperverbal continues to be paranoid about the police. Pt states he is unable to sit down and that he is having racing thoughts.Reports feeling agitated/ anxious. Rates anxiety 8/10. States zyprexa and hydroxyzine were ineffective. Pt continued to pace and speak loudly frequently came to the writer stating he is anxious and beginning to get angry. Haldol and Benadryl given and were effective. Pt was able to lay down and eventually went to sleep. Pt was med compliant. Denies any SI,HI,SIB. Able to contract for safety. Ate 100% of meals. C/o headache prn ibuprofen effective. Pt resting in bed.

## 2023-11-10 NOTE — PROGRESS NOTES
Triage & Transition Services, Extended Care     Markus Mosley  November 10, 2023    Markus is followed related to Long wait time for admission: 26+ hours in the ED . Please see initial DEC Crisis Assessment  for complete assessment information. Medical record is reviewed. Additional notes include: Anxiety, sleep disturbance, racing thoughts, paranoia, and hallucinations.      Writer spoke with psychiatry consult provider Chayito Wynne about care plan. Please see his note from 11/10/2023 for full details.     Plan:  Inpatient Mental Health: safety, stabilization and medication management.   Patient was seen by psychiatry consult and recommended for inpatient mental health admission.    If patient requests to discharge, due to acute symptoms wiht limited insight, it is recommended reassessment of patient's risk to determine if involuntary hospitalization is necessary.    Plan for Care reviewed with Assigned Medical Provider? Yes. Provider, Chayito Wynne, response: agrees    Extended Care will follow and meet with patient/family/care team as able or requested.     Heavenly Randhawa, [facility], Extended Care   [phone]

## 2023-11-10 NOTE — ED PROVIDER NOTES
Patient who is in the [facility], that was signed out to me by the night physician in the main emergency department at shift change.  The patient has been evaluated by mental health, completed a DEC assessment, and deemed in need of admission pending stabilization or other change in status.  Patient remains in the [facility] while awaiting inpatient bed placement.  The patient has been medically cleared, and home medications are being administered.  Any necessary psychiatric consultation has been ordered.  I will be available to manage this patient for any acute issues that should arise until the provider in the [facility] arrives to assume care today, at which time they will assume care of the patient.  I will continue to be available and address any pending mental health recommendations or medical issues, that should arise either by nursing, the psychiatric consultant, or by the behavioral extended care team, until the time of that signout.       Felipe Cardona MD  11/10/23 0754

## 2023-11-10 NOTE — TELEPHONE ENCOUNTER
[place]: [place]  Access Inpatient Bed Call Log 11/10/23 @ 1:00am   Intake has called facilities that have not updated their bed status within the last 12 hours.??      *METRO:  [place] -- [facility]: @ cap per website.  [place] -- [facility]:  @ cap per website.  [place] -- [facility]: POSTING 2 BEDS.  [place] -- [facility]:  @ cap per RN. Open for review for day shift, low acuity only.  [place] -- [facility]: POSTING 2 BEDS.  [facility] -- [facility]: @ cap per website.  [facility]/ beds - @ cap per website. Ages 18-25, Voluntary only, COVID test req'd, NO aggression, physical or sexual assault, violence hx or drug abuse  [place] -- [facility]: POSTING 1 BED.  [place] -- [facility]: @ cap per website.  [place] -- [facility]: POSTING 2 BEDS.     *STATEWIDE (by distance):  [facility] - POSTING 2 BEDS. Mixed unit/Low acuity only.   [facility] - POSTING 1 BED. Low acuity, No aggression  [facility] -- @ cap per website.   [facility] -- @ cap per website. Low acuity only. No current aggression.  [facility] - POSTING 1 BED. COVID negative test req. Lower acuity only  [facility] -- @ cap per website. Low acuity only. Prefer med adjustments placement.  [place] [facility] -- @ cap per website.  No aggression  [place] -- [facility]: POSTING 1 BED. NO reviews after 10PM. Low acuity only.  [place] -- [facility]: POSTING 3 BEDS.  No hx of aggression. No sexual offenders. Voluntary patients only.      Pt remains on waitlist pending appropriate placement availability
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
## 2023-11-10 NOTE — ED NOTES
Pt appeared to be sound asleep through thew night. Even respirations and body movements noted. Pt awakened for bathroom breaks a few times and went right back to sleep. No safety nor behavioral concerns this shift.
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
## 2023-11-11 ENCOUNTER — TELEPHONE (OUTPATIENT)
Dept: BEHAVIORAL HEALTH | Facility: CLINIC | Age: 38
End: 2023-11-11
Payer: COMMERCIAL

## 2023-11-11 PROCEDURE — 250N000013 HC RX MED GY IP 250 OP 250 PS 637

## 2023-11-11 PROCEDURE — 250N000013 HC RX MED GY IP 250 OP 250 PS 637: Performed by: STUDENT IN AN ORGANIZED HEALTH CARE EDUCATION/TRAINING PROGRAM

## 2023-11-11 PROCEDURE — 250N000013 HC RX MED GY IP 250 OP 250 PS 637: Performed by: EMERGENCY MEDICINE

## 2023-11-11 RX ORDER — OLANZAPINE 5 MG/1
5 TABLET, ORALLY DISINTEGRATING ORAL 3 TIMES DAILY PRN
Status: DISCONTINUED | OUTPATIENT
Start: 2023-11-11 | End: 2023-11-12

## 2023-11-11 RX ADMIN — HYDROXYZINE HYDROCHLORIDE 50 MG: 25 TABLET, FILM COATED ORAL at 03:20

## 2023-11-11 RX ADMIN — NICOTINE POLACRILEX 2 MG: 2 LOZENGE ORAL at 10:54

## 2023-11-11 RX ADMIN — GUANFACINE 1 MG: 1 TABLET, EXTENDED RELEASE ORAL at 21:00

## 2023-11-11 RX ADMIN — HYDROXYZINE HYDROCHLORIDE 50 MG: 25 TABLET, FILM COATED ORAL at 09:04

## 2023-11-11 RX ADMIN — PRAZOSIN HYDROCHLORIDE 2 MG: 2 CAPSULE ORAL at 21:04

## 2023-11-11 RX ADMIN — NICOTINE POLACRILEX 2 MG: 2 LOZENGE ORAL at 08:56

## 2023-11-11 RX ADMIN — OLANZAPINE 10 MG: 10 TABLET, ORALLY DISINTEGRATING ORAL at 21:00

## 2023-11-11 RX ADMIN — IBUPROFEN 600 MG: 600 TABLET ORAL at 03:26

## 2023-11-11 RX ADMIN — NICOTINE POLACRILEX 2 MG: 2 GUM, CHEWING ORAL at 03:26

## 2023-11-11 RX ADMIN — NICOTINE POLACRILEX 4 MG: 4 GUM, CHEWING BUCCAL at 18:23

## 2023-11-11 RX ADMIN — MULTIPLE VITAMINS W/ MINERALS TAB 1 TABLET: TAB at 08:44

## 2023-11-11 RX ADMIN — NICOTINE POLACRILEX 2 MG: 2 LOZENGE ORAL at 19:38

## 2023-11-11 RX ADMIN — NICOTINE POLACRILEX 2 MG: 2 LOZENGE ORAL at 21:06

## 2023-11-11 RX ADMIN — OLANZAPINE 5 MG: 5 TABLET, ORALLY DISINTEGRATING ORAL at 16:32

## 2023-11-11 RX ADMIN — NICOTINE POLACRILEX 2 MG: 2 LOZENGE ORAL at 15:59

## 2023-11-11 RX ADMIN — BUPRENORPHINE AND NALOXONE 1 FILM: 4; 1 FILM, SOLUBLE BUCCAL; SUBLINGUAL at 08:45

## 2023-11-11 RX ADMIN — NICOTINE POLACRILEX 2 MG: 2 LOZENGE ORAL at 17:22

## 2023-11-11 RX ADMIN — NICOTINE POLACRILEX 2 MG: 2 LOZENGE ORAL at 12:10

## 2023-11-11 RX ADMIN — NICOTINE POLACRILEX 2 MG: 2 LOZENGE ORAL at 13:57

## 2023-11-11 RX ADMIN — IBUPROFEN 600 MG: 600 TABLET ORAL at 15:59

## 2023-11-11 RX ADMIN — BUPRENORPHINE AND NALOXONE 1 FILM: 8; 2 FILM, SOLUBLE BUCCAL; SUBLINGUAL at 08:45

## 2023-11-11 RX ADMIN — OLANZAPINE 5 MG: 5 TABLET, ORALLY DISINTEGRATING ORAL at 08:43

## 2023-11-11 ASSESSMENT — ACTIVITIES OF DAILY LIVING (ADL)
ADLS_ACUITY_SCORE: 37

## 2023-11-11 NOTE — PROGRESS NOTES
Pt got up around 0315 pacing with headphone on and appears restless and manic. The patient asked for and received Ibuprofen 600 mg for headache and back pain. Pt reported back pain 9 out of 10. The patient also asked for and received nicotine gum. Pt was administered hydroxyzine for anxiety. Pt stated he is not going to be able to go back to sleep. PT appears irritable. The patient asked and received a snack.Will continue to monitor PT.

## 2023-11-11 NOTE — PROGRESS NOTES
Pt came to the nurse and stated he was agitated and requesting to get some olanzapine. PT appears manic and restless. Pt went to the big milieu and stated that extended milieu makes more depressing. The nurse called the provider to get an olanzapine order, and 5 mg was administered to the patient.

## 2023-11-11 NOTE — ED NOTES
Markus's parents were here krishna and asked Heavenly to meet with Markus and at least introduce herself to Markus. The patient reported that he had never met with Heavenly.

## 2023-11-11 NOTE — ED NOTES
Pt pacing around in the main milieu. Reports feeling anxious and agitated. Endorses auditory hallucinations voices are telling him to leave the unit. Denies any visual hallucinations, SI,HI. PRN hydroxyzine given. Pt contracts for safety. Med compliant. Ate 100% of meals.

## 2023-11-11 NOTE — PROGRESS NOTES
"Triage & Transition Services, Extended Care     Therapy Progress Note & Reassessment    Patient: Markus goes by \"Markus,\" uses he/him pronouns  Date of Service: November 11, 2023  Site of Service: [facility]  Patient was seen in-person.     Presenting problem:   Markus is followed related to Long wait time for admission:   . Please see initial DEC/[facility] Crisis Assessment completed by Donna Gambino on 11/9/23 for complete assessment information. Notable concerns include Anxiety, Worsening psychosocial stress, Health stressors .     Individuals Present: Markus & Nael Rice    Session start: 2:54pm  Session end: 3:38pm  Session duration in minutes: 44  Session number: 1  Anticipated number of sessions or this episode of care: 1  CPT utilized: 06045 - Psychotherapy (with patient) - 45 (38-52*) min    Current Presentation:   Pt presents as manic with pressured/tangential speech, restlessness, flight of ideas, poor concentration, and elevated mood. Throughout the session pt could not sit still, paced the consult room, and sat in multiple chairs; moreover, pt reported the sound of heater made him very paranoid. Pt reports history of complex trauma and substance use. Pt reports that recent grief from losing sister, best friend, and aunt have exacerbated his mental health symptoms. Pt reports recent disorganized thinking and hallucinations have increased his paranoia resulting in agitation and hypervigilance. Pt reports coming to hospital for stabilization. Pt endorses functional impairment and distress caused by recent increase in symptoms. Pt reports outpatient providers have been helpful but needed higher level of care due to recent dysregulation. Pt engaged in safety planning and reported no current SI, SIB or HI.      Mental Status Exam:   Appearance: awake, alert  Attitude: cooperative  Eye Contact: good  Mood:  elevated  Affect: mood congruent and intensity is exaggerated  Speech: pressured speech and " rambling  Psychomotor Behavior: fidgeting  Thought Process:  disorganized and tangental  Associations: loosening of associations present  Thought Content: no evidence of suicidal ideation or homicidal ideation  Insight: good  Judgement: intact  Oriented to: time, person, and place  Attention Span and Concentration: intact  Recent and Remote Memory: intact    Diagnosis:   Schizoaffective disorder, bipolar type (H) F25.0       Therapeutic Intervention(s):   Provided active listening, unconditional positive regard, and validation. Engaged in safety planning.  Explored strategies for self-soothing.     Treatment Objective(s) Addressed:   The focus of this session was on safety planning and assessing safety.     Progress Towards Goals:   Patient reports improving symptoms with less reported confusion. Patient is making progress towards treatment goals as evidenced by engagement in safety planning.     General Recommendations:   Continue to monitor for harm. Consider: Use a positive, direct and calm approach. Pt's tend to match the energy/mood of the staff. Keep focus positive and upbeat    Plan:   Inpatient Mental Health: no change to plan; continued recommendation for inpatient mental health due to acuity of mental health symptoms including magali and psychosis. Pt would benefit from therapeutic setting of inpatient for continued stabilization. Pt wants inpatient admission and believes it would help him get well.    Plan for Care reviewed with Assigned Medical Provider? Yes. Provider. response: agreed     Nael Rice   Licensed Mental Health Professional (LMHP), [employer]  [phone]

## 2023-11-11 NOTE — ED PROVIDER NOTES
[facility] ED Mental Health Handoff Note:       Brief HPI:  This is a 37 year old male signed out to me by the previous provider.  See initial ED Provider note for full details of the presentation.   Interval history is pertinent for no significant changes.  Nursing requested increasing his nicotine up to 4 mg which was done    Home meds reviewed and ordered/administered: Yes    Medically stable for inpatient mental health admission: Yes    Evaluated by mental health: Yes    Safety concerns: At the time I received sign out, there were no safety concerns.    Hold Status:  Active Orders   N/A       Exam:   Patient Vitals for the past 24 hrs:   BP Temp Temp src Pulse Resp SpO2   11/11/23 0828 115/70 97.4  F (36.3  C) Oral 56 19 99 %   11/10/23 2045 108/70 98.8  F (37.1  C) Oral 61 18 98 %       GEN: resting in bed, calm  PULM: breathing comfortably, in no respiratory distress  PSYCH: Calm and cooperative, interactive    ED Course:    Medications   ibuprofen (ADVIL/MOTRIN) tablet 600 mg (600 mg Oral $Given 11/11/23 0326)   guanFACINE (INTUNIV) 24 hr tablet 1 mg (1 mg Oral $Given 11/10/23 2103)   losartan (COZAAR) tablet 25 mg ( Oral Automatically Held 11/16/23 0800)   multivitamin w/minerals (THERA-VIT-M) tablet 1 tablet (1 tablet Oral $Given 11/11/23 0844)   prazosin (MINIPRESS) capsule 2 mg (2 mg Oral $Given 11/10/23 2103)   buprenorphine HCl-naloxone HCl (SUBOXONE) 4-1 MG per film 1 Film (1 Film Sublingual $Given 11/11/23 0845)   buprenorphine HCl-naloxone HCl (SUBOXONE) 8-2 MG per film 1 Film (1 Film Sublingual $Given 11/11/23 0845)   propranolol (INDERAL) tablet 10 mg (0 mg Oral Hold 11/11/23 0836)   OLANZapine zydis (zyPREXA) ODT tab 10 mg (10 mg Oral $Given 11/10/23 2103)   OLANZapine zydis (zyPREXA) ODT tab 5 mg (5 mg Oral $Given 11/11/23 0843)   haloperidol lactate (HALDOL) injection 5 mg (has no administration in time range)     And   diphenhydrAMINE (BENADRYL) injection 50 mg (has no administration in  time range)   haloperidol (HALDOL) tablet 5 mg (5 mg Oral $Given 11/10/23 1310)     And   diphenhydrAMINE (BENADRYL) capsule 50 mg (50 mg Oral $Given 11/10/23 1310)   nicotine (COMMIT) lozenge 2 mg (2 mg Buccal $Given 11/11/23 1210)   hydrOXYzine (ATARAX) tablet 25 mg ( Oral See Alternative 11/11/23 0904)     Or   hydrOXYzine (ATARAX) tablet 50 mg (50 mg Oral $Given 11/11/23 0904)   nicotine polacrilex (NICORETTE) gum 4 mg (has no administration in time range)   OLANZapine zydis (zyPREXA) ODT tab 10 mg (10 mg Oral $Given 11/9/23 2252)   OLANZapine zydis (zyPREXA) ODT tab 10 mg (10 mg Oral $Given 11/10/23 1053)            There were no significant events during my shift.    Patient was signed out to the oncoming provider.      Impression:    ICD-10-CM    1. Schizoaffective disorder, bipolar type (H)  F25.0           Plan:    Awaiting inpatient mental health admission/transfer.      RESULTS:   No results found for this visit on 11/09/23 (from the past 24 hour(s)).    MD Katerina Evans Ashley, MD  11/11/23 6444

## 2023-11-11 NOTE — PROGRESS NOTES
Pt went back to sleep after getting Hydroxyzine 50 mg. A safety check is completed every 15 minutes. No respiratory distress was noted or observed. will continue to monitor PT.

## 2023-11-11 NOTE — TELEPHONE ENCOUNTER
R: [place]  Access Inpatient Bed Call Log 11/10/23 @4:40pm   Intake can METRO + 2 for placement..                [facility] is at capacity.???????????             [facility] is posting 0 beds. [phone].?             [facility] is posting 2 beds. Negative covid required.????[phone] ??????????             [facility] is posting 0?beds. Negative covid required. [phone]              [facility] is posting 1 beds.?[phone]             [facility] is posting 0 beds. [phone].              [facility] is posting 1?beds for Young Adults age 18-26. Call for details. Negative covid.? [phone].              [facility] is posting 1 beds??????[phone]              [facility] ([facility]) is posting 1 beds. [phone]                [facility] is posting 4 beds. LOW acuity ONLY. Mixed unit 12+. Negative?covid- [phone].??             [facility] has 1 bed posted. No aggression. Negative Covid. Low acuity. ??[phone]             [facility] is posting 0 beds. Negative covid. [phone].              [facility]) is posting 1 beds. Low acuity only. Negative covid. ?[phone].?             [facility] is posting 2 beds. Low acuity. No current aggression.?[phone]             [facility] ([place]) has 0 beds available. Negative covid.? [phone].????             [facility] is posting 1?bed. Low acuity. 72 HH hold preferred. Negative covid required.?[phone].             [facility] ([place]) is posting 0 beds. Low acuity only. Negative covid. ?[phone].                  [facility] in [place] is posting 3?beds.? Negative covid required.?? Vol only, No history of aggression, violence, or assault. No sexual offenders. No 72  holds.?[phone].??        Patient remains on the work list pending appropriate bed availability.????

## 2023-11-11 NOTE — TELEPHONE ENCOUNTER
R: [place]  Access Inpatient Bed Call Log 11/11/23 @ 1:00am   Intake has called facilities that have not updated their bed status within the last 12 hours.??     *METRO:  [place] -- [facility]: @ cap per website.  [place] -- [facility]:  @ cap per website.  [place] -- [facility]: @ cap per website.  [place] -- [facility]:  @ cap per RN. Open for review for day shift, low acuity only.  [place] -- [facility]: @ cap per website.  [facility] -- [facility]: @ cap per website.  [facility] - POSTING 1 BED. Ages 18-25, Voluntary only, COVID test req'd, NO aggression, physical or sexual assault, violence hx or drug abuse  [place] -- [facility]: @ cap per website.  [place] -- [facility]: @ cap per website.  [place] -- [facility]: @ cap per website.    *STATEWIDE (by distance):  [facility] - POSTING 4 BEDS. Mixed unit/Low acuity only.   [facility] - @ cap per website. Low acuity, No aggression  [facility] -- @ cap per website.   [facility] -- @ cap per website. Low acuity only. No current aggression.  [facility] - POSTING 2 BEDS. COVID negative test req. Lower acuity only  [facility] - POSTING 5 BEDS. Low acuity only. Prefer med adjustments placement.  [place] [facility] -- @ cap per website.  No aggression  [place] -- [facility]: POSTING 1 BED. NO reviews after 10PM. Low acuity only.  [place] -- [facility]: POSTING 3 BEDS.  No hx of aggression. No sexual offenders. Voluntary patients only.  [place] -- [facility]: POSTING 7 BEDS. Low acuity only. Must have the cognitive ability to do programming. No aggressive or violent behavior or recent HX in the last 2 yrs. MH must be primary.   [place] [facility] @ cap per website.  COVID negative test. Must be low acuity ONLY.  [place] -- [facility]: @ cap per website. Low acuity. Negative Covid.     Pt remains on waitlist pending appropriate placement  availability

## 2023-11-12 ENCOUNTER — TELEPHONE (OUTPATIENT)
Dept: BEHAVIORAL HEALTH | Facility: CLINIC | Age: 38
End: 2023-11-12
Payer: COMMERCIAL

## 2023-11-12 PROCEDURE — 250N000013 HC RX MED GY IP 250 OP 250 PS 637: Performed by: EMERGENCY MEDICINE

## 2023-11-12 PROCEDURE — 250N000013 HC RX MED GY IP 250 OP 250 PS 637

## 2023-11-12 PROCEDURE — 250N000013 HC RX MED GY IP 250 OP 250 PS 637: Performed by: STUDENT IN AN ORGANIZED HEALTH CARE EDUCATION/TRAINING PROGRAM

## 2023-11-12 PROCEDURE — 99417 PROLNG OP E/M EACH 15 MIN: CPT

## 2023-11-12 PROCEDURE — 99245 OFF/OP CONSLTJ NEW/EST HI 55: CPT

## 2023-11-12 RX ORDER — OLANZAPINE 10 MG/1
10 TABLET, ORALLY DISINTEGRATING ORAL DAILY
Status: DISCONTINUED | OUTPATIENT
Start: 2023-11-13 | End: 2023-11-15 | Stop reason: HOSPADM

## 2023-11-12 RX ORDER — OLANZAPINE 5 MG/1
5 TABLET, ORALLY DISINTEGRATING ORAL 2 TIMES DAILY PRN
Status: DISCONTINUED | OUTPATIENT
Start: 2023-11-12 | End: 2023-11-15

## 2023-11-12 RX ADMIN — NICOTINE POLACRILEX 2 MG: 2 LOZENGE ORAL at 11:37

## 2023-11-12 RX ADMIN — HYDROXYZINE HYDROCHLORIDE 50 MG: 25 TABLET, FILM COATED ORAL at 16:07

## 2023-11-12 RX ADMIN — NICOTINE POLACRILEX 4 MG: 4 GUM, CHEWING BUCCAL at 04:50

## 2023-11-12 RX ADMIN — BUPRENORPHINE AND NALOXONE 1 FILM: 4; 1 FILM, SOLUBLE BUCCAL; SUBLINGUAL at 08:20

## 2023-11-12 RX ADMIN — NICOTINE POLACRILEX 2 MG: 2 LOZENGE ORAL at 08:40

## 2023-11-12 RX ADMIN — NICOTINE POLACRILEX 4 MG: 4 GUM, CHEWING BUCCAL at 16:07

## 2023-11-12 RX ADMIN — OLANZAPINE 5 MG: 5 TABLET, ORALLY DISINTEGRATING ORAL at 12:05

## 2023-11-12 RX ADMIN — MULTIPLE VITAMINS W/ MINERALS TAB 1 TABLET: TAB at 08:19

## 2023-11-12 RX ADMIN — OLANZAPINE 10 MG: 10 TABLET, ORALLY DISINTEGRATING ORAL at 21:18

## 2023-11-12 RX ADMIN — NICOTINE POLACRILEX 2 MG: 2 LOZENGE ORAL at 10:15

## 2023-11-12 RX ADMIN — BUPRENORPHINE AND NALOXONE 1 FILM: 8; 2 FILM, SOLUBLE BUCCAL; SUBLINGUAL at 08:20

## 2023-11-12 RX ADMIN — HYDROXYZINE HYDROCHLORIDE 25 MG: 25 TABLET ORAL at 04:49

## 2023-11-12 RX ADMIN — OLANZAPINE 5 MG: 5 TABLET, ORALLY DISINTEGRATING ORAL at 16:07

## 2023-11-12 RX ADMIN — IBUPROFEN 600 MG: 600 TABLET ORAL at 04:50

## 2023-11-12 RX ADMIN — OLANZAPINE 5 MG: 5 TABLET, ORALLY DISINTEGRATING ORAL at 08:19

## 2023-11-12 RX ADMIN — PRAZOSIN HYDROCHLORIDE 2 MG: 2 CAPSULE ORAL at 21:18

## 2023-11-12 RX ADMIN — GUANFACINE 1 MG: 1 TABLET, EXTENDED RELEASE ORAL at 21:18

## 2023-11-12 RX ADMIN — NICOTINE POLACRILEX 2 MG: 2 LOZENGE ORAL at 19:32

## 2023-11-12 RX ADMIN — NICOTINE POLACRILEX 2 MG: 2 LOZENGE ORAL at 14:09

## 2023-11-12 ASSESSMENT — ACTIVITIES OF DAILY LIVING (ADL)
ADLS_ACUITY_SCORE: 37

## 2023-11-12 NOTE — CONSULTS
"  Psychiatric Consult  follow-up  Consult date: November 12, 2023  Markus Mosley MRN# [id number]   Age: 37 year old YOB: 1985   Date of Admission to ED: 11/9/2023       In person visit details   Patient was assessed and interviewed face-to-face in person with this writer bari. Patient was observed to be able to participate in the assessment as evidenced by verbal consent. Assessment methods included conducting a formal interview with patient, review of medical records, collaboration with medical staff, and obtaining relevant collateral information from family and community providers when available.     Requesting Source   I am being consulted by the ED provider to offer additional guidance on psychiatric pharmacological interventions. This note is being entered to supplement the psychiatry consultation note that was completed on November 9, 2023 by the licensed mental health professional Donna Gambino . They have reviewed with me the pertinent clinical details related to their encounter.       Interim History   Markus Mosley is a 37 year old male with a history notable for  ADHD, PTSD, schizoaffective disorder, ELEN, polysubstance abuse  (alcohol, cannabis, benzodiazepines, opiates)  and who presented to the ED 11/9/2023 increasing anxiety, sleep disturbance, increasing stress.  Patient has been deemed medically cleared for psychiatric evaluation by primary ED team and is currently on ED status; legal status is voluntary     On approach today patient is observed in the general milieu and interacting appropriately with staff and peers.  Requested to meet in common area. Overall feeling better, \"I don't feel that crawling out of my skin feeling anymore.\" He feels olanzapine has been helpful for anxiety, paranoia, and the voices. Denies VH, denies current auditory hallucinations but endorses non-command AH intermittently and paranoia at baseline. He endorsed thoughts of suicide earlier " this morning but utilized coping skills with good effect. Denies current suicidal, homicidal or violent ideation.   He had some bad dreams a few night ago and endorses anticipatory anxiety around sleep; He notes he has a medical marijuana card and uses this at home for sleep. Feels prazosin is helpful. We discussed using PRN hydroxyzine at bedtime.   He seeks reassurance frequently and noted to be eager. Appears anxious, animated.     Severity is currently elevated.    The patient has required medications for agitation, and has not required restraints/seclusion for patient and/or provider safety.    Brief Therapeutic Intervention(s): Provided rapport building, active listening, unconditional positive regard, and validation.          Allergies     Allergies   Allergen Reactions    Penicillins Swelling        Medications   No current facility-administered medications on file prior to encounter.  guanFACINE (INTUNIV) 1 MG TB24 24 hr tablet, Take 1 tablet (1 mg) by mouth at bedtime for 30 days  lisdexamfetamine (VYVANSE) 60 MG capsule, Take 60 mg by mouth daily  losartan (COZAAR) 25 MG tablet, Take 25 mg by mouth daily  Melatonin 10 MG CAPS, Take 1 capsule by mouth at bedtime  multivitamin w/minerals (THERA-VIT-M) tablet, Take 1 tablet by mouth daily  prazosin (MINIPRESS) 2 MG capsule, Take 1 capsule (2 mg) by mouth at bedtime for 30 days  SUBOXONE 12-3 MG FILM per film, Place 1 Film under the tongue daily       buprenorphine HCl-naloxone HCl  1 Film Sublingual Daily    buprenorphine HCl-naloxone HCl  1 Film Sublingual Daily    guanFACINE  1 mg Oral At Bedtime    [Held by provider] losartan  25 mg Oral Daily    multivitamin w/minerals  1 tablet Oral Daily    OLANZapine zydis  10 mg Oral At Bedtime    OLANZapine zydis  5 mg Oral Daily    prazosin  2 mg Oral At Bedtime    propranolol  10 mg Oral BID          Laboratory/Studies   No results found for this or any previous visit (from the past 48 hour(s)).         Physical  "and Psychiatric Examination:   BP (!) 146/76   Pulse 54   Temp 97.7  F (36.5  C) (Oral)   Resp 17   Ht 1.727 m (5' 8\")   Wt 92.2 kg (203 lb 4.8 oz)   SpO2 97%   BMI 30.91 kg/m    Weight is 203 lbs 4.8 oz  Body mass index is 30.91 kg/m .    Gen: appears stated age, no apparent distress  Resp: Speaking in full sentences unlabored, no audible stridor or wheezing  Neuro: Moves all extremities without apparent weakness or difficulty, follows commands        Mental Status Exam:  Appearance: awake, alert, adequately groomed, appeared older than stated age, and casually dressed, greying hair, blue zip up jacket   Attitude:  cooperative, pleasant, seeking reassurance  Eye Contact:  fair:   Mood:  anxious   Affect:  mood congruent, intensity is exaggerated, labile, full range, and reactive   Speech:  clear, coherent and loud at times  Language: fluent and intact in English  Psychomotor, Gait, Musculoskeletal:  no evidence of tardive dyskinesia, dystonia, or tics and fidgeting :   Thought Process:  tangental loosening of associations present  Thought Content:  no visual hallucinations present, no violent or homicidal ideation, no current suicidal ideation, no current auditory hallucinations  Insight:  fair  Judgement:  fair  Oriented to:  time, person, and place   Attention Span and Concentration:  fair, no screenings or formal testing performed  Recent and Remote Memory: grossly intact based on history taking; no screenings or formal testing performed     Diagnosis   Schizoaffective disorder, bipolar type (H)  ADHD  PTSD  Opoid Use Disorder       Assessment and Recommendations   Markus Mosley is a 37 year old male with a history notable for  ADHD, PTSD, schizoaffective disorder, ELEN, polysubstance abuse  (alcohol, cannabis, benzodiazepines, opiates)  and who presented to the ED 11/9/2023 increasing anxiety, sleep disturbance, increasing stress.  Presentation is complicated by substance use.  Legal status is " voluntary. Patient has been benefiting from olanzapine with decrease in paranoia, hallucinations, anxiety. Recommend increasing scheduled olanzapine to 10 mg BID. Patient has not received propranolol in several days due to not meeting order parameters but is with resolving akathisia and recommendations are to discontinue. Recommend that patient utilize PRN hydroxyzine at bedtime for anticipatory anxiety around sleep. Should melatonin be used for sleep, recommend limiting dose to 2 mg (exceeding physiologic levels could worsen sleep fragmentation and nightmares). Patient has been recommended for inpatient psychiatric admission and pending bed availability. Should symptoms continue to stabilize pending admission, consider transitioning to a lower level of care for treatment.     Recommendations  Disposition: Inpatient psychiatric hospitalizations for further symptom stabilization and medication management   Legal:   Voluntary; should patient request discharge, recommend re-evaluation   Medication:   -increase Olanzapine  to 10 mg  BID  - continue guanfacine (Intuniv) 1 mg Q HS for ADHD   - continue prazosin 2 mg Q HS for PTSD  - continue Suboxone 12-3 mg Q D for opioid dependence  -discontinue Propranolol  PRNs  Nicorette gum 4 mg q hr PRN smoking cessation  Nicorette lozenge 2 mg q hr PRN smoking cessation  Vistaril 25- 50 mg q6 PRN pain/anxiety  Olanzapine 5 mg BID PRN agitation  5. On-going medical management per ED team.   6. Consult psychiatry as needed.     Discussed the case and recommendations with ED attending  patient's ED RN regarding this case. Thank you for letting me participate in the care of this patient.    Please call [facility]/DEC at [phone] if you have follow-up questions or wish to place another consult.    Time with: Patient: 20 minutes; Treatment Team: 25 Minutes; Chart Review: 30 minutes  Total time spent was 75 minutes. Over 50% of times was spent counseling and coordination of care.    Dhruv LANDIS  YVAN Payton CNP   [facility] EMERGENCY DEPARTMENT

## 2023-11-12 NOTE — ED NOTES
Patient slept through the shift and wake up around 0500.Patient used the bathroom and had some snacks.No behaviors noted.Will continue to monitor.

## 2023-11-12 NOTE — TELEPHONE ENCOUNTER
R: [place]  Access Inpatient Bed Call Log 11/12/23 @ 1:00am   Intake has called facilities that have not updated their bed status within the last 12 hours.??     *METRO:  [place] -- [facility]: @ cap per website.  [place] -- [facility]:  @ cap per website.  [place] -- [facility]: @ cap per website.  [place] -- [facility]:  @ cap per RN. Open for review for day shift, low acuity only.  [place] -- [facility]: @ cap per website.  [facility] -- [facility]: @ cap per website.  [facility] - POSTING 1 BED. Ages 18-25, Voluntary only, COVID test req'd, NO aggression, physical or sexual assault, violence hx or drug abuse  [place] -- [facility]: @ cap per website.  [place] -- [facility]: @ cap per website.  [place] -- [facility]: @ cap per website.    *METRO+2HRS (by distance):  [facility] - POSTING 4 BEDS. Mixed unit/Low acuity only.   [facility] - @ cap per website. Low acuity, No aggression  [facility] -- @ cap per website.   [facility] -- @ cap per website. Low acuity only. No current aggression.  [facility] - POSTING 2 BEDS. COVID negative test req. Lower acuity only  [facility] - POSTING 7 BEDS. Low acuity only. Prefer med adjustments placement.  [facility] - POSTING 3 BEDS.  No aggression  [place] -- [facility]: POSTING 1 BED. NO reviews after 10PM. Low acuity only.  [place] -- [facility]: POSTING 3 BEDS.  No hx of aggression. No sexual offenders. Voluntary patients only.    Pt remains on waitlist pending appropriate placement availability

## 2023-11-12 NOTE — PROGRESS NOTES
PT has been awake most of the shift. He appears pleasant and cooperative with staff and care. PT is medication-compliant. Pt denied SI/HI but endorses auditory hallucinations. PT states that the voices are diminished. Watching movies with peers. Pt ate dinner 100%. PT appears manic and restless, but he had a good shift. He asked for a nicotine lozenge every hour.  will continue to monitor the PT.

## 2023-11-12 NOTE — TELEPHONE ENCOUNTER
8:33 AM Per call to Pat at [facility] can review for admission will cb.  9:15 AM Fax Sent to [facility] for review.    R: [place] MH Access Inpatient Bed Call Log 11/12/23 7:30 AM   Intake has called facilities that have not updated the bed status within the last 12 hours.     (Metro+2 hrs)               [facility] is at capacity.              [facility] is posting 0 beds. [phone]. 11/12 Per call at 7:43am to [facility] currently at capacity.   [facility] is posting 0 beds. Negative covid required.                 [facility] is posting 0 bed. Negative covid required. No high school antwan-psych.[phone] 11/12 Per call at 7:44am to [facility] will review low acuity only.   [facility] is posting 0 beds. [phone]   [facility] is posting 0 beds. [phone].    [facility] is posting 1 bed. Call for details. Negative covid.  [phone]. 11/12 Per call at 7:46am to [place] 1 YA bed available. 11/12 Pt not appropriate d/t facility restrictions.  [facility] is posting 0 beds           [facility] is posting 0 beds.     [facility] is posting 4 beds. LOW acuity ONLY. Mixed unit 12+. Negative covid- [phone].     [facility] has 0 beds posted. No aggression. Negative Covid. Low acuity.      [facility] is posting 0 beds. Negative covid. [phone] 11/12 Per call at 7:49am to [place] no beds for admission.   [facility] is posting 2 beds. Low acuity only. Negative covid.  [phone].    [facility] is posting 0 beds. Low acuity. No current aggression. [phone]   [facility] ([place] is posting 7 beds available. Negative covid.  [phone].       [facility] is posting 1 bed. Low acuity. 72 HH hold preferred. Negative covid required. [phone]. 11/12 Per call at 7:51am to [place] possibly however not until later this afternoon.   [facility] is posting 3 beds. Low acuity only. Negative covid.   [phone]. 11/12 Per call at 7:52am to [place] beds are available.     [facility] [place] is posting 3 beds.  Negative covid required.   Vol only, No history of aggression, violence, or assault. No sexual offenders. No 72 HH holds. [phone].     [facility] is posting 7 beds. Negative covid required.  (Must have the cognitive ability to do programming. No aggressive or violent behavior or recent HX in the last 2 yrs. MH must be primary.) Always low acuity. [phone]    [facility] has 0 beds posted. Negative covid required.  Low acuity only. Violence and aggression capped.  [phone]. Low acuity only. 11/12 Per call at 7:54am to [place] no beds.   [facility] is posting 0 beds. Low acuity, Negative covid required. [phone]. 11/12 Per call at 7:56am to Anast[place] no beds however can add to referral list.     Patient remains on the work list pending appropriate bed availability.
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
## 2023-11-12 NOTE — CONSULTS
SPIRITUAL HEALTH SERVICES - Consult Note [facility] ([facility]) BEC     Referral Source/Reason for Visit: patient request for rosary      Summary and Recommendations - Per unit guidelines rosary is not available.  Dropped off a rosary card for patient.      Plan: no plan forward at this time          Gail Dunham   Chaplain Resident  Pager [phone]    [facility] available 24/7 for emergent requests/referrals, either by paging the on-call  or by entering an ASAP/STAT consult in [facility], which will also page the on-call .         was on the unit floor seeing another patient when Markus asked if he could have a rosary.  Staff put in a consult order and  delivered rosary card to the floor per request.    * [facility] remains available 24/7 for emergent requests/referrals, either by having the switchboard page the on-call  or by entering an ASAP/STAT consult in Epic (this will also page the on-call ). Routine Epic consults receive an initial respon

## 2023-11-12 NOTE — PROGRESS NOTES
"Triage & Transition Services, Extended Care     Therapy Progress Note & Reassessment    Patient: Markus goes by \"Markus,\" uses he/him pronouns  Date of Service: November 12, 2023  Site of Service: [facility]  Patient was seen in-person.     Presenting problem:   Markus is followed related to Long wait time for admission:   . Please see initial [facility] Crisis Assessment completed by Donna Gambino on 11/9/23 for complete assessment information. Notable concerns include Anxiety, Worsening psychosocial stress, Health stressors .     Individuals Present: Markus & Nael Rice    Session start: 2:26pm  Session end: 3:16pm  Session duration in minutes: 50  Session number: 1  Anticipated number of sessions or this episode of care: 1  CPT utilized: 84814 - Psychotherapy (with patient) - 45 (38-52*) min    Current Presentation:   Pt presents as manic with poor concentration and tangential speech. Pt seems more regulated today but throughout meeting is observably manic with restlessness and pressured speech. Pt exhibits disorganized thinking with challenges in remembering conversation specifics. Pt reported no current SI, SIB or HI. Pt reported agitation earlier but the Zyprexa is working in managing that symptom. Pt reports using his coping skills including communication, pacing, and square breathing for self-regulation. Pt shared using Insight Timer kit on BEC Ipad for guided meditation. This writer and pt discussed the role of self-compassion in supporting his emotional health and acceptance of current situation. Pt engaged in safety planning and reports eagerness for admission to inpatient mental health unit.     Mental Status Exam:   Appearance: awake, alert  Attitude: cooperative  Eye Contact: good  Mood:  elevated  Affect: mood congruent and intensity is exaggerated  Speech: pressured speech and rambling  Psychomotor Behavior: fidgeting  Thought Process:  disorganized and tangental  Associations: loosening of " associations present  Thought Content: no evidence of suicidal ideation or homicidal ideation  Insight: good  Judgement: intact  Oriented to: time, person, and place  Attention Span and Concentration: intact  Recent and Remote Memory: intact    Diagnosis:   Schizoaffective disorder, bipolar type (H) F25.0       Therapeutic Intervention(s):   Provided active listening, unconditional positive regard, and validation. Engaged in safety planning.  Explored strategies for self-soothing.     Treatment Objective(s) Addressed:   The focus of this session was on safety planning and assessing safety.     Progress Towards Goals:   Patient reports improving symptoms with less reported confusion. Patient is making progress towards treatment goals as evidenced by engagement in safety planning.     Case Management:  -Printed out information about self-compassion and self-compassion focused mindfulness exercises.     General Recommendations:   Continue to monitor for harm. Consider: Use a positive, direct and calm approach. Pt's tend to match the energy/mood of the staff. Keep focus positive and upbeat    Plan:   Inpatient Mental Health: no change to plan; continued recommendation for inpatient mental health due to acuity of mental health symptoms including magali and psychosis. Pt would benefit from therapeutic setting of inpatient for continued stabilization. Pt wants inpatient admission and believes it would help him get well.    Plan for Care reviewed with Assigned Medical Provider? Yes. Provider. Dr. Calderon. Response: agreed     Nael Rice   Licensed Mental Health Professional (LMHP), [facility]  [phone]

## 2023-11-12 NOTE — ED NOTES
"Pt reports they did not sleep well last night. Reports he had nightmares. He is up and moving around. Presents manic.  Hyper verbal , tangential, pacing, Took his meds. Reports positive effect from Zyprexa \" My mind is more clear when I take this medication\". Pt remains behaviorally controlled.   "

## 2023-11-12 NOTE — ED NOTES
Pt requested/administered Zyprexa for agitation. Reports he is starting to feel agitated. Pt continues to remain behaviorally controlled.

## 2023-11-13 ENCOUNTER — TELEPHONE (OUTPATIENT)
Dept: BEHAVIORAL HEALTH | Facility: CLINIC | Age: 38
End: 2023-11-13
Payer: COMMERCIAL

## 2023-11-13 PROCEDURE — 250N000013 HC RX MED GY IP 250 OP 250 PS 637

## 2023-11-13 PROCEDURE — 250N000013 HC RX MED GY IP 250 OP 250 PS 637: Performed by: EMERGENCY MEDICINE

## 2023-11-13 PROCEDURE — 250N000013 HC RX MED GY IP 250 OP 250 PS 637: Performed by: STUDENT IN AN ORGANIZED HEALTH CARE EDUCATION/TRAINING PROGRAM

## 2023-11-13 RX ADMIN — PRAZOSIN HYDROCHLORIDE 2 MG: 2 CAPSULE ORAL at 21:19

## 2023-11-13 RX ADMIN — OLANZAPINE 5 MG: 5 TABLET, ORALLY DISINTEGRATING ORAL at 13:38

## 2023-11-13 RX ADMIN — NICOTINE POLACRILEX 4 MG: 4 GUM, CHEWING BUCCAL at 16:00

## 2023-11-13 RX ADMIN — NICOTINE POLACRILEX 2 MG: 2 LOZENGE ORAL at 10:57

## 2023-11-13 RX ADMIN — OLANZAPINE 10 MG: 10 TABLET, ORALLY DISINTEGRATING ORAL at 08:21

## 2023-11-13 RX ADMIN — HYDROXYZINE HYDROCHLORIDE 25 MG: 25 TABLET ORAL at 17:42

## 2023-11-13 RX ADMIN — NICOTINE POLACRILEX 2 MG: 2 LOZENGE ORAL at 13:38

## 2023-11-13 RX ADMIN — BUPRENORPHINE AND NALOXONE 1 FILM: 4; 1 FILM, SOLUBLE BUCCAL; SUBLINGUAL at 08:21

## 2023-11-13 RX ADMIN — NICOTINE POLACRILEX 2 MG: 2 LOZENGE ORAL at 12:42

## 2023-11-13 RX ADMIN — NICOTINE POLACRILEX 4 MG: 4 GUM, CHEWING BUCCAL at 17:40

## 2023-11-13 RX ADMIN — BUPRENORPHINE AND NALOXONE 1 FILM: 8; 2 FILM, SOLUBLE BUCCAL; SUBLINGUAL at 08:21

## 2023-11-13 RX ADMIN — GUANFACINE 1 MG: 1 TABLET, EXTENDED RELEASE ORAL at 21:19

## 2023-11-13 RX ADMIN — MULTIPLE VITAMINS W/ MINERALS TAB 1 TABLET: TAB at 10:57

## 2023-11-13 RX ADMIN — OLANZAPINE 10 MG: 10 TABLET, ORALLY DISINTEGRATING ORAL at 21:18

## 2023-11-13 RX ADMIN — NICOTINE POLACRILEX 2 MG: 2 LOZENGE ORAL at 08:21

## 2023-11-13 RX ADMIN — DIPHENHYDRAMINE HYDROCHLORIDE 50 MG: 25 CAPSULE ORAL at 03:37

## 2023-11-13 RX ADMIN — IBUPROFEN 600 MG: 600 TABLET ORAL at 03:37

## 2023-11-13 ASSESSMENT — ACTIVITIES OF DAILY LIVING (ADL)
ADLS_ACUITY_SCORE: 37

## 2023-11-13 NOTE — PROGRESS NOTES
Triage & Transition Services, Extended Care    Client Name: Markus Mosley    Date: November 13, 2023  Service Type:  Group Therapy  Session Start Time:  1115    Session End Time: 1134  Session Length: 21  Site Location: [facility]  Attendees: Patient and other group members  Facilitator: Fabien BARTON Coord.     Topic:   20579 Grounding and Square Breathing     Intervention:    Group process: support, challenge, affirm, psycho-education.     Response:  Patient did participate in group. Behavior in group was Appropriate. Patient shared he has felt down this morning and has been using other coping skills. Patient verbalized that skills learned in group would be helpful to manage his anxiety and he would like to try them outside of group. Patient shared a lot in group, sometimes off topic.       Arnulfo Peña on 11/13/2023 at 12:13 PM

## 2023-11-13 NOTE — PROGRESS NOTES
PT had a good shift. PT has been resting in bed most of the shift. The patient asked for Valium, and the provider was notified and prescribed Olanzapine 5 mg twice a day instead. Pt received 5 mg of olanzapine. The patient denied SI, HI, and VA hallucinations. Pt ate all meals. PT appears pleasant and cooperative, but withdrawn. The patient would like his wound care done in the evening. will continue to monitor PT.

## 2023-11-13 NOTE — ED PROVIDER NOTES
[facility] ED Mental Health Handoff Note:       Brief HPI:  This is a 37 year old male signed out to me.  See initial ED Provider note for full details of the presentation. Interval history is pertinent for ongoing ED boarding. No acute concerns today.    Home meds reviewed and ordered/administered: Yes    Medically stable for inpatient mental health admission: Yes.    Evaluated by mental health: Yes. The recommendation is for inpatient mental health treatment. Bed search in process    Safety concerns: At the time I received sign out, there were no safety concerns.    Hold Status:  Active Orders   N/A         Exam:   Patient Vitals for the past 24 hrs:   BP Temp Temp src Pulse Resp SpO2   11/13/23 0901 113/70 98.1  F (36.7  C) Oral 57 18 97 %   11/12/23 1935 129/76 98.4  F (36.9  C) Oral 101 18 97 %         ED Course:    Medications   ibuprofen (ADVIL/MOTRIN) tablet 600 mg (600 mg Oral $Given 11/13/23 0337)   guanFACINE (INTUNIV) 24 hr tablet 1 mg (1 mg Oral $Given 11/12/23 2118)   losartan (COZAAR) tablet 25 mg ( Oral Automatically Held 11/16/23 0800)   multivitamin w/minerals (THERA-VIT-M) tablet 1 tablet (1 tablet Oral $Given 11/13/23 1057)   prazosin (MINIPRESS) capsule 2 mg (2 mg Oral $Given 11/12/23 2118)   buprenorphine HCl-naloxone HCl (SUBOXONE) 4-1 MG per film 1 Film (1 Film Sublingual $Given 11/13/23 0821)   buprenorphine HCl-naloxone HCl (SUBOXONE) 8-2 MG per film 1 Film (1 Film Sublingual $Given 11/13/23 0821)   OLANZapine zydis (zyPREXA) ODT tab 10 mg (10 mg Oral $Given 11/12/23 2118)   haloperidol lactate (HALDOL) injection 5 mg (has no administration in time range)     And   diphenhydrAMINE (BENADRYL) injection 50 mg (has no administration in time range)   haloperidol (HALDOL) tablet 5 mg (5 mg Oral $Given 11/10/23 1310)     And   diphenhydrAMINE (BENADRYL) capsule 50 mg (50 mg Oral $Given 11/13/23 0337)   nicotine (COMMIT) lozenge 2 mg (2 mg Buccal $Given 11/13/23 2936)   hydrOXYzine (ATARAX)  tablet 25 mg ( Oral See Alternative 11/12/23 1607)     Or   hydrOXYzine (ATARAX) tablet 50 mg (50 mg Oral $Given 11/12/23 1607)   nicotine polacrilex (NICORETTE) gum 4 mg (4 mg Buccal $Given 11/12/23 1607)   OLANZapine zydis (zyPREXA) ODT tab 10 mg (10 mg Oral $Given 11/13/23 0821)   OLANZapine zydis (zyPREXA) ODT tab 5 mg (has no administration in time range)   OLANZapine zydis (zyPREXA) ODT tab 10 mg (10 mg Oral $Given 11/9/23 2252)   OLANZapine zydis (zyPREXA) ODT tab 10 mg (10 mg Oral $Given 11/10/23 1053)            There were no significant events during my shift.      Impression:    ICD-10-CM    1. Schizoaffective disorder, bipolar type (H)  F25.0           Plan:    Awaiting mental health evaluation/recommendations.      RESULTS:   Results for orders placed or performed during the hospital encounter of 11/09/23 (from the past 24 hour(s))   Spiritual Health Services IP Consult     Status: None ()    Collection Time: 11/12/23  2:59 PM    Gail Lofton     11/12/2023  3:20 PM  SPIRITUAL HEALTH SERVICES - Consult Note [facility] ([facility]     Referral Source/Reason for Visit: patient request for rosary      Summary and Recommendations - Per unit guidelines rosary is not   available.  Dropped off a rosary card for patient.      Plan: no plan forward at this time          Gail Dunham   Chaplain Resident  Pager [phone]    [facility] available 24/7 for emergent requests/referrals, either by   paging the on-call  or by entering an ASAP/STAT consult   in [employer], which will also page the on-call .         was on the unit floor seeing another patient when Markus   asked if he could have a rosary.  Staff put in a consult order   and  delivered rosary card to the floor per request.    * [facility] remains available 24/7 for emergent requests/referrals,   either by having the switchboard page the on-call  or by   entering an ASAP/STAT consult in [employer] (this will also page the    on-call ). Routine Epic consults receive an initial   respon                MD Rico Mendez Dung Hoang, MD  11/13/23 7751

## 2023-11-13 NOTE — ED NOTES
The patient is pleasant and cooperative with the care and staff. PT is medication-compliant. He contracted for safety. Denied pain and V/A hallucinations. PT appears energetic in the milieu, talking to peers. Pt prefers to stay in the big milieu rather than the extended milieu, which, he states, makes him more depressed. PT asked for lozenges every hour. Will continue to monitor the PT.

## 2023-11-13 NOTE — TELEPHONE ENCOUNTER
[place]: [place]  Access Inpatient Bed Call Log 11/13/23 8:09 AM    Intake has called facilities that have not updated the bed status within the last 12 hours.                               [facility] is at capacity.              [facility] is posting 0 beds. [phone]. Per call at 8:13 am to [facility] no beds currently available.   [facility] is posting 0 beds. Negative covid required.                 [facility] is posting 0 bed. Negative covid required. No high school antwan-psych.[phone] Per call at 8:14am to Kourtney low acuity filling from ED to call to evaluate.   [facility] is posting 0 beds. [phone]   [facility] is posting 0 beds. [phone].    [facility] is posting 2 bed for Young Adults age 18-26. Call for details. Negative covid.  [phone]. Per call at 8:15am to Wilmer currently a couple YA beds, one single occupancy adolescent to review and no jacki or child.   [facility] is posting 0 beds           [facility] ([facility]) is posting 0 beds.       [facility] is posting 4 beds. LOW acuity ONLY. Mixed unit 12+. Negative covid- [phone].   Pt is too acute  [facility] has 0 beds posted. No aggression. Negative Covid. Low acuity.      [facility] is posting 0 beds. Negative covid. [phone]    [facility] ([place]) is posting 1 beds. Low acuity only. Negative covid.  [phone].  [place] already decline d/t acuity  [facility] is posting 0 beds. Low acuity. No current aggression. [phone]   [facility] ([place]) is posting 3 beds available. Negative covid.  [phone].   1 Med Psych and 3 mood disorder only.  [place] already decline d/t acuity  [facility] is posting 1 bed. Low acuity. 72 HH hold preferred. Negative covid required. [phone]. Per call at 8:17am to Nita currently no beds.  Pt already declined d/t acuity  [facility] ([place]) is posting 4 beds. Low acuity only. Negative covid.   [phone]. Low acuity only. [place] already decline d/t acuity     [facility] [place] is posting 4 beds.  Negative covid required.   Vol only, No history of aggression, violence, or assault. No sexual offenders. No 72 HH holds. [phone].     [facility] is posting 7 beds. Negative covid required.  (Must have the cognitive ability to do programming. No aggressive or violent behavior or recent HX in the last 2 yrs. MH must be primary.) Always low acuity. [phone]    [facility] has 0 beds posted. Negative covid required.  Low acuity only. Violence and aggression capped.  [phone]. Low acuity only.   [facility] is posting 0 beds. Low acuity, Negative covid required. [phone]. Per call at 8:18am to Pedro currently at capacity however can place on MH waitlist.       Pt remains on the work list pending appropriate bed availability.
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
## 2023-11-13 NOTE — ED NOTES
Patient cooperative, denies SI/HI/SIB, makes needs well known to staff. Excellent appetite. Patient spent time pacing, watching movies, video games, and interacting with peers in the milieu. Patient requests as needed medications are effective.

## 2023-11-13 NOTE — ED NOTES
Patient slept most often during NOC shift, although occasionally awake for requests. Cooperative. PRN medications effective.

## 2023-11-14 ENCOUNTER — TELEPHONE (OUTPATIENT)
Dept: BEHAVIORAL HEALTH | Facility: CLINIC | Age: 38
End: 2023-11-14
Payer: COMMERCIAL

## 2023-11-14 PROCEDURE — 250N000013 HC RX MED GY IP 250 OP 250 PS 637: Performed by: STUDENT IN AN ORGANIZED HEALTH CARE EDUCATION/TRAINING PROGRAM

## 2023-11-14 PROCEDURE — 99233 SBSQ HOSP IP/OBS HIGH 50: CPT

## 2023-11-14 PROCEDURE — 250N000013 HC RX MED GY IP 250 OP 250 PS 637

## 2023-11-14 PROCEDURE — 250N000013 HC RX MED GY IP 250 OP 250 PS 637: Performed by: EMERGENCY MEDICINE

## 2023-11-14 RX ORDER — LITHIUM CARBONATE 300 MG/1
300 TABLET, FILM COATED, EXTENDED RELEASE ORAL 2 TIMES DAILY
Status: DISCONTINUED | OUTPATIENT
Start: 2023-11-14 | End: 2023-11-15 | Stop reason: HOSPADM

## 2023-11-14 RX ORDER — LITHIUM CARBONATE 300 MG/1
300 TABLET, FILM COATED, EXTENDED RELEASE ORAL DAILY
Status: DISCONTINUED | OUTPATIENT
Start: 2023-11-14 | End: 2023-11-14

## 2023-11-14 RX ORDER — LITHIUM CARBONATE 450 MG
450 TABLET, EXTENDED RELEASE ORAL EVERY 12 HOURS SCHEDULED
Status: DISCONTINUED | OUTPATIENT
Start: 2023-11-14 | End: 2023-11-14

## 2023-11-14 RX ORDER — LITHIUM CARBONATE 300 MG/1
300 TABLET, FILM COATED, EXTENDED RELEASE ORAL EVERY 12 HOURS SCHEDULED
Status: DISCONTINUED | OUTPATIENT
Start: 2023-11-14 | End: 2023-11-14

## 2023-11-14 RX ORDER — LITHIUM CARBONATE 300 MG/1
600 TABLET, FILM COATED, EXTENDED RELEASE ORAL EVERY 12 HOURS SCHEDULED
Status: DISCONTINUED | OUTPATIENT
Start: 2023-11-14 | End: 2023-11-14

## 2023-11-14 RX ORDER — POLYETHYLENE GLYCOL 3350 17 G
4 POWDER IN PACKET (EA) ORAL
Status: DISCONTINUED | OUTPATIENT
Start: 2023-11-14 | End: 2023-11-15 | Stop reason: HOSPADM

## 2023-11-14 RX ORDER — ACETAMINOPHEN 325 MG/1
975 TABLET ORAL EVERY 8 HOURS PRN
Status: DISCONTINUED | OUTPATIENT
Start: 2023-11-14 | End: 2023-11-15 | Stop reason: HOSPADM

## 2023-11-14 RX ADMIN — ACETAMINOPHEN 325MG 975 MG: 325 TABLET ORAL at 11:11

## 2023-11-14 RX ADMIN — IBUPROFEN 600 MG: 600 TABLET ORAL at 05:18

## 2023-11-14 RX ADMIN — BUPRENORPHINE AND NALOXONE 1 FILM: 8; 2 FILM, SOLUBLE BUCCAL; SUBLINGUAL at 07:41

## 2023-11-14 RX ADMIN — ACETAMINOPHEN 325MG 975 MG: 325 TABLET ORAL at 21:06

## 2023-11-14 RX ADMIN — NICOTINE POLACRILEX 4 MG: 4 GUM, CHEWING BUCCAL at 05:20

## 2023-11-14 RX ADMIN — GUANFACINE 1 MG: 1 TABLET, EXTENDED RELEASE ORAL at 21:08

## 2023-11-14 RX ADMIN — PRAZOSIN HYDROCHLORIDE 2 MG: 2 CAPSULE ORAL at 21:08

## 2023-11-14 RX ADMIN — OLANZAPINE 5 MG: 5 TABLET, ORALLY DISINTEGRATING ORAL at 13:13

## 2023-11-14 RX ADMIN — HYDROXYZINE HYDROCHLORIDE 25 MG: 25 TABLET ORAL at 13:00

## 2023-11-14 RX ADMIN — NICOTINE POLACRILEX 4 MG: 4 GUM, CHEWING BUCCAL at 07:42

## 2023-11-14 RX ADMIN — OLANZAPINE 10 MG: 10 TABLET, ORALLY DISINTEGRATING ORAL at 21:07

## 2023-11-14 RX ADMIN — OLANZAPINE 10 MG: 10 TABLET, ORALLY DISINTEGRATING ORAL at 07:41

## 2023-11-14 RX ADMIN — LITHIUM CARBONATE 300 MG: 300 TABLET, EXTENDED RELEASE ORAL at 11:12

## 2023-11-14 RX ADMIN — NICOTINE POLACRILEX 4 MG: 2 LOZENGE ORAL at 20:12

## 2023-11-14 RX ADMIN — MULTIPLE VITAMINS W/ MINERALS TAB 1 TABLET: TAB at 07:41

## 2023-11-14 RX ADMIN — NICOTINE POLACRILEX 4 MG: 2 LOZENGE ORAL at 21:10

## 2023-11-14 RX ADMIN — NICOTINE POLACRILEX 2 MG: 2 LOZENGE ORAL at 09:14

## 2023-11-14 RX ADMIN — LITHIUM CARBONATE 300 MG: 300 TABLET, EXTENDED RELEASE ORAL at 20:12

## 2023-11-14 RX ADMIN — BUPRENORPHINE AND NALOXONE 1 FILM: 4; 1 FILM, SOLUBLE BUCCAL; SUBLINGUAL at 07:41

## 2023-11-14 RX ADMIN — NICOTINE POLACRILEX 2 MG: 2 LOZENGE ORAL at 13:52

## 2023-11-14 RX ADMIN — NICOTINE POLACRILEX 2 MG: 2 LOZENGE ORAL at 11:11

## 2023-11-14 ASSESSMENT — ACTIVITIES OF DAILY LIVING (ADL)
ADLS_ACUITY_SCORE: 37
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
## 2023-11-14 NOTE — ED NOTES
Patient has spent time on both milieus. He did report anxiety this afternoon and requested Prn's which he reports were helpful.

## 2023-11-14 NOTE — PROGRESS NOTES
Triage & Transition Services, Extended Care    Client Name: Markus Mosley    Date: November 14, 2023  Service Type:  Group Therapy  Session Start Time:  1132 am    Session End Time: 1200 pm  Session Length: 28 minutes  Site Location: [facility]  Attendees: Patient and other group members  Facilitator: Naomi Davenport     Topic:   Emotions regulating activity    Intervention:    Group process: support, challenge, affirm, psycho-education.     Response:  Patient did participate in group.Patient engaged and participated in activity.       Naomi Davenport

## 2023-11-14 NOTE — ED NOTES
Patient is calm and cooperative and is watching TV. He denies feelings of SI,  HI and no hallucinations at this time.

## 2023-11-14 NOTE — ED NOTES
Patient was on the other milieu until shortly before dinner then he came back to the extended unit. He is calm and cooperative. Denies feelings of SI,HI and no hallucinations. He is social with peers. He was medication compliant.

## 2023-11-14 NOTE — ED NOTES
Patient is pleasant and cooperative. Requested to go to the other milieu so he has room to pace the unit. He was medication compliant. Denies any feelings of SI, HI and no hallucinations. Charge nurse aware of patient on the milieu and will monitor.

## 2023-11-14 NOTE — ED NOTES
"0007  Patient chart reviewed.  No new orders or labs requested.   Patient currently resting with eyes closed.  No signs of distress or labored breathing.  Chest rise visualized.    0618  Patient awoke about 0500.  Patient has been calm, friendly, logical and relevant conversations.  Pacing around unit \"just burning energy, no negative vibes\"  Friendly with staff and other patients.    "
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
## 2023-11-14 NOTE — PROGRESS NOTES
"Triage & Transition Services, Extended Care     Patient: Markus Mosley , goes by \"Markus\"  Date: November 13, 2023  Service Type: Group  Session Start Time: 5:45 pm  Session End Time: 6:12 pm  Session Length: 27 min  Site Location: [facility] BEC  Attendees: Patient and other group members  Facilitator: Ila Do     Topic:    ACCEPTS DBT Skill through Contributing     Intervention:    Writer encouraged patients to introduce themselves. Writer used psycho education to teach the difference between this skill and distraction without intention. Writer provided direct prompting to patient to stop interrupting his peers. Writer reinforced patient's identified skill use, and challenged him to use intention when using these skills. Patient identified difficulties with distressing feelings. Writer reinforced distress tolerance, versus avoidance, but was unable to continue this discussion at length with patient due to the groups needs, the time limit, and the skill focus of group .     Response:  Patient did participate in group. Behavior in group was hyperverbal and required redirection to stop interrupting his peers. Patient shared ways he uses the skill. Patient identified difficulties with distressing feelings.     Ila Do  "

## 2023-11-14 NOTE — PROGRESS NOTES
"Triage & Transition Services, Extended Care     Patient: Markus Mosley , goes by \"Markus\"  Date: November 13, 2023  Service Type: Group  Session Start Time: 3:10 pm  Session End Time: 3:20 pm  Session Length: 10 min  Site Location: [facility] ED BEC  Attendees: Patient and other group members and  in training  Facilitator: Ila Do     Topic:    Paint by Sticker    Intervention:    Group process: support, challenge, affirm, psycho-education.    Response:  Patient arrived to group late. Patient participated in paint by sticker. Patient was hyperverbal.     Ila Do  "
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
## 2023-11-15 ENCOUNTER — TELEPHONE (OUTPATIENT)
Dept: BEHAVIORAL HEALTH | Facility: CLINIC | Age: 38
End: 2023-11-15
Payer: COMMERCIAL

## 2023-11-15 VITALS
HEIGHT: 68 IN | SYSTOLIC BLOOD PRESSURE: 119 MMHG | OXYGEN SATURATION: 98 % | RESPIRATION RATE: 18 BRPM | HEART RATE: 71 BPM | DIASTOLIC BLOOD PRESSURE: 71 MMHG | BODY MASS INDEX: 30.81 KG/M2 | WEIGHT: 203.3 LBS | TEMPERATURE: 98 F

## 2023-11-15 LAB
BASOPHILS # BLD AUTO: 0.1 10E3/UL (ref 0–0.2)
BASOPHILS NFR BLD AUTO: 1 %
EOSINOPHIL # BLD AUTO: 0.3 10E3/UL (ref 0–0.7)
EOSINOPHIL NFR BLD AUTO: 4 %
ERYTHROCYTE [DISTWIDTH] IN BLOOD BY AUTOMATED COUNT: 12.6 % (ref 10–15)
HCT VFR BLD AUTO: 44.8 % (ref 40–53)
HGB BLD-MCNC: 14.4 G/DL (ref 13.3–17.7)
IMM GRANULOCYTES # BLD: 0 10E3/UL
IMM GRANULOCYTES NFR BLD: 1 %
LYMPHOCYTES # BLD AUTO: 2.5 10E3/UL (ref 0.8–5.3)
LYMPHOCYTES NFR BLD AUTO: 34 %
MCH RBC QN AUTO: 28.9 PG (ref 26.5–33)
MCHC RBC AUTO-ENTMCNC: 32.1 G/DL (ref 31.5–36.5)
MCV RBC AUTO: 90 FL (ref 78–100)
MONOCYTES # BLD AUTO: 0.8 10E3/UL (ref 0–1.3)
MONOCYTES NFR BLD AUTO: 11 %
NEUTROPHILS # BLD AUTO: 3.5 10E3/UL (ref 1.6–8.3)
NEUTROPHILS NFR BLD AUTO: 49 %
NRBC # BLD AUTO: 0 10E3/UL
NRBC BLD AUTO-RTO: 0 /100
PLATELET # BLD AUTO: 275 10E3/UL (ref 150–450)
RBC # BLD AUTO: 4.98 10E6/UL (ref 4.4–5.9)
SARS-COV-2 RNA RESP QL NAA+PROBE: NEGATIVE
WBC # BLD AUTO: 7.2 10E3/UL (ref 4–11)

## 2023-11-15 PROCEDURE — 250N000013 HC RX MED GY IP 250 OP 250 PS 637: Performed by: EMERGENCY MEDICINE

## 2023-11-15 PROCEDURE — 85025 COMPLETE CBC W/AUTO DIFF WBC: CPT

## 2023-11-15 PROCEDURE — 87635 SARS-COV-2 COVID-19 AMP PRB: CPT | Performed by: EMERGENCY MEDICINE

## 2023-11-15 PROCEDURE — 99233 SBSQ HOSP IP/OBS HIGH 50: CPT

## 2023-11-15 PROCEDURE — 250N000013 HC RX MED GY IP 250 OP 250 PS 637

## 2023-11-15 PROCEDURE — 36415 COLL VENOUS BLD VENIPUNCTURE: CPT

## 2023-11-15 RX ORDER — OLANZAPINE 5 MG/1
5 TABLET, ORALLY DISINTEGRATING ORAL DAILY PRN
Status: DISCONTINUED | OUTPATIENT
Start: 2023-11-15 | End: 2023-11-15 | Stop reason: HOSPADM

## 2023-11-15 RX ADMIN — NICOTINE POLACRILEX 4 MG: 2 LOZENGE ORAL at 17:49

## 2023-11-15 RX ADMIN — NICOTINE POLACRILEX 4 MG: 2 LOZENGE ORAL at 18:59

## 2023-11-15 RX ADMIN — MULTIPLE VITAMINS W/ MINERALS TAB 1 TABLET: TAB at 07:47

## 2023-11-15 RX ADMIN — OLANZAPINE 5 MG: 5 TABLET, ORALLY DISINTEGRATING ORAL at 13:04

## 2023-11-15 RX ADMIN — BUPRENORPHINE AND NALOXONE 1 FILM: 8; 2 FILM, SOLUBLE BUCCAL; SUBLINGUAL at 07:48

## 2023-11-15 RX ADMIN — OLANZAPINE 15 MG: 10 TABLET, ORALLY DISINTEGRATING ORAL at 18:58

## 2023-11-15 RX ADMIN — NICOTINE POLACRILEX 4 MG: 2 LOZENGE ORAL at 14:06

## 2023-11-15 RX ADMIN — LITHIUM CARBONATE 300 MG: 300 TABLET, EXTENDED RELEASE ORAL at 18:59

## 2023-11-15 RX ADMIN — OLANZAPINE 10 MG: 10 TABLET, ORALLY DISINTEGRATING ORAL at 07:47

## 2023-11-15 RX ADMIN — LITHIUM CARBONATE 300 MG: 300 TABLET, EXTENDED RELEASE ORAL at 07:47

## 2023-11-15 RX ADMIN — OLANZAPINE 5 MG: 5 TABLET, ORALLY DISINTEGRATING ORAL at 05:26

## 2023-11-15 RX ADMIN — HYDROXYZINE HYDROCHLORIDE 25 MG: 25 TABLET ORAL at 13:35

## 2023-11-15 RX ADMIN — ACETAMINOPHEN 325MG 975 MG: 325 TABLET ORAL at 07:46

## 2023-11-15 RX ADMIN — NICOTINE POLACRILEX 4 MG: 2 LOZENGE ORAL at 08:23

## 2023-11-15 RX ADMIN — NICOTINE POLACRILEX 4 MG: 2 LOZENGE ORAL at 06:24

## 2023-11-15 RX ADMIN — BUPRENORPHINE AND NALOXONE 1 FILM: 4; 1 FILM, SOLUBLE BUCCAL; SUBLINGUAL at 07:48

## 2023-11-15 ASSESSMENT — ACTIVITIES OF DAILY LIVING (ADL)
ADLS_ACUITY_SCORE: 37

## 2023-11-15 NOTE — PROGRESS NOTES
"Triage & Transition Services, Extended Care     Therapy Progress Note    Patient: Marksu goes by \"Markus,\" uses he/him pronouns  Date of Service: November 15, 2023  Site of Service: [facility] EMERGENCY DEPARTMENT                             [id number]    Mode of session: In-person      Presentation Summary: Writer met with pt in milieu in the BEC. Pt continues to present with hyper-verbal, pressured speech, and restlessness, alternating between sitting and standing during interaction.  Pt discusses a movie in which he draws a connection on the ways in which he feels triggered due to hx of trauma. Writer and pt discuss how trauma symptoms can be triggered and ways pt can use coping skills in that moment. Pt discusses experiencing feelings of frustration about waiting in the BEC for IP for nearly 1 week. Writer pt discussed the validity of pt's emotions and discussed various coping strategies. Pt reports feeling less irritable today than he was yesterday. Pt expresses wanting to talk with the ED psych provider about medications, reporting he would like medications scheduled rather than PRN, feels he needs an increase in Zyprexa, and expresses concern about not sleeping well at night. Pt reports he typically can only stay asleep for a few hours at a time before waking up.    Therapeutic Intervention(s) Provided: Coached on coping techniques/relaxation skills to help improve distress tolerance and managing intense emotions.    Current Symptoms: anxious irritable anxious, racing thoughts hyperverbal      Mental Status Exam   Affect: Dramatic  Appearance: Appropriate  Attention Span/Concentration: Attentive  Eye Contact: Variable    Fund of Knowledge: Appropriate   Language /Speech Content: Fluent  Language /Speech Volume: Normal  Language /Speech Rate/Productions: Hyperverbal, Pressured  Recent Memory: Intact  Remote Memory: Intact  Mood: Anxious  Orientation to Person: Yes   Orientation to Place: " Yes  Orientation to Time of Day: Yes  Orientation to Date: Yes     Situation (Do they understand why they are here?): Yes  Psychomotor Behavior: Hyperactive  Thought Content: Clear, Other (please comment) (racing thoughts)  Thought Form: Flight of Ideas    Treatment Objective(s) Addressed: rapport building, identifying and practicing coping strategies, processing feelings, assessing safety    Patient Response to Interventions: eager to participate    Progress Towards Goals: Patient Reports Symptoms Are: improving  Patient Progress Toward Goals: is making progress  Comment: Pt is making progress towards goals, however, continues to present with symptoms of magali: restlessness, difficulty sleeping, pressured speech.  Next Step to Work Toward Discharge: symptom stabilization  System Stabilization Comment: Continue to monitor symptoms of magali.    Case Management: Case Management Included: collaborating with patient's support system  Details on Collaborating with Patient's Support System: consulting with ED care team.    Plan: Final Disposition / Recommended Care Path: inpatient mental health  Plan for Care reviewed with assigned Medical Provider: yes  Plan for Care Team Review: provider, RN  Patient and/or validated legal guardian concurs: yes  Clinical Substantiation: While pt has experienced some progress toward stabilization, pt continues to experiencing sleep difficulties, racing thoughts, pressured speech, restlessness. Writer consulted with ED psychiatry, who is in agreement with current plan for inpatient mental health admission.  Patient continues to be voluntary.    Legal Status: Legal Status at Admission: Voluntary/Patient has signed consent for treatment    Session Status: Session Number: 5  Anticipated number of sessions or this episode of care: 6  Date of most recent diagnostic assessment: 10/24/23  Session total: 25 minutes  Start: 10:15am  End: 10:40am    CPT Code: CPT Codes: 18097 - Psychotherapy (with  patient) - 30 (16-37*) min    Diagnosis:   Patient Active Problem List   Diagnosis Code    Depression with anxiety F41.8    Primary insomnia F51.01    Gambling problem Z72.6    Benzodiazepine dependence (H) F13.20    Schizoaffective disorder, bipolar type (H) F25.0    ELEN (generalized anxiety disorder) F41.1    Cannabis use, unspecified, uncomplicated F12.90    Psychosis (H) F29    Nightmares F51.5     Schizoaffective disorder, bipolar type (H)-primary F25.0   ELEN (generalized anxiety disorder) F41.1       Cal Díaz [facility]   Licensed Mental Health Professional (LMHP), [facility]  [phone]
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
## 2023-11-15 NOTE — ED NOTES
"Pt currently awake, visible in milieu, interacting with staff. Bright affect, denies SI/SIB/HI. VSS. Endorses left shoulder pain (PRN Tylenol administered). Pt behaviorally controlled at this time. Plan of care ongoing.    1300 - Pt reporting some \"agitation\". Pacing but behaviorally controlled. PRN Zyprexa 5mg administered. No safety concerns noted at this time.    Pt accepted at [facility]. Transportation set for [phone]. Nurse to nurse report pending. Oncoming nurse informed.   "

## 2023-11-15 NOTE — ED NOTES
2:00 am Patient resting with eyes closed and chest rise and fall observed.Patient show overdue medication but it is prn only from last shift. Only given as part of 3 medications dose joined not needed.Patient is schedule for blood draw in am.Lab contacted.

## 2023-11-15 NOTE — ED NOTES
11:30 pm I took over patient's report patient in bed resting breathing normal chest moving up and down.0040 patient in bed resting no distress noted.5:30 am Patient woke up c/o anxiety and agitated requested  Zyprexa 5 mg with some relief afterward patient was given coffee and crackers. 6:25 patient requested nicotine Lozenge 4 mg was given.patient Labs were done pending result.
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
## 2023-11-15 NOTE — TELEPHONE ENCOUNTER
R: [place]  Access Inpatient Bed Call Log  11/15/2023 12:55 AM  Intake has called facilities that have not updated their bed status within the last 12 hours.??      ADULTS:     *[facility] -- [facility]: @ cap per website.  [place] -- [facility]:  @ cap per website.  [place] -- [facility]: @cap per website  [place] -- [facility]: @ cap per website.   [place] -- [facility]: @cap per website.  [facility] -- [facility]: @ cap per website.  [place] -- [facility]/YA beds Posting 2 beds. Ages 18-25, Voluntary only, COVID test req'd, NO aggression, physical or sexual assault, violence hx or drug abuse  [place] -- [facility]: @ cap per website.  [place] -- [facility]: @ cap per website.  [place] -- [facility]:  @ cap per website.     *[facility]: Posting 1 bed. Mixed unit/Low acuity only.   [facility]: @ cap per website. Low acuity, No aggression  [facility]: @ cap per website   [facility]:  @ cap per website. Low acuity only. No current aggression.  [facility]:  Posting 5 bed. COVID negative test req. Lower acuity only  [facility]: Posting 10 beds. Low acuity only. Prefer med adjustments placement.  [facility]: Posting 4 beds. No aggression  [place] -- [facility]: Posting 1 bed. NO reviews after 10PM. Low acuity only.    [place] -- [facility]: Posting 2 beds. No hx of aggression. No sexual offenders. Voluntary patients only.      Pt remains on waitlist pending appropriate placement availability.

## 2023-11-15 NOTE — PROGRESS NOTES
Triage & Transition Services, Extended Care    Client Name: Markus Mosley    Date: November 15, 2023  Service Type:  Group Therapy  Session Start Time:  1130 am    Session End Time: 1200 pm  Session Length: 30 minutes  Site Location: [facility]  Attendees: Patient and other group members  Facilitator: Naomi Davenport     Topic:   Strength activity     Response:  Patient did not participate in group. Patient was with a visitor.        Naomi Davenport

## 2023-11-15 NOTE — ED NOTES
Patient has been watching movies and social with peers this evening. Reports that he is anxious to get put on a inpatient unit. He had multiple prn requests this shift. Will continue to monitor.

## 2023-11-15 NOTE — PROGRESS NOTES
"Triage and Transition Services Extended Care Reassessment     Patient: Markus goes by \"Markus,\" uses he/him pronouns  Date of Service: November 15, 2023  Site of Service: LTAC, located within [facility] EMERGENCY DEPARTMENT                             [id number]      History of Patient's Original Emergency Room Encounter: Since Oct admit and discharge, pt has been staying with his parents in town.  Aunt Mandi identifies that this is a stressor for all of them.  From Oct 2023 ED visit:  Pt is a 37 year old White male with a history of schizophrenia, depression, anxiety, suicidal ideations, ADHD and KIMBERLY.  Pt was brought to the ER today by his mom due to worsening of depression, anxiety and paranoia.  Pt also complained of having increased sensitivity to the sound or noise in surroundings which triggered his anxiety.  Pt had pressured, tangential and disorganized speech.  Pt appeared to be distracted and agitated as he moved around, sitting, standing and moved around the iPAD/cart.  Pt remarked, \"I'm having buch of crazy shit going on, figure something out, fucking weired is happening to me, I can't stop crying, I was so numb, yesterday I got two pieces of paint on me, there is weird sound going on, I feel very very powerful, I feel like I am growing stronger, my feet are growing, I am not fucking kidding you, I feel stupid.\"  Pt shared he was in a bad relationship with his partner and he recently moved back to his parents' house about 2 weeks ago.  Pt endorsed increased depression, cry, flashbacks, isolation, worry, racing thoughts and anxiety.  Pt shared he mostly worried about disappointing his parents.  Pt reported having poor sleep and loss of appetite.  Pt endorsed paranoia as he felt someone was watching him and people were trying to poison him.  Pt denied having auditory hallucination but endorsed visual hallucination of seeing both positive and negative energies.  Pt denied having suicidal and homicidal ideations.  Pt " denied having access to firearms.  Pt endorsed SIB by cutting many years ago but reported recent scratching on his skin until it started to bleed.  Pt reported history of suicide attempt 2x by overdosing pills.  Pt complained he has no testosterone.  Pt identified losing his sister and friend who recently passed away as stressors in his life.    Presentation Summary: Writer met with pt in milieu in the [facility] today 11/15/2023. Pt continues to present with hyper-verbal, pressured speech, and restlessness, alternating between sitting and standing during interaction.  Pt discusses a movie in which he draws a connection on the ways in which he feels triggered due to hx of trauma. Writer and pt discuss how trauma symptoms can be triggered and ways pt can use coping skills in that moment. Pt discusses experiencing feelings of frustration about waiting in the [facility] for IP for nearly 1 week. Writer pt discussed the validity of pt's emotions and discussed various coping strategies. Pt reports feeling less irritable today than he was yesterday. Pt expresses wanting to talk with the ED psych provider about medications, reporting he would like medications scheduled rather than PRN, feels he needs an increase in Zyprexa, and expresses concern about not sleeping well at night. Pt reports he typically can only stay asleep for a few hours at a time before waking up.    Therapeutic Interventions Provided: Coached on coping techniques/relaxation skills to help improve distress tolerance and managing intense emotions.    Current Symptoms: anxious irritable anxious, racing thoughts hyperverbal      Mental Status Exam   Affect: Dramatic  Appearance: Appropriate  Attention Span/Concentration: Attentive  Eye Contact: Variable    Fund of Knowledge: Appropriate   Language /Speech Content: Fluent  Language /Speech Volume: Normal  Language /Speech Rate/Productions: Hyperverbal, Pressured  Recent Memory: Intact  Remote Memory: Intact  Mood:  Anxious  Orientation to Person: Yes   Orientation to Place: Yes  Orientation to Time of Day: Yes  Orientation to Date: Yes     Situation (Do they understand why they are here?): Yes  Psychomotor Behavior: Hyperactive  Thought Content: Clear, Other (please comment) (racing thoughts)  Thought Form: Flight of Ideas    Treatment Objective(s) Addressed: rapport building, identifying and practicing coping strategies, processing feelings, assessing safety    Patient Response to Interventions: eager to participate    Progress Towards Goals:  Patient Reports Symptoms Are: improving  Patient Progress Toward Goals: is making progress  Comment: Pt is making progress towards goals, however, continues to present with symptoms of magali: restlessness, difficulty sleeping, pressured speech.  Next Step to Work Toward Discharge: symptom stabilization  System Stabilization Comment: Continue to monitor symptoms of magali.    Case Management: Case Management Included: collaborating with patient's support system  Details on Collaborating with Patient's Support System: consulting with ED care team.         Plan: Final Disposition / Recommended Care Path: inpatient mental health  Plan for Care reviewed with assigned Medical Provider: yes  Plan for Care Team Review: provider, RN  Patient and/or validated legal guardian concurs: yes  Clinical Substantiation: While pt has experienced some progress toward stabilization, pt continues to experiencing sleep difficulties, racing thoughts, pressured speech, restlessness. Writer consulted with ED psychiatry, who is in agreement with current plan for inpatient mental health admission.  Patient continues to be voluntary.    Legal Status: Legal Status at Admission: Voluntary/Patient has signed consent for treatment    Session Status: Session Number: 5  Anticipated number of sessions or this episode of care: 6  Date of most recent diagnostic assessment: 10/24/23      CPT code(s) utilized: 10642 -  Psychotherapy (with patient) - 30 (16-37*) min    Diagnosis:   Patient Active Problem List   Diagnosis Code    Depression with anxiety F41.8    Primary insomnia F51.01    Gambling problem Z72.6    Benzodiazepine dependence (H) F13.20    Schizoaffective disorder, bipolar type (H) F25.0    ELEN (generalized anxiety disorder) F41.1    Cannabis use, unspecified, uncomplicated F12.90    Psychosis (H) F29    Nightmares F51.5      Schizoaffective disorder, bipolar type (H)-primary F25.0   ELEN (generalized anxiety disorder) F41.1       Cal Díaz, [facility]   Licensed Mental Health Professional (LMHP), [facility]  [phone]

## 2023-11-16 NOTE — ED NOTES
Patient agreeable to transfer plan.  Denies SI and HI. All belongings that were brought into the hospital have been returned to patient. At time of departure pt had no question and no unmet needs. Escorted off the unit at 8:20 PM  accompanied by EMS. Discharged to [facility] via EMS transport.

## 2023-11-16 NOTE — PROGRESS NOTES
"Triage & Transition Services, Extended Care      Patient: Markus Mosley , goes by \"Markus\"  Date: November 15, 2023  Service Type: Group  Site Location: [facility]  Facilitator: Ila Do     Topic:   Pastels    Patient was in sleeping in the extended milieu and did not participate in group.     Ila Do  Extended Care Coordinator  "

## 2023-11-16 NOTE — CARE PLAN
Triage & Transition Services, Extended Care     Client Name: Markus Mosley    Date: November 15, 2023  Service Type: (P) attended group session  Session Start Time:  (P) 1739    Session End Time: (P) 1750  Session Length: (P) 11  Site Location: [facility] EMERGENCY DEPARTMENT                               Total Number ofAttendees: (P) 4  Topic:   (P) relaxation techniques   Response: (P) disorganized, listened actively, unable to interrupt patient, verbalizations were off topic     Ila Do   Licensed Mental Health Professional (LMHP), Extended Care  [phone]

## 2023-11-16 NOTE — TELEPHONE ENCOUNTER
R: Pt discharged from the ED at 8:21PM en route to [facility].  Pt removed from PPS worklist.

## 2024-04-13 ENCOUNTER — HEALTH MAINTENANCE LETTER (OUTPATIENT)
Age: 39
End: 2024-04-13

## 2024-05-06 NOTE — PROGRESS NOTES
"Video visit    Start time 10:12, stop time 10:24; additional 10 minutes spent on the date of the encounter doing chart review, documentation and further activities as noted.     Provider location: [facility], [address] [place] [address] [address], [place] [address]    Patient location: patient home    Mode of transmission: video     Subjective:    Established patient. He saw Endocrinologist, Dr. Rosy Hernandez in the [facility] 4/10/2024 for evaluation of testosterone with the assessment that his testosterone is currently normal and testosterone replacement is not recommended.     Markus Mosley is a 38 year old male who presents for evaluation of testosterone. The following is a comprehensive summary of his Endocrine care to date.    Erectile dysfunction started in his 20s (as of 5/2024 this is no longer a concern). Low libido started around age 29 (this issue persists). These symptoms led to evaluation for low testosterone.     Puberty may have been slightly early compared to his peers. No children.     FSH/LH:  -12/2/2021 (first time ever checked), collected at 9:13 AM: total testosterone 234 ng/dL (ref range 264 - 916), free testosterone 10.2 pg/mL (ref range 8.7 - 25.1)  -1/13/2022, no time given and he recalls it was around a 1 PM collection: total testosterone 216 ng/dL (ref range 264 - 916), free testosterone 5.9 pg/mL (ref range 8.7 - 25.1), FSH 10.4 (ref range 1.5 - 12.4 mIU/mL)  -1/8/2024 at 8:12 AM: total testosterone 346 ng/dL (reference range 240 - 950 ng/dL), LH 3.0 international unit(s)/L (normal range), FSH 7.1 international unit(s)/L (normal range)  -2/2/2024 at 7:48 AM: total testosterone 410 ng/dL (reference range 240 - 950 ng/dL), free testosterone 11.5 ng/dL (ref range 4.65 - 18.1)   -We reviewed the standard UTD list of etiologies of primary and secondary hypogonadism and they are negative unless noted above or below    ACTH:  -Weight stable around 200 - 215 # and he is 5'8\" " "with BMI ~32.7 kg/m2  -No prior cortisol assay     TSH:  -10/2023: TSH 3.74  -Free T4 drawn 5/2022 and normal   -no known thyroid disease    Prolactin:  -1/13/2022, 2/2/2024: prolactin normal     GH:    DI:   -serum sodium has been normal     No prior use of testosterone or other prescription or over the counter medications that raise testosterone.     He does not have children and has no plans for children in the coming years    Marijuana use once per week. No use of glucocorticoids. He is on Suboxone and has been sober for ~2 years. No head injuries outside of a prior remote concussion in childhood.     He used to snore but no longer does after his recent significant weight loss.    MRI brain 3/15/2022: unremarkable including normal sella, I reviewed the images in detail and agree with the radiology read     Chest X-ray 9/2021: unremarkable per OSH radiology read    Testicular US 9/23/2017: normal testicles bilaterally     No history of DM/DM medication use    No FH of hypogonadism.     Maternal grandfather with prostate cancer, no other prostate cancer in the family. Markus has no prostate pathology. No prior VTE. No FH of VTE. No FH of any endocrinopathy..    Comorbidities include: substance abuse history, alcohol use disorder, and chart diagnoses of schizophrenia and schizoaffective disorder.    Objective:    5'8\", 200 # with BMI 30.4 kg/m2    Video visit.    Assessment/Plan:    # Low libido  # Fatigue   # Medically complicated obesity    His testosterone was normal when checked in January and February 2024.  His other hormonal testing is also reassuring.  There is no indication for testosterone therapy.    I suggest focusing on increasing activity levels and weight loss.  We reviewed walking 30 minutes/day, using my fitness pal, and he is interested in seeing a dietitian and I made this referral.    If despite the above changes for several months he has persistent fatigue an then an 8 AM cortisol can be " checked given he is on Suboxone and Roger will let me know.  No follow-up ordered.

## 2024-05-07 ENCOUNTER — VIRTUAL VISIT (OUTPATIENT)
Dept: ENDOCRINOLOGY | Facility: CLINIC | Age: 39
End: 2024-05-07
Payer: COMMERCIAL

## 2024-05-07 ENCOUNTER — MYC MEDICAL ADVICE (OUTPATIENT)
Dept: ENDOCRINOLOGY | Facility: CLINIC | Age: 39
End: 2024-05-07

## 2024-05-07 DIAGNOSIS — R53.83 OTHER FATIGUE: ICD-10-CM

## 2024-05-07 DIAGNOSIS — R68.82 DECREASED LIBIDO: ICD-10-CM

## 2024-05-07 DIAGNOSIS — E66.09 CLASS 1 OBESITY DUE TO EXCESS CALORIES WITH SERIOUS COMORBIDITY AND BODY MASS INDEX (BMI) OF 32.0 TO 32.9 IN ADULT: Primary | ICD-10-CM

## 2024-05-07 DIAGNOSIS — E66.811 CLASS 1 OBESITY DUE TO EXCESS CALORIES WITH SERIOUS COMORBIDITY AND BODY MASS INDEX (BMI) OF 32.0 TO 32.9 IN ADULT: Primary | ICD-10-CM

## 2024-05-07 PROCEDURE — 99214 OFFICE O/P EST MOD 30 MIN: CPT | Mod: 95 | Performed by: INTERNAL MEDICINE

## 2024-05-07 RX ORDER — GABAPENTIN 100 MG/1
CAPSULE ORAL
COMMUNITY

## 2024-05-07 RX ORDER — HYDROXYZINE PAMOATE 50 MG/1
50 CAPSULE ORAL
COMMUNITY
Start: 2022-07-26

## 2024-05-07 NOTE — LETTER
5/7/2024         RE: Markus Mosley  [address]  [facility] [address]        Dear Colleague,    Thank you for referring your patient, Markus Mosley, to the [facility]. Please see a copy of my visit note below.    Video visit    Start time 10:12, stop time 10:24; additional 10 minutes spent on the date of the encounter doing chart review, documentation and further activities as noted.     Provider location: [facility], [address] [place] [address] [address], [place] [address]    Patient location: patient home    Mode of transmission: video     Subjective:    Established patient. He saw Endocrinologist, Dr. Rosy Hernandez in the [facility] 4/10/2024 for evaluation of testosterone with the assessment that his testosterone is currently normal and testosterone replacement is not recommended.     Markus Mosley is a 38 year old male who presents for evaluation of testosterone. The following is a comprehensive summary of his Endocrine care to date.    Erectile dysfunction started in his 20s (as of 5/2024 this is no longer a concern). Low libido started around age 29 (this issue persists). These symptoms led to evaluation for low testosterone.     Puberty may have been slightly early compared to his peers. No children.     FSH/LH:  -12/2/2021 (first time ever checked), collected at 9:13 AM: total testosterone 234 ng/dL (ref range 264 - 916), free testosterone 10.2 pg/mL (ref range 8.7 - 25.1)  -1/13/2022, no time given and he recalls it was around a 1 PM collection: total testosterone 216 ng/dL (ref range 264 - 916), free testosterone 5.9 pg/mL (ref range 8.7 - 25.1), FSH 10.4 (ref range 1.5 - 12.4 mIU/mL)  -1/8/2024 at 8:12 AM: total testosterone 346 ng/dL (reference range 240 - 950 ng/dL), LH 3.0 international unit(s)/L (normal range), FSH 7.1 international unit(s)/L (normal range)  -2/2/2024 at 7:48 AM: total testosterone 410 ng/dL (reference range 240 -  "950 ng/dL), free testosterone 11.5 ng/dL (ref range 4.65 - 18.1)   -We reviewed the standard UTD list of etiologies of primary and secondary hypogonadism and they are negative unless noted above or below    ACTH:  -Weight stable around 200 - 215 # and he is 5'8\" with BMI ~32.7 kg/m2  -No prior cortisol assay     TSH:  -10/2023: TSH 3.74  -Free T4 drawn 5/2022 and normal   -no known thyroid disease    Prolactin:  -1/13/2022, 2/2/2024: prolactin normal     GH:    DI:   -serum sodium has been normal     No prior use of testosterone or other prescription or over the counter medications that raise testosterone.     He does not have children and has no plans for children in the coming years    Marijuana use once per week. No use of glucocorticoids. He is on Suboxone and has been sober for ~2 years. No head injuries outside of a prior remote concussion in childhood.     He used to snore but no longer does after his recent significant weight loss.    MRI brain 3/15/2022: unremarkable including normal sella, I reviewed the images in detail and agree with the radiology read     Chest X-ray 9/2021: unremarkable per OSH radiology read    Testicular US 9/23/2017: normal testicles bilaterally     No history of DM/DM medication use    No FH of hypogonadism.     Maternal grandfather with prostate cancer, no other prostate cancer in the family. Markus has no prostate pathology. No prior VTE. No FH of VTE. No FH of any endocrinopathy..    Comorbidities include: substance abuse history, alcohol use disorder, and chart diagnoses of schizophrenia and schizoaffective disorder.    Objective:    5'8\", 200 # with BMI 30.4 kg/m2    Video visit.    Assessment/Plan:    # Low libido  # Fatigue   # Medically complicated obesity    His testosterone was normal when checked in January and February 2024.  His other hormonal testing is also reassuring.  There is no indication for testosterone therapy.    I suggest focusing on increasing activity " levels and weight loss.  We reviewed walking 30 minutes/day, using my fitness pal, and he is interested in seeing a dietitian and I made this referral.    If despite the above changes for several months he has persistent fatigue an then an 8 AM cortisol can be checked given he is on Suboxone and Roger will let me know.  No follow-up ordered.      Again, thank you for allowing me to participate in the care of your patient.        Sincerely,        Ke Sheets MD

## 2024-07-11 NOTE — ED NOTES
----- Message from Charo Wiley sent at 7/8/2024  7:35 AM CDT -----  Labs are stable , creatinine slight elevated will repeat CMP in one week    Assumed care of pt at this time